# Patient Record
Sex: MALE | Race: OTHER | HISPANIC OR LATINO | URBAN - METROPOLITAN AREA
[De-identification: names, ages, dates, MRNs, and addresses within clinical notes are randomized per-mention and may not be internally consistent; named-entity substitution may affect disease eponyms.]

---

## 2017-06-06 ENCOUNTER — INPATIENT (INPATIENT)
Facility: HOSPITAL | Age: 52
LOS: 29 days | Discharge: HOME | DRG: 885 | End: 2017-07-06
Attending: PSYCHIATRY & NEUROLOGY | Admitting: PSYCHIATRY & NEUROLOGY
Payer: MEDICAID

## 2017-06-06 VITALS
OXYGEN SATURATION: 100 % | RESPIRATION RATE: 16 BRPM | TEMPERATURE: 98 F | SYSTOLIC BLOOD PRESSURE: 149 MMHG | HEART RATE: 88 BPM | DIASTOLIC BLOOD PRESSURE: 94 MMHG

## 2017-06-06 DIAGNOSIS — F25.9 SCHIZOAFFECTIVE DISORDER, UNSPECIFIED: ICD-10-CM

## 2017-06-06 DIAGNOSIS — F12.20 CANNABIS DEPENDENCE, UNCOMPLICATED: ICD-10-CM

## 2017-06-06 LAB
ALBUMIN SERPL ELPH-MCNC: 3.8 G/DL — SIGNIFICANT CHANGE UP (ref 3.3–5)
ALP SERPL-CCNC: 96 U/L — SIGNIFICANT CHANGE UP (ref 40–120)
ALT FLD-CCNC: 8 U/L — LOW (ref 10–45)
ANION GAP SERPL CALC-SCNC: 12 MMOL/L — SIGNIFICANT CHANGE UP (ref 5–17)
APAP SERPL-MCNC: <15 UG/ML — SIGNIFICANT CHANGE UP (ref 10–30)
APPEARANCE UR: CLEAR — SIGNIFICANT CHANGE UP
AST SERPL-CCNC: 13 U/L — SIGNIFICANT CHANGE UP (ref 10–40)
BASOPHILS NFR BLD AUTO: 0.3 % — SIGNIFICANT CHANGE UP (ref 0–2)
BILIRUB SERPL-MCNC: 0.3 MG/DL — SIGNIFICANT CHANGE UP (ref 0.2–1.2)
BILIRUB UR-MCNC: NEGATIVE — SIGNIFICANT CHANGE UP
BUN SERPL-MCNC: 9 MG/DL — SIGNIFICANT CHANGE UP (ref 7–23)
CALCIUM SERPL-MCNC: 9.1 MG/DL — SIGNIFICANT CHANGE UP (ref 8.4–10.5)
CHLORIDE SERPL-SCNC: 102 MMOL/L — SIGNIFICANT CHANGE UP (ref 96–108)
CO2 SERPL-SCNC: 23 MMOL/L — SIGNIFICANT CHANGE UP (ref 22–31)
COLOR SPEC: YELLOW — SIGNIFICANT CHANGE UP
CREAT SERPL-MCNC: 0.8 MG/DL — SIGNIFICANT CHANGE UP (ref 0.5–1.3)
DIFF PNL FLD: (no result)
EOSINOPHIL NFR BLD AUTO: 2.4 % — SIGNIFICANT CHANGE UP (ref 0–6)
ETHANOL SERPL-MCNC: <10 MG/DL — SIGNIFICANT CHANGE UP (ref 0–10)
GLUCOSE SERPL-MCNC: 170 MG/DL — HIGH (ref 70–99)
GLUCOSE UR QL: NEGATIVE — SIGNIFICANT CHANGE UP
HCT VFR BLD CALC: 36.6 % — LOW (ref 39–50)
HGB BLD-MCNC: 12.1 G/DL — LOW (ref 13–17)
KETONES UR-MCNC: NEGATIVE — SIGNIFICANT CHANGE UP
LEUKOCYTE ESTERASE UR-ACNC: NEGATIVE — SIGNIFICANT CHANGE UP
LYMPHOCYTES # BLD AUTO: 25.1 % — SIGNIFICANT CHANGE UP (ref 13–44)
MCHC RBC-ENTMCNC: 28.1 PG — SIGNIFICANT CHANGE UP (ref 27–34)
MCHC RBC-ENTMCNC: 33.1 G/DL — SIGNIFICANT CHANGE UP (ref 32–36)
MCV RBC AUTO: 84.9 FL — SIGNIFICANT CHANGE UP (ref 80–100)
MONOCYTES NFR BLD AUTO: 8.7 % — SIGNIFICANT CHANGE UP (ref 2–14)
NEUTROPHILS NFR BLD AUTO: 63.5 % — SIGNIFICANT CHANGE UP (ref 43–77)
NITRITE UR-MCNC: NEGATIVE — SIGNIFICANT CHANGE UP
PCP SPEC-MCNC: SIGNIFICANT CHANGE UP
PH UR: 7 — SIGNIFICANT CHANGE UP (ref 5–8)
PLATELET # BLD AUTO: 318 K/UL — SIGNIFICANT CHANGE UP (ref 150–400)
POTASSIUM SERPL-MCNC: 4 MMOL/L — SIGNIFICANT CHANGE UP (ref 3.5–5.3)
POTASSIUM SERPL-SCNC: 4 MMOL/L — SIGNIFICANT CHANGE UP (ref 3.5–5.3)
PROT SERPL-MCNC: 7 G/DL — SIGNIFICANT CHANGE UP (ref 6–8.3)
PROT UR-MCNC: NEGATIVE MG/DL — SIGNIFICANT CHANGE UP
RBC # BLD: 4.31 M/UL — SIGNIFICANT CHANGE UP (ref 4.2–5.8)
RBC # FLD: 14.4 % — SIGNIFICANT CHANGE UP (ref 10.3–16.9)
SALICYLATES SERPL-MCNC: <0.3 MG/DL — LOW (ref 2.8–20)
SODIUM SERPL-SCNC: 137 MMOL/L — SIGNIFICANT CHANGE UP (ref 135–145)
SP GR SPEC: 1.01 — SIGNIFICANT CHANGE UP (ref 1–1.03)
TSH SERPL-MCNC: 1.65 UIU/ML — SIGNIFICANT CHANGE UP (ref 0.35–4.94)
UROBILINOGEN FLD QL: 0.2 E.U./DL — SIGNIFICANT CHANGE UP
WBC # BLD: 7 K/UL — SIGNIFICANT CHANGE UP (ref 3.8–10.5)
WBC # FLD AUTO: 7 K/UL — SIGNIFICANT CHANGE UP (ref 3.8–10.5)

## 2017-06-06 PROCEDURE — 90792 PSYCH DIAG EVAL W/MED SRVCS: CPT

## 2017-06-06 PROCEDURE — 93010 ELECTROCARDIOGRAM REPORT: CPT

## 2017-06-06 PROCEDURE — 99285 EMERGENCY DEPT VISIT HI MDM: CPT | Mod: 25

## 2017-06-06 RX ORDER — NICOTINE POLACRILEX 2 MG
1 GUM BUCCAL DAILY
Qty: 0 | Refills: 0 | Status: DISCONTINUED | OUTPATIENT
Start: 2017-06-06 | End: 2017-06-07

## 2017-06-06 RX ORDER — GABAPENTIN 400 MG/1
300 CAPSULE ORAL THREE TIMES A DAY
Qty: 0 | Refills: 0 | Status: DISCONTINUED | OUTPATIENT
Start: 2017-06-06 | End: 2017-07-06

## 2017-06-06 RX ORDER — DULOXETINE HYDROCHLORIDE 30 MG/1
30 CAPSULE, DELAYED RELEASE ORAL DAILY
Qty: 0 | Refills: 0 | Status: DISCONTINUED | OUTPATIENT
Start: 2017-06-06 | End: 2017-06-14

## 2017-06-06 RX ORDER — DIVALPROEX SODIUM 500 MG/1
750 TABLET, DELAYED RELEASE ORAL
Qty: 0 | Refills: 0 | Status: DISCONTINUED | OUTPATIENT
Start: 2017-06-06 | End: 2017-07-06

## 2017-06-06 RX ORDER — BUPROPION HYDROCHLORIDE 150 MG/1
0 TABLET, EXTENDED RELEASE ORAL
Qty: 0 | Refills: 0 | COMMUNITY

## 2017-06-06 RX ORDER — OLANZAPINE 15 MG/1
20 TABLET, FILM COATED ORAL AT BEDTIME
Qty: 0 | Refills: 0 | Status: DISCONTINUED | OUTPATIENT
Start: 2017-06-06 | End: 2017-06-14

## 2017-06-06 NOTE — ED BEHAVIORAL HEALTH ASSESSMENT NOTE - SUICIDE RISK FACTORS
Mood episode/Hopelessness/Command hallucinations to hurt self/Substance abuse/dependence/Access to means (pills, firearms, etc.)

## 2017-06-06 NOTE — ED BEHAVIORAL HEALTH ASSESSMENT NOTE - OTHER PAST PSYCHIATRIC HISTORY (INCLUDE DETAILS REGARDING ONSET, COURSE OF ILLNESS, INPATIENT/OUTPATIENT TREATMENT)
Prior diagnosis of schizoaffective versus bipolar vs major depression. Several hospitalizations per patient since 2001, last was [facility] a month ago (verified with ), has been hospitalized prior at [facility]. No outpatient providers. Prior history of suicide attempt via overdose four years ago on zyprexa. No violence.

## 2017-06-06 NOTE — ED PROVIDER NOTE - MEDICAL DECISION MAKING DETAILS
51M with hx of bipolar d/o, depression presenting with suicidal ideation. No medical complaints, vital signs wnl, neuro exam nonfocal. Alert and oriented x 2 (knows president but not exact date). oriented to person, place, pt is medically cleared. Will admit to psych.

## 2017-06-06 NOTE — ED PROVIDER NOTE - OBJECTIVE STATEMENT
51M with bipolar d/o, schizophrenia presenting with suicidal ideation, told triage nurse he wants to jump in front of a bus. No other plans.

## 2017-06-06 NOTE — ED ADULT NURSE NOTE - OBJECTIVE STATEMENT
presented to ED "Feeling suicidal since yesterday; Anniversary of my Mother's death. Plan to run in front of a train".

## 2017-06-06 NOTE — ED BEHAVIORAL HEALTH ASSESSMENT NOTE - PSYCHIATRIC ISSUES AND PLAN (INCLUDE STANDING AND PRN MEDICATION)
Restart zyprexa 20mg po qhs, gabapentin 300mg po TID, depakote 750mg po BID, and cymbalta 30mg po daily

## 2017-06-06 NOTE — ED BEHAVIORAL HEALTH ASSESSMENT NOTE - SUMMARY
51 year old man domiciled in shelter, history of schizoaffective disorder, multiple hospitalizations last one month prior at [facility], one prior suicide attempt via overdose, here with command auditory hallucinations to kill himself by jumping in front of a bus in context of ongoing cannabis use and non-compliance with medication. Continues to feel suicidal with plan and possible intent.

## 2017-06-06 NOTE — ED BEHAVIORAL HEALTH ASSESSMENT NOTE - HPI (INCLUDE ILLNESS QUALITY, SEVERITY, DURATION, TIMING, CONTEXT, MODIFYING FACTORS, ASSOCIATED SIGNS AND SYMPTOMS)
51 year old single male on SSI domiciled at shelter on [facility] with prior psychiatric history of schizoaffective disorder, multiple hospitalizations last at [facility] one month prior for similar presentation, one suicide attempt four years prior via overdose, no history of violence, not in outpatient treatment, daily cannabis use, no dependents, no legal history or history of abuse, who brought himself in complaining of worsening depression and command auditory hallucinations to jump in front of bus in context of non-compliance with medication.    Patient with poverty of thought, somewhat concrete. Reports has been feeling suicidal a few weeks. Came in today because was feeling really depressed. Reports it is anniversary of his mother's death. Still feels suicidal to jump in front of bus or train. Voices of women telling him to leave hospital and go kill himself. Feeling depressed of late, low energy, poor appetite, poor sleep, hopelessness, helplessness and worthlessness. Does not want to actually die so brought himself to hospital.     Called [facility] for information about last admission. Per covering physician, last admitted March 25-May 5 for suicidal ideation of two weeks to jump in front of train because of being homeless in context of non-compliance with medication. He has been admitted there four times. Has been in outpatient treatment there in past from [id number]. Was not able to access discharge summary due to inpatient psychiatric record being on paper. Medications confirmed.     No other sources of collateral were available.

## 2017-06-06 NOTE — ED ADULT NURSE REASSESSMENT NOTE - NS ED NURSE REASSESS COMMENT FT1
pt alert/oriented and calm/cooperative. Pt states "I'm suicidal!"  He remains on constat observation

## 2017-06-06 NOTE — ED PROVIDER NOTE - CONSTITUTIONAL, MLM
normal... Well appearing, well nourished, awake, alert, oriented x 3 (person, place, president) in no apparent distress.

## 2017-06-06 NOTE — ED BEHAVIORAL HEALTH ASSESSMENT NOTE - DETAILS
history of suicide attempt four years ago by overdose on zyprexa GI to leave and kill himself 8u nursing self

## 2017-06-06 NOTE — ED BEHAVIORAL HEALTH ASSESSMENT NOTE - DIFFERENTIAL
Schizoaffective disorder  Bipolar disorder  Major depressive disorder  Cannabis-induced mood disorder

## 2017-06-06 NOTE — ED ADULT TRIAGE NOTE - ARRIVAL INFO ADDITIONAL COMMENTS
Last attempt 2 years ago. Reports hearing voices. Denies any CP, SOB, use of alcohol or drugs, wanting to hurt others.

## 2017-06-07 DIAGNOSIS — Z00.00 ENCOUNTER FOR GENERAL ADULT MEDICAL EXAMINATION WITHOUT ABNORMAL FINDINGS: ICD-10-CM

## 2017-06-07 DIAGNOSIS — R41.89 OTHER SYMPTOMS AND SIGNS INVOLVING COGNITIVE FUNCTIONS AND AWARENESS: ICD-10-CM

## 2017-06-07 DIAGNOSIS — F17.200 NICOTINE DEPENDENCE, UNSPECIFIED, UNCOMPLICATED: ICD-10-CM

## 2017-06-07 PROCEDURE — 99223 1ST HOSP IP/OBS HIGH 75: CPT

## 2017-06-07 RX ORDER — NICOTINE POLACRILEX 2 MG
1 GUM BUCCAL DAILY
Qty: 0 | Refills: 0 | Status: DISCONTINUED | OUTPATIENT
Start: 2017-06-07 | End: 2017-06-07

## 2017-06-07 RX ORDER — DIPHENHYDRAMINE HCL 50 MG
50 CAPSULE ORAL AT BEDTIME
Qty: 0 | Refills: 0 | Status: DISCONTINUED | OUTPATIENT
Start: 2017-06-07 | End: 2017-07-06

## 2017-06-07 RX ORDER — NICOTINE POLACRILEX 2 MG
1 GUM BUCCAL DAILY
Qty: 0 | Refills: 0 | Status: DISCONTINUED | OUTPATIENT
Start: 2017-06-08 | End: 2017-07-06

## 2017-06-07 RX ADMIN — DULOXETINE HYDROCHLORIDE 30 MILLIGRAM(S): 30 CAPSULE, DELAYED RELEASE ORAL at 10:47

## 2017-06-07 RX ADMIN — OLANZAPINE 20 MILLIGRAM(S): 15 TABLET, FILM COATED ORAL at 22:46

## 2017-06-07 RX ADMIN — GABAPENTIN 300 MILLIGRAM(S): 400 CAPSULE ORAL at 10:47

## 2017-06-07 RX ADMIN — DIVALPROEX SODIUM 750 MILLIGRAM(S): 500 TABLET, DELAYED RELEASE ORAL at 18:40

## 2017-06-07 RX ADMIN — GABAPENTIN 300 MILLIGRAM(S): 400 CAPSULE ORAL at 15:42

## 2017-06-07 RX ADMIN — Medication 50 MILLIGRAM(S): at 22:46

## 2017-06-07 RX ADMIN — Medication 1 PATCH: at 10:47

## 2017-06-07 RX ADMIN — DIVALPROEX SODIUM 750 MILLIGRAM(S): 500 TABLET, DELAYED RELEASE ORAL at 10:47

## 2017-06-07 RX ADMIN — GABAPENTIN 300 MILLIGRAM(S): 400 CAPSULE ORAL at 22:46

## 2017-06-07 NOTE — BEHAVIORAL HEALTH ASSESSMENT NOTE - PROBLEM SELECTOR PLAN 3
Will offer Nicotine patch 14mg/24 hrs for equivalent 10cigarettes/day.   patient on benefits of smoking cessation.

## 2017-06-07 NOTE — BEHAVIORAL HEALTH ASSESSMENT NOTE - RISK ASSESSMENT
Patient represents a clear risk to self and requires inpatient level of care.  	Risk factors: history of multiple psychiatric hospitalizations, male, substance      abuse, unemployed, domicile in shelter, prior suicide attempt   	Protective factors: seeking treatment, prior outpatient therapy

## 2017-06-07 NOTE — BEHAVIORAL HEALTH ASSESSMENT NOTE - NSBHSUICRISKFACTOR_PSY_A_CORE
Chronic pain or acute medical issue/Global insomnia/Hopelessness/Mood episode/Agitation/severe anxiety/Access to means (pills, firearms, etc.)/Command hallucinations to hurt self/Impulsivity

## 2017-06-07 NOTE — BEHAVIORAL HEALTH ASSESSMENT NOTE - OTHER PAST PSYCHIATRIC HISTORY (INCLUDE DETAILS REGARDING ONSET, COURSE OF ILLNESS, INPATIENT/OUTPATIENT TREATMENT)
Schizoaffective d/o   Prior hospitalizations: multiple prior psychiatric hospitalizations at [facility] secondary to SI and depression.   History of prior suicide attempts: patient reports 1 prior suicide attempt by drug overdose of Zyprexa approximately two years ago.   History of self-injurious behaviors: denies  Past medication trials: Zyprexa, Cymbalta, Depakote

## 2017-06-07 NOTE — BEHAVIORAL HEALTH ASSESSMENT NOTE - HPI (INCLUDE ILLNESS QUALITY, SEVERITY, DURATION, TIMING, CONTEXT, MODIFYING FACTORS, ASSOCIATED SIGNS AND SYMPTOMS)
52 y/o single, unemployed,  male domiciled at [facility] with a PHH of multiple psychiatric hospitalizations and schizoaffective disorder BIB self for depressed mood and SI stating “I don’t feel like living.” Endorses that he has been depressed for the past couple of years however is unable to verbalize what prompted him to come into the ED yesterday. Notes there was no inciting factor that triggered the depression. Patient reports one suicide attempt a couple years prior by overdosing on zyprexa. States he wants to execute suicide again. Admits he currently is experiencing auditory hallucinations in which,  “multiple big tall females of unrecognizable voices tell me to go jump in front of a train.” Reports, “they are here in the room right now” however unclear if there are associated visual hallucinations.  Endorses that he was hospitalized at [facility] from 3/25/17-5/5/17 secondary to depression and was discharged with Depakote, Zyprexa, and Cymbalta however unclear of dosage. Notes he has not been receiving medical care recently and has not taken his medications for the past couple of weeks. States he was regularly seeing a therapist outpatient at [facility] approximately 1 year prior and endorses that was helpful and believes it would be beneficial to restart. Denies HI.

## 2017-06-07 NOTE — BEHAVIORAL HEALTH ASSESSMENT NOTE - CASE SUMMARY
50 y/o single, unemployed,  male domiciled at [facility] with a PHH of multiple psychiatric hospitalizations and schizoaffective disorder BIB self for depressed mood and SI stating “I don’t feel like living.” Endorses that he has been depressed for the past couple of years however is unable to verbalize what prompted him to come into the ED yesterday. Notes there was no inciting factor that triggered the depression. Patient reports one suicide attempt a couple years prior by overdosing on zyprexa. States he wants to execute suicide again. Admits he currently is experiencing auditory hallucinations in which,  “multiple big tall females of unrecognizable voices tell me to go jump in front of a train.” Reports, “they are here in the room right now” however unclear if there are associated visual hallucinations.  Endorses that he was hospitalized at [facility] from 3/25/17-5/5/17 secondary to depression and was discharged with Depakote, Zyprexa, and Cymbalta however unclear of dosage. Notes he has not been receiving medical care recently and has not taken his medications for the past couple of weeks. States he was regularly seeing a therapist outpatient at [facility] approximately 1 year prior and endorses that was helpful and believes it would be beneficial to restart. Denies HI.  Patient found to be oriented but poor concentration; internally preoccupied denying AH or HI or SI.

## 2017-06-07 NOTE — BEHAVIORAL HEALTH ASSESSMENT NOTE - PROBLEM SELECTOR PLAN 1
a.	Continue Zyprexa 20mg qhs  b.	Continue Depakote 750mg BID. Will obtain a VPA level in AM.  c.	Continue Cymbalta 30mg qd for depression with adjunctive neuropathic pain control.  d.	I/G/M therapy.

## 2017-06-07 NOTE — BEHAVIORAL HEALTH ASSESSMENT NOTE - MODIFICATIONS
monitor cognitive functioning; consider alternative to Zyprexa in view of possible/likely non-complaince

## 2017-06-07 NOTE — BEHAVIORAL HEALTH ASSESSMENT NOTE - PROBLEM SELECTOR PLAN 4
EDUCATION: Continue to educate patient on risks/benefits of each psychotropic medication and psychotherapeutic modality  PROPHYLAXIS: Will check Folate, Vitamin B12, Vitamin D, Lipid Panel, HbA1c, assess cognition daily.  DISPOSITION: Discharge planning in progress.

## 2017-06-07 NOTE — BEHAVIORAL HEALTH ASSESSMENT NOTE - DETAILS
Lithium - nausea/vomiting Nerve pain in bilateral lower extremities Female "clones" telling him to jump in front of a train

## 2017-06-08 PROCEDURE — 99232 SBSQ HOSP IP/OBS MODERATE 35: CPT

## 2017-06-08 RX ADMIN — Medication 50 MILLIGRAM(S): at 21:35

## 2017-06-08 RX ADMIN — GABAPENTIN 300 MILLIGRAM(S): 400 CAPSULE ORAL at 08:01

## 2017-06-08 RX ADMIN — DIVALPROEX SODIUM 750 MILLIGRAM(S): 500 TABLET, DELAYED RELEASE ORAL at 21:35

## 2017-06-08 RX ADMIN — Medication 1 PATCH: at 14:15

## 2017-06-08 RX ADMIN — OLANZAPINE 20 MILLIGRAM(S): 15 TABLET, FILM COATED ORAL at 21:35

## 2017-06-08 RX ADMIN — DULOXETINE HYDROCHLORIDE 30 MILLIGRAM(S): 30 CAPSULE, DELAYED RELEASE ORAL at 14:15

## 2017-06-08 RX ADMIN — DIVALPROEX SODIUM 750 MILLIGRAM(S): 500 TABLET, DELAYED RELEASE ORAL at 08:01

## 2017-06-08 RX ADMIN — GABAPENTIN 300 MILLIGRAM(S): 400 CAPSULE ORAL at 21:35

## 2017-06-08 NOTE — PROGRESS NOTE BEHAVIORAL HEALTH - CASE SUMMARY
52 y/o single, unemployed,  male domiciled at [facility] with a PPH of multiple psychiatric hospitalizations and schizoaffective disorder BIB self for depressed mood and SI stating “I don’t feel like living.” Patient with command auditory hallucinations and questionable visual hallucinations. He appears depressed and endorses suicidal ideation with a plan.  Patient continues psychotic and somewhat oppositional .

## 2017-06-08 NOTE — PROGRESS NOTE BEHAVIORAL HEALTH - NSBHFUPINTERVALHXFT_PSY_A_CORE
Per nursing note, patient was observed out watching TV during pm. Patient was compliant with his meds and received Benadryl 50mg po PRN at 10:46pm. Patient observed asleep during the night.      Patient was initially approached lying comfortably on bed in hospital attire at approx. 8am however was unarousable. Attempted to awake the patient 2 hours thereafter and patient was cooperative, calm however lethargic with poor eye contact. Endorses he does not feel any better since yesterday and that his AH are persistent insisting, “leave and go jump in front of a train.” Reports although feeling hopeless, he desires to get better. Admits he has not attended any therapy sessions thus far and has no intention to do so. Plans to spend the day watching TV much like yesterday. Notes he slept well through the night and has no change in appetite/eating habits. Patient denies N/V/D, Constipation or any other physical symptoms. Patient continues to deny HI, VH and does not exhibit grossly delusional or paranoid behavior.  Mini Mental Status Examination performed. Patient scores a 28/30.

## 2017-06-09 LAB
24R-OH-CALCIDIOL SERPL-MCNC: 14.6 NG/ML — LOW (ref 30–100)
CHOLEST SERPL-MCNC: 208 MG/DL — HIGH (ref 10–199)
FOLATE SERPL-MCNC: 14.7 NG/ML — SIGNIFICANT CHANGE UP (ref 4.8–24.2)
HBA1C BLD-MCNC: 5.3 % — SIGNIFICANT CHANGE UP (ref 4–5.6)
HDLC SERPL-MCNC: 45 MG/DL — SIGNIFICANT CHANGE UP (ref 40–125)
LIPID PNL WITH DIRECT LDL SERPL: 148 MG/DL — HIGH
TOTAL CHOLESTEROL/HDL RATIO MEASUREMENT: 4.6 RATIO — SIGNIFICANT CHANGE UP (ref 3.4–9.6)
TRIGL SERPL-MCNC: 75 MG/DL — SIGNIFICANT CHANGE UP (ref 10–149)
VALPROATE SERPL-MCNC: 67 UG/ML — SIGNIFICANT CHANGE UP (ref 50–100)
VIT B12 SERPL-MCNC: 266 PG/ML — SIGNIFICANT CHANGE UP (ref 243–894)

## 2017-06-09 PROCEDURE — 99232 SBSQ HOSP IP/OBS MODERATE 35: CPT

## 2017-06-09 RX ADMIN — GABAPENTIN 300 MILLIGRAM(S): 400 CAPSULE ORAL at 13:35

## 2017-06-09 RX ADMIN — DIVALPROEX SODIUM 750 MILLIGRAM(S): 500 TABLET, DELAYED RELEASE ORAL at 06:42

## 2017-06-09 RX ADMIN — GABAPENTIN 300 MILLIGRAM(S): 400 CAPSULE ORAL at 22:56

## 2017-06-09 RX ADMIN — Medication 1 PATCH: at 13:35

## 2017-06-09 RX ADMIN — GABAPENTIN 300 MILLIGRAM(S): 400 CAPSULE ORAL at 06:42

## 2017-06-09 RX ADMIN — DULOXETINE HYDROCHLORIDE 30 MILLIGRAM(S): 30 CAPSULE, DELAYED RELEASE ORAL at 13:35

## 2017-06-09 RX ADMIN — OLANZAPINE 20 MILLIGRAM(S): 15 TABLET, FILM COATED ORAL at 22:56

## 2017-06-09 RX ADMIN — Medication 1 PATCH: at 14:50

## 2017-06-09 RX ADMIN — DIVALPROEX SODIUM 750 MILLIGRAM(S): 500 TABLET, DELAYED RELEASE ORAL at 17:57

## 2017-06-09 NOTE — PROGRESS NOTE BEHAVIORAL HEALTH - CASE SUMMARY
50 y/o single, unemployed,  male domiciled at [facility] with a PPH of multiple psychiatric hospitalizations and schizoaffective disorder BIB self for depressed mood and SI stating “I don’t feel like living.” Patient with command auditory hallucinations and questionable visual hallucinations. He appears depressed and endorses suicidal ideation with a plan however does not intend to execute during inpatient stay. Probable schizoaffective d/o vs. MDD in conjunction with AH and questionable VH.   To the writer appears psychotic with blocking inappropriate affect; very poor ADLs; avoiding interview; smiles strangely at the writer.  Continue efforts to treat psychotic sxs.

## 2017-06-09 NOTE — PROGRESS NOTE BEHAVIORAL HEALTH - MODIFICATIONS
agree with plan to switch to agent with practical ZAMORA e.g. Invega given significant psychotic features and likelihood of poor compliance.

## 2017-06-09 NOTE — PROGRESS NOTE BEHAVIORAL HEALTH - PROBLEM SELECTOR PLAN 1
a.	Continue Zyprexa 20mg qhs - will consider switch to Paliperidone secondary to sedative effects of Olanzapine. Will obtain EKG secondary to high dose Olanzapine and concurrent nicotine use  b.	Continue Depakote 750mg BID. VPA = 67 on 6/9/17  c.	Continue Cymbalta 30mg qd for depression with adjunctive neuropathic pain control.  d.	I/G/M therapy.

## 2017-06-09 NOTE — PROGRESS NOTE BEHAVIORAL HEALTH - NSBHFUPINTERVALHXFT_PSY_A_CORE
Per nursing note, patient is observed sleeping during the night without episode of agitation or acute distress whilst maintained on q15 minute observation and q2 hour purposeful proactive rounding. In the evening he was visible on the units out for snacks and medication. Compliant with meds. Mood was depressed with concrete thought process and blunted affect. Reports auditory hallucinations and ideations to jump in front of a train but states he would not attempt to hurt himself while in hospital. At 21:35 he reported insomnia and received Benadryl 50mg PRN with good affect. Patient with poor insight. In behavioral control. Continuing to monitor.     Patient was seen individually this morning lying comfortably on bed in hospital attire and remained selectively mute, guarded, lethargic, with poor eye contact. Endorses he feels “good” despite continuous auditory hallucinations from the “clones” which are now “cursing at me because they don’t like me.” Endorses that the “clones” are continuously urging him to “jump in front of a train” however reports, “I’m fighting it.” Suicidal ideations are persistent however verbalizes he does not intend to execute plan while inpatient. Admits he spent the day playing cards and watching TV and anticipates continuing with these activities today. Notes he slept well through the night despite need for Benadryl and has no change in appetite/eating habits. Patient denies N/V/D, constipation or any other physical symptoms. Compliant with meds and denies any adverse reactions. Patient continues to deny HI, VH and does not exhibit grossly delusional or paranoid behavior.

## 2017-06-10 RX ADMIN — DULOXETINE HYDROCHLORIDE 30 MILLIGRAM(S): 30 CAPSULE, DELAYED RELEASE ORAL at 11:05

## 2017-06-10 RX ADMIN — GABAPENTIN 300 MILLIGRAM(S): 400 CAPSULE ORAL at 11:05

## 2017-06-10 RX ADMIN — DIVALPROEX SODIUM 750 MILLIGRAM(S): 500 TABLET, DELAYED RELEASE ORAL at 11:05

## 2017-06-10 RX ADMIN — GABAPENTIN 300 MILLIGRAM(S): 400 CAPSULE ORAL at 14:05

## 2017-06-10 RX ADMIN — Medication 1 PATCH: at 13:58

## 2017-06-10 RX ADMIN — DIVALPROEX SODIUM 750 MILLIGRAM(S): 500 TABLET, DELAYED RELEASE ORAL at 18:16

## 2017-06-10 RX ADMIN — Medication 1 PATCH: at 14:04

## 2017-06-11 RX ADMIN — GABAPENTIN 300 MILLIGRAM(S): 400 CAPSULE ORAL at 22:04

## 2017-06-11 RX ADMIN — Medication 1 PATCH: at 10:45

## 2017-06-11 RX ADMIN — GABAPENTIN 300 MILLIGRAM(S): 400 CAPSULE ORAL at 14:09

## 2017-06-11 RX ADMIN — OLANZAPINE 20 MILLIGRAM(S): 15 TABLET, FILM COATED ORAL at 22:05

## 2017-06-11 RX ADMIN — DIVALPROEX SODIUM 750 MILLIGRAM(S): 500 TABLET, DELAYED RELEASE ORAL at 06:59

## 2017-06-11 RX ADMIN — Medication 50 MILLIGRAM(S): at 22:05

## 2017-06-11 RX ADMIN — DIVALPROEX SODIUM 750 MILLIGRAM(S): 500 TABLET, DELAYED RELEASE ORAL at 17:53

## 2017-06-11 RX ADMIN — DULOXETINE HYDROCHLORIDE 30 MILLIGRAM(S): 30 CAPSULE, DELAYED RELEASE ORAL at 10:44

## 2017-06-11 RX ADMIN — GABAPENTIN 300 MILLIGRAM(S): 400 CAPSULE ORAL at 06:59

## 2017-06-12 PROCEDURE — 99232 SBSQ HOSP IP/OBS MODERATE 35: CPT

## 2017-06-12 RX ORDER — CHOLECALCIFEROL (VITAMIN D3) 125 MCG
1000 CAPSULE ORAL DAILY
Qty: 0 | Refills: 0 | Status: DISCONTINUED | OUTPATIENT
Start: 2017-06-12 | End: 2017-07-06

## 2017-06-12 RX ADMIN — DIVALPROEX SODIUM 750 MILLIGRAM(S): 500 TABLET, DELAYED RELEASE ORAL at 11:51

## 2017-06-12 RX ADMIN — GABAPENTIN 300 MILLIGRAM(S): 400 CAPSULE ORAL at 11:52

## 2017-06-12 RX ADMIN — Medication 1 PATCH: at 14:00

## 2017-06-12 RX ADMIN — GABAPENTIN 300 MILLIGRAM(S): 400 CAPSULE ORAL at 14:00

## 2017-06-12 RX ADMIN — DIVALPROEX SODIUM 750 MILLIGRAM(S): 500 TABLET, DELAYED RELEASE ORAL at 17:41

## 2017-06-12 RX ADMIN — Medication 1000 UNIT(S): at 11:52

## 2017-06-12 RX ADMIN — DULOXETINE HYDROCHLORIDE 30 MILLIGRAM(S): 30 CAPSULE, DELAYED RELEASE ORAL at 11:53

## 2017-06-12 RX ADMIN — Medication 1 PATCH: at 11:38

## 2017-06-12 NOTE — PROGRESS NOTE BEHAVIORAL HEALTH - NSBHFUPINTERVALHXFT_PSY_A_CORE
Per nursing note, patient visible on unit, social with select peers. Compliant with meds. Complained of difficulty sleeping and was given Benadryl 50mg PRN at 2205. Slept most of the night. Blunted affect. Continue to monitor and observe.     Patient was seen individually this morning lying comfortably on bed in hospital attire and remained selectively mute, guarded, lethargic, with poor eye contact. Endorses he is feeling “a little better” and notes he spent the majority of the weekend watching television. Admits he did not attend any group therapy sessions the last couple of days however does intend to today. Reports that the “clones” are continuously urging him to “jump in front of a train and tell me to die.” Endorses his suicidal ideations are persistent however verbalizes he does not intend to execute plan while inpatient. Patient denies N/V/D, constipation or any other physical symptoms. Per patient, no changes in sleeping or eating habits. Compliant with meds and denies any adverse reactions. Patient continues to deny HI, VH and does not exhibit grossly delusional or paranoid behavior.    CURRENT MEDICATIONS:  MEDICATIONS  (STANDING):  OLANZapine 20milliGRAM(s) Oral at bedtime  diVALproex DR 750milliGRAM(s) Oral two times a day  DULoxetine 30milliGRAM(s) Oral daily  gabapentin 300milliGRAM(s) Oral three times a day  nicotine -  14 mG/24Hr(s) Patch 1patch Transdermal daily  cholecalciferol 1000Unit(s) Oral daily    MEDICATIONS  (PRN):  diphenhydrAMINE   Capsule 50milliGRAM(s) Oral at bedtime PRN Insomnia

## 2017-06-12 NOTE — PROGRESS NOTE BEHAVIORAL HEALTH - PROBLEM SELECTOR PLAN 1
a.	Continue Zyprexa 20mg qhs - will consider switch to Paliperidone secondary to sedative effects of Olanzapine.   b.           EKG pending  b.	Continue Depakote 750mg BID. VPA = 67 on 6/9/17  c.	Continue Cymbalta 30mg qd for depression with adjunctive neuropathic pain control.  d.	I/G/M therapy.

## 2017-06-12 NOTE — PROGRESS NOTE BEHAVIORAL HEALTH - CASE SUMMARY
52 y/o single, unemployed,  male domiciled at [facility] with a PPH of multiple psychiatric hospitalizations and schizoaffective disorder BIB self for depressed mood and SI stating “I don’t feel like living.” Patient with command auditory hallucinations and questionable visual hallucinations. He appears depressed and endorses suicidal ideation with a plan however does not intend to execute during inpatient stay. Probable schizoaffective d/o vs. MDD in conjunction with AH and questionable VH. Patient is difficult to engage and his affect is blunted. Needs encouragement to take medications. Remains isolative and guarded requiring inpatient hospitalization at this time for the safety of the patient,  Patient has had limited interaction with the writer smiling quietly and saying little; also limited interaction with staff mostly quietly taking meals and saying little;  need to see more social behavior but concerns about aftercare compliance described above remain.  ADLs are poor.  Appears blocked and internally preoccupied.  Monitor EKG as Zyrexa dose increased.

## 2017-06-12 NOTE — PROGRESS NOTE BEHAVIORAL HEALTH - MODIFICATIONS
while the patient appears to be making slow progress concern about aftercare compliance on oral Zyprexa.  Need to consider agent with practical ZAMORA or case management .

## 2017-06-13 PROCEDURE — 99232 SBSQ HOSP IP/OBS MODERATE 35: CPT

## 2017-06-13 RX ORDER — PALIPERIDONE 1.5 MG/1
3 TABLET, EXTENDED RELEASE ORAL DAILY
Qty: 0 | Refills: 0 | Status: DISCONTINUED | OUTPATIENT
Start: 2017-06-13 | End: 2017-06-14

## 2017-06-13 RX ORDER — DOCUSATE SODIUM 100 MG
100 CAPSULE ORAL
Qty: 0 | Refills: 0 | Status: DISCONTINUED | OUTPATIENT
Start: 2017-06-13 | End: 2017-07-06

## 2017-06-13 RX ADMIN — GABAPENTIN 300 MILLIGRAM(S): 400 CAPSULE ORAL at 14:06

## 2017-06-13 RX ADMIN — GABAPENTIN 300 MILLIGRAM(S): 400 CAPSULE ORAL at 21:54

## 2017-06-13 RX ADMIN — Medication 100 MILLIGRAM(S): at 21:54

## 2017-06-13 RX ADMIN — Medication 1 PATCH: at 14:07

## 2017-06-13 RX ADMIN — Medication 50 MILLIGRAM(S): at 21:53

## 2017-06-13 RX ADMIN — DULOXETINE HYDROCHLORIDE 30 MILLIGRAM(S): 30 CAPSULE, DELAYED RELEASE ORAL at 10:42

## 2017-06-13 RX ADMIN — DIVALPROEX SODIUM 750 MILLIGRAM(S): 500 TABLET, DELAYED RELEASE ORAL at 10:43

## 2017-06-13 RX ADMIN — GABAPENTIN 300 MILLIGRAM(S): 400 CAPSULE ORAL at 10:42

## 2017-06-13 RX ADMIN — Medication 1000 UNIT(S): at 10:42

## 2017-06-13 RX ADMIN — DIVALPROEX SODIUM 750 MILLIGRAM(S): 500 TABLET, DELAYED RELEASE ORAL at 16:45

## 2017-06-13 RX ADMIN — OLANZAPINE 20 MILLIGRAM(S): 15 TABLET, FILM COATED ORAL at 21:54

## 2017-06-13 NOTE — PROGRESS NOTE BEHAVIORAL HEALTH - NSBHCHARTREVIEWLAB_PSY_A_CORE FT
Vitamin D25=14.6  Vitamin B12 = 266  Folate = 14.7
Vitamin D25=14.6  Vitamin B12 = 266  Folate = 14.7
VPA = 67  HbA1c = 5.3  Lipid Panel:   LDL = 148; HDL = 45; TG = 75; Total Chol: 208

## 2017-06-13 NOTE — PROGRESS NOTE BEHAVIORAL HEALTH - NSBHFUPINTERVALHXFT_PSY_A_CORE
continues rather avoidant; case reviewed with Dr. Malloy of [facility]; suggestion made regarding switch to agent with more practical ZAMORA in view of past h/o poor compliance;  discussed with the patient cross over to Invega/SUSTENNA from Zyprexa;  carmela helpful in view of sedation and constipation.  Patient agrees.  Other suggestions involved questioning use of Cymbalta low dose and possible switch to Lithium from Depakote.  To be considered.   No behavioral issues.

## 2017-06-13 NOTE — PROGRESS NOTE BEHAVIORAL HEALTH - NSBHADMITMEDEDUDETAILS_A_CORE FT
Plan to introduce Invega and monitor; if possible trim Zyprexa.  Will consider need to switch mood stabilizer;  likely Cymbalta can be d/c'd and replacement found for pain issues.

## 2017-06-13 NOTE — PROGRESS NOTE BEHAVIORAL HEALTH - DETAILS
Nerve pain in bilateral lower extremities details not shared with this writer. constipation in am; but had bowel movement after lunch.

## 2017-06-14 RX ORDER — PALIPERIDONE 1.5 MG/1
6 TABLET, EXTENDED RELEASE ORAL DAILY
Qty: 0 | Refills: 0 | Status: DISCONTINUED | OUTPATIENT
Start: 2017-06-15 | End: 2017-06-19

## 2017-06-14 RX ORDER — OLANZAPINE 15 MG/1
15 TABLET, FILM COATED ORAL AT BEDTIME
Qty: 0 | Refills: 0 | Status: DISCONTINUED | OUTPATIENT
Start: 2017-06-14 | End: 2017-06-19

## 2017-06-14 RX ORDER — PALIPERIDONE 1.5 MG/1
6 TABLET, EXTENDED RELEASE ORAL DAILY
Qty: 0 | Refills: 0 | Status: DISCONTINUED | OUTPATIENT
Start: 2017-06-14 | End: 2017-06-14

## 2017-06-14 RX ADMIN — Medication 100 MILLIGRAM(S): at 10:17

## 2017-06-14 RX ADMIN — Medication 100 MILLIGRAM(S): at 22:51

## 2017-06-14 RX ADMIN — PALIPERIDONE 3 MILLIGRAM(S): 1.5 TABLET, EXTENDED RELEASE ORAL at 10:16

## 2017-06-14 RX ADMIN — GABAPENTIN 300 MILLIGRAM(S): 400 CAPSULE ORAL at 22:52

## 2017-06-14 RX ADMIN — OLANZAPINE 15 MILLIGRAM(S): 15 TABLET, FILM COATED ORAL at 22:51

## 2017-06-14 RX ADMIN — DIVALPROEX SODIUM 750 MILLIGRAM(S): 500 TABLET, DELAYED RELEASE ORAL at 10:16

## 2017-06-14 RX ADMIN — Medication 1 PATCH: at 13:25

## 2017-06-14 RX ADMIN — GABAPENTIN 300 MILLIGRAM(S): 400 CAPSULE ORAL at 10:16

## 2017-06-14 RX ADMIN — GABAPENTIN 300 MILLIGRAM(S): 400 CAPSULE ORAL at 13:10

## 2017-06-14 RX ADMIN — Medication 1 PATCH: at 13:11

## 2017-06-14 RX ADMIN — DULOXETINE HYDROCHLORIDE 30 MILLIGRAM(S): 30 CAPSULE, DELAYED RELEASE ORAL at 13:11

## 2017-06-14 RX ADMIN — Medication 1000 UNIT(S): at 10:16

## 2017-06-14 NOTE — PROGRESS NOTE BEHAVIORAL HEALTH - NSBHFUPINTERVALHXFT_PSY_A_CORE
Per nursing note, patient is observed sleeping through the night without episode of acute distress nor agitation and continues on q15 minute observation and q2 purposeful rounding. In the evening he was visible on the unit, attired in hospital gowns, of neat appearance; isolating from other community members. At HS medication pass he was compliant. Patient reported insomnia and received Benadryl 50mg po PRN at 21:53. His mood was brighter as evidenced by smiling; affect was more reactive. He denies having suicidal ideations whilst in the hospital; states that he still has ideations to jump in front of a train if discharged. Thoughts are concrete with limited insight. He is maintaining good behavioral control. Continuing to monitor and implement plan of care.     Patient was seen individually this morning lying comfortably on bed, blanket over face, reporting “go away.” Was uncooperative, had poor eye contact, and had to be prompted repeatedly in order to elicit a response. Endorses he is feeling “depressed” because “he doesn’t feel well.” Notes, “I don’t know what is going to make me feel better.” However when asked to clarify, he responds “nothing is bothering me.” States he attended one group therapy on movement yesterday that he noted to be “alright” and has intent to participate in another session today. Reports that the “clones” are still urging him to “leave and jump in front of a train.” Endorses his suicidal ideations are persistent however insists he does not intend to execute plan while inpatient. Per patient, constipation resolved. Admits one soft, formed bowel movement yesterday s/p Colace. Patient denies N/V/D, bloating, or abdominal pain. Notes he has been taking Benadryl secondary to difficulty falling asleep with moderate relief. No changes in appetite. Per patient, compliant with meds and denies any adverse reactions. Patient continues to deny HI, VH and does not exhibit grossly delusional or paranoid behavior.    CURRENT MEDS:  MEDICATIONS  (STANDING):  OLANZapine 20milliGRAM(s) Oral at bedtime  diVALproex DR 750milliGRAM(s) Oral two times a day  DULoxetine 30milliGRAM(s) Oral daily  gabapentin 300milliGRAM(s) Oral three times a day  nicotine -  14 mG/24Hr(s) Patch 1patch Transdermal daily  cholecalciferol 1000Unit(s) Oral daily  paliperidone ER 3milliGRAM(s) Oral daily  docusate sodium 100milliGRAM(s) Oral two times a day    MEDICATIONS  (PRN):  diphenhydrAMINE   Capsule 50milliGRAM(s) Oral at bedtime PRN Insomnia

## 2017-06-14 NOTE — PROGRESS NOTE BEHAVIORAL HEALTH - OTHER
Patient often declines to answer; head under the sheets Improved as compared to yesterday Improving Rigidity in lower limbs - chronic

## 2017-06-14 NOTE — PROGRESS NOTE BEHAVIORAL HEALTH - PROBLEM SELECTOR PLAN 1
a.	Decrease Zyprexa to 15 mg qhs - increase Paliperidone to 3mg PO BID.   b.           EKG pending  b.	Continue Depakote 750mg BID. VPA = 67 on 6/9/17  c.	Discontinue Cymbalta   d.	I/G/M therapy.

## 2017-06-14 NOTE — PROGRESS NOTE BEHAVIORAL HEALTH - NSBHADMITMEDEDUDETAILS_A_CORE FT
Plan to introduce Invega and monitor; if possible trim Zyprexa.  Will consider need to switch mood stabilizer;   Cymbalta can be d/c'd and replacement found for pain issues.

## 2017-06-15 RX ADMIN — Medication 100 MILLIGRAM(S): at 09:45

## 2017-06-15 RX ADMIN — Medication 1 PATCH: at 09:43

## 2017-06-15 RX ADMIN — GABAPENTIN 300 MILLIGRAM(S): 400 CAPSULE ORAL at 14:14

## 2017-06-15 RX ADMIN — Medication 1000 UNIT(S): at 09:45

## 2017-06-15 RX ADMIN — PALIPERIDONE 6 MILLIGRAM(S): 1.5 TABLET, EXTENDED RELEASE ORAL at 09:45

## 2017-06-15 RX ADMIN — GABAPENTIN 300 MILLIGRAM(S): 400 CAPSULE ORAL at 09:45

## 2017-06-15 RX ADMIN — OLANZAPINE 15 MILLIGRAM(S): 15 TABLET, FILM COATED ORAL at 21:42

## 2017-06-15 RX ADMIN — Medication 1 PATCH: at 09:46

## 2017-06-15 RX ADMIN — Medication 100 MILLIGRAM(S): at 21:41

## 2017-06-15 RX ADMIN — GABAPENTIN 300 MILLIGRAM(S): 400 CAPSULE ORAL at 21:44

## 2017-06-15 RX ADMIN — DIVALPROEX SODIUM 750 MILLIGRAM(S): 500 TABLET, DELAYED RELEASE ORAL at 21:41

## 2017-06-15 RX ADMIN — DIVALPROEX SODIUM 750 MILLIGRAM(S): 500 TABLET, DELAYED RELEASE ORAL at 09:44

## 2017-06-15 RX ADMIN — Medication 50 MILLIGRAM(S): at 21:42

## 2017-06-15 NOTE — PROGRESS NOTE BEHAVIORAL HEALTH - PROBLEM SELECTOR PLAN 1
a.	Continue Zyprexa 15mg qhs and Paliperdone 6mg PO daily.   b.           EKG pending  c.	Continue Depakote 750mg BID. VPA = 67 on 6/9/17  d.	I/G/M therapy.

## 2017-06-15 NOTE — PROGRESS NOTE BEHAVIORAL HEALTH - NSBHFUPINTERVALHXFT_PSY_A_CORE
Per nursing note, patient was observed mostly in bed during pm. Out for his meds. Altered with no complaints. Patient was compliant with his meds and observed asleep during the night. Will monitor for changes. Proactive, purposeful rounding continued.      Patient was seen individually this morning lying comfortably on bed, in hospital attire, calm, pleasant, with fair eye contact. Endorses he is “feeling a little better” and endorses that the “clones were quiet today and yesterday.” States he attended several group therapy sessions yesterday, which he enjoyed and intends to participate in mores session today. Endorses he is still suicidal however feels safe on the unit and does not intend to execute plan while inpatient. Patient denies N/V/D, or constipation. Had one soft, formed bowel movement yesterday. No changes in sleep hygiene or appetite. Per patient, compliant with meds and denies any adverse reactions. Patient continues to deny HI, VH and does not exhibit grossly delusional or paranoid behavior.

## 2017-06-15 NOTE — PROGRESS NOTE BEHAVIORAL HEALTH - CASE SUMMARY
at time of admission:  50 y/o single, unemployed,  male domiciled at [facility] with a PPH of multiple psychiatric hospitalizations and schizoaffective disorder BIB self for depressed mood and SI stating “I don’t feel like living.” Patient with command auditory hallucinations and questionable visual hallucinations. He appears depressed and endorses suicidal ideation with a plan however does not intend to execute during inpatient stay. Probable schizoaffective d/o vs. MDD in conjunction with AH and questionable VH. Experiencing less auditory hallucinations from baseline today. Pleasant, calm, cooperative, with fair eye contact on encounter. Requires inpatient hospitalization at this time for the safety of the patient, stabilization of symptoms and for safe discharge planning.  Patient responding to switch to Invega from Zyprexa with better mood; less impoverishment of thinking; slightly less constriction; states that "drones" are disappearing;  SI diminishing;  observed attending some groups with limited participation.

## 2017-06-16 DIAGNOSIS — M79.2 NEURALGIA AND NEURITIS, UNSPECIFIED: ICD-10-CM

## 2017-06-16 PROCEDURE — 99231 SBSQ HOSP IP/OBS SF/LOW 25: CPT

## 2017-06-16 RX ORDER — ONDANSETRON 8 MG/1
4 TABLET, FILM COATED ORAL EVERY 6 HOURS
Qty: 0 | Refills: 0 | Status: DISCONTINUED | OUTPATIENT
Start: 2017-06-16 | End: 2017-07-06

## 2017-06-16 RX ORDER — ACETAMINOPHEN 500 MG
650 TABLET ORAL EVERY 6 HOURS
Qty: 0 | Refills: 0 | Status: DISCONTINUED | OUTPATIENT
Start: 2017-06-16 | End: 2017-06-28

## 2017-06-16 RX ADMIN — DIVALPROEX SODIUM 750 MILLIGRAM(S): 500 TABLET, DELAYED RELEASE ORAL at 06:53

## 2017-06-16 RX ADMIN — Medication 1 PATCH: at 09:47

## 2017-06-16 RX ADMIN — GABAPENTIN 300 MILLIGRAM(S): 400 CAPSULE ORAL at 14:05

## 2017-06-16 RX ADMIN — Medication 100 MILLIGRAM(S): at 09:45

## 2017-06-16 RX ADMIN — GABAPENTIN 300 MILLIGRAM(S): 400 CAPSULE ORAL at 21:14

## 2017-06-16 RX ADMIN — Medication 1000 UNIT(S): at 09:44

## 2017-06-16 RX ADMIN — Medication 50 MILLIGRAM(S): at 21:16

## 2017-06-16 RX ADMIN — DIVALPROEX SODIUM 750 MILLIGRAM(S): 500 TABLET, DELAYED RELEASE ORAL at 17:22

## 2017-06-16 RX ADMIN — OLANZAPINE 15 MILLIGRAM(S): 15 TABLET, FILM COATED ORAL at 21:14

## 2017-06-16 RX ADMIN — PALIPERIDONE 6 MILLIGRAM(S): 1.5 TABLET, EXTENDED RELEASE ORAL at 09:45

## 2017-06-16 RX ADMIN — GABAPENTIN 300 MILLIGRAM(S): 400 CAPSULE ORAL at 06:52

## 2017-06-16 RX ADMIN — Medication 1 PATCH: at 12:31

## 2017-06-16 RX ADMIN — Medication 100 MILLIGRAM(S): at 21:14

## 2017-06-16 NOTE — PROGRESS NOTE BEHAVIORAL HEALTH - NSBHFUPINTERVALHXFT_PSY_A_CORE
Per nursing note, patient was visible on the unit at short intervals. Minimally social with peers. Patient accepted scheduled medications. He also requested and received PRN Benadryl 50mg for sleep with good effect. Patient slept throughout the night without further complaints. Purposeful, proactive counseling in progress.      Patient was seen individually this morning lying comfortably on bed, in hospital attire, calm, pleasant, with fair eye contact. Endorses he is “half happy/half depressed.” When asked why he did not attend group therapy yesterday, patient states, “I don’t like what they talk about” however unable to elaborate. Endorses he will attempt to attend sessions today. Currently c/o 9/10, sharp, unrelenting, non-radiating 9/10 lower back pain onset yesterday as well as mild nausea which he attributes to his medications. Admits one episode of NBNB emesis s/p lunch yesterday. Patient denies diarrhea or constipation. Had one soft, formed bowel movement yesterday. Endorses he is still suicidal however feels safe on the unit and does not intend to execute plan while inpatient. Admits the “clones are back today saying stupid shit like I’m an asshole and to jump in front of a train.” No changes in sleep hygiene or appetite. Per patient, compliant with meds. Patient continues to deny HI, VH and does not exhibit grossly delusional or paranoid behavior.    CURRENT MEDICATIONS:  MEDICATIONS  (STANDING):  diVALproex DR 750milliGRAM(s) Oral two times a day  gabapentin 300milliGRAM(s) Oral three times a day  nicotine -  14 mG/24Hr(s) Patch 1patch Transdermal daily  cholecalciferol 1000Unit(s) Oral daily  docusate sodium 100milliGRAM(s) Oral two times a day  OLANZapine 15milliGRAM(s) Oral at bedtime  paliperidone ER 6milliGRAM(s) Oral daily    MEDICATIONS  (PRN):  diphenhydrAMINE   Capsule 50milliGRAM(s) Oral at bedtime PRN Insomnia  ondansetron    Tablet 4milliGRAM(s) Oral every 6 hours PRN Nausea and/or Vomiting  acetaminophen   Tablet. 650milliGRAM(s) Oral every 6 hours PRN Moderate Pain (4 - 6)

## 2017-06-16 NOTE — PROGRESS NOTE BEHAVIORAL HEALTH - PROBLEM SELECTOR PLAN 1
a.	Continue Zyprexa 15mg qhs and Paliperdone ER 6mg PO daily.   b.	Continue Depakote 750mg BID. VPA = 67 on 6/9/17  c.	I/G/M therapy.

## 2017-06-16 NOTE — PROGRESS NOTE BEHAVIORAL HEALTH - CASE SUMMARY
at time of admission:  52 y/o single, unemployed,  male domiciled at [facility] with a PPH of multiple psychiatric hospitalizations and schizoaffective disorder BIB self for depressed mood and SI stating “I don’t feel like living.” Patient with command auditory hallucinations and questionable visual hallucinations. He appears depressed and endorses suicidal ideation with a plan however does not intend to execute during inpatient stay. Probable schizoaffective d/o vs. MDD in conjunction with AH and questionable VH. Experiencing less auditory hallucinations from baseline today. Pleasant, calm, cooperative, with fair eye contact on encounter. Requires inpatient hospitalization at this time for the safety of the patient, stabilization of symptoms and for safe discharge planning.  Patient responding to switch to Invega from Zyprexa with better mood; less impoverishment of thinking; slightly less constriction; states that "drones" are disappearing;  SI diminishing; today avoiding eye contact stating he was "okay" denied sleep appetite issues minor discomfort issues addressed with Gabapentin.

## 2017-06-16 NOTE — PROGRESS NOTE BEHAVIORAL HEALTH - DETAILS
Nerve pain in bilateral lower extremities, lower back pain they're telling me that I'm an asshole because I'm killing them with a gun.

## 2017-06-17 RX ADMIN — OLANZAPINE 15 MILLIGRAM(S): 15 TABLET, FILM COATED ORAL at 21:05

## 2017-06-17 RX ADMIN — GABAPENTIN 300 MILLIGRAM(S): 400 CAPSULE ORAL at 18:47

## 2017-06-17 RX ADMIN — PALIPERIDONE 6 MILLIGRAM(S): 1.5 TABLET, EXTENDED RELEASE ORAL at 10:43

## 2017-06-17 RX ADMIN — DIVALPROEX SODIUM 750 MILLIGRAM(S): 500 TABLET, DELAYED RELEASE ORAL at 07:08

## 2017-06-17 RX ADMIN — DIVALPROEX SODIUM 750 MILLIGRAM(S): 500 TABLET, DELAYED RELEASE ORAL at 18:47

## 2017-06-17 RX ADMIN — Medication 50 MILLIGRAM(S): at 21:05

## 2017-06-17 RX ADMIN — Medication 1 PATCH: at 18:44

## 2017-06-17 RX ADMIN — GABAPENTIN 300 MILLIGRAM(S): 400 CAPSULE ORAL at 21:05

## 2017-06-17 RX ADMIN — Medication 1000 UNIT(S): at 10:43

## 2017-06-17 RX ADMIN — Medication 100 MILLIGRAM(S): at 10:43

## 2017-06-17 RX ADMIN — GABAPENTIN 300 MILLIGRAM(S): 400 CAPSULE ORAL at 07:07

## 2017-06-17 RX ADMIN — Medication 100 MILLIGRAM(S): at 21:05

## 2017-06-18 RX ADMIN — DIVALPROEX SODIUM 750 MILLIGRAM(S): 500 TABLET, DELAYED RELEASE ORAL at 09:44

## 2017-06-18 RX ADMIN — Medication 1 PATCH: at 14:09

## 2017-06-18 RX ADMIN — GABAPENTIN 300 MILLIGRAM(S): 400 CAPSULE ORAL at 21:20

## 2017-06-18 RX ADMIN — GABAPENTIN 300 MILLIGRAM(S): 400 CAPSULE ORAL at 14:10

## 2017-06-18 RX ADMIN — Medication 1000 UNIT(S): at 09:43

## 2017-06-18 RX ADMIN — OLANZAPINE 15 MILLIGRAM(S): 15 TABLET, FILM COATED ORAL at 21:20

## 2017-06-18 RX ADMIN — Medication 100 MILLIGRAM(S): at 21:20

## 2017-06-18 RX ADMIN — Medication 1 PATCH: at 14:11

## 2017-06-18 RX ADMIN — PALIPERIDONE 6 MILLIGRAM(S): 1.5 TABLET, EXTENDED RELEASE ORAL at 09:43

## 2017-06-18 RX ADMIN — DIVALPROEX SODIUM 750 MILLIGRAM(S): 500 TABLET, DELAYED RELEASE ORAL at 21:21

## 2017-06-18 RX ADMIN — Medication 100 MILLIGRAM(S): at 09:43

## 2017-06-18 RX ADMIN — GABAPENTIN 300 MILLIGRAM(S): 400 CAPSULE ORAL at 09:43

## 2017-06-19 PROCEDURE — 99232 SBSQ HOSP IP/OBS MODERATE 35: CPT

## 2017-06-19 RX ORDER — PALIPERIDONE 1.5 MG/1
9 TABLET, EXTENDED RELEASE ORAL DAILY
Qty: 0 | Refills: 0 | Status: DISCONTINUED | OUTPATIENT
Start: 2017-06-20 | End: 2017-06-30

## 2017-06-19 RX ORDER — OLANZAPINE 15 MG/1
10 TABLET, FILM COATED ORAL AT BEDTIME
Qty: 0 | Refills: 0 | Status: DISCONTINUED | OUTPATIENT
Start: 2017-06-19 | End: 2017-06-22

## 2017-06-19 RX ORDER — PALIPERIDONE 1.5 MG/1
3 TABLET, EXTENDED RELEASE ORAL ONCE
Qty: 0 | Refills: 0 | Status: COMPLETED | OUTPATIENT
Start: 2017-06-19 | End: 2017-06-19

## 2017-06-19 RX ADMIN — Medication 100 MILLIGRAM(S): at 21:37

## 2017-06-19 RX ADMIN — PALIPERIDONE 3 MILLIGRAM(S): 1.5 TABLET, EXTENDED RELEASE ORAL at 13:31

## 2017-06-19 RX ADMIN — GABAPENTIN 300 MILLIGRAM(S): 400 CAPSULE ORAL at 07:36

## 2017-06-19 RX ADMIN — GABAPENTIN 300 MILLIGRAM(S): 400 CAPSULE ORAL at 21:37

## 2017-06-19 RX ADMIN — Medication 100 MILLIGRAM(S): at 10:40

## 2017-06-19 RX ADMIN — PALIPERIDONE 6 MILLIGRAM(S): 1.5 TABLET, EXTENDED RELEASE ORAL at 10:39

## 2017-06-19 RX ADMIN — DIVALPROEX SODIUM 750 MILLIGRAM(S): 500 TABLET, DELAYED RELEASE ORAL at 07:36

## 2017-06-19 RX ADMIN — OLANZAPINE 10 MILLIGRAM(S): 15 TABLET, FILM COATED ORAL at 21:37

## 2017-06-19 RX ADMIN — Medication 50 MILLIGRAM(S): at 21:39

## 2017-06-19 RX ADMIN — GABAPENTIN 300 MILLIGRAM(S): 400 CAPSULE ORAL at 13:34

## 2017-06-19 RX ADMIN — Medication 1000 UNIT(S): at 10:40

## 2017-06-19 RX ADMIN — Medication 1 PATCH: at 15:01

## 2017-06-19 RX ADMIN — Medication 1 PATCH: at 13:34

## 2017-06-19 NOTE — PROGRESS NOTE BEHAVIORAL HEALTH - PROBLEM SELECTOR PLAN 1
a.	Taper Olanzapine to 10mg qhs and titrate Paliperdone ER 9mg PO daily.   b.	Continue Depakote 750mg BID. VPA = 67 on 6/9/17  c.	I/G/M therapy.

## 2017-06-19 NOTE — PROGRESS NOTE BEHAVIORAL HEALTH - NSBHFUPINTERVALHXFT_PSY_A_CORE
Per nursing note, patient sleeping through the night without episode of distress nor agitation while maintained on q15 minute observations and purposeful proactive rounding q2 hours. He is compliant with his medications; mood is neutral with blunted affect. Irritable at times and guarded on approach. No behavioral agitation nor self-injurious behavior with behavioral control. Continuing to monitor.     Patient was seen individually this morning on unit, dressed in hospital attire, guarded on approach, poor eye contact with blunted affect. Endorses he is feeling “alright” without much change in mood. Admits the “clones” are now informing him to “leave” the premises. Notes attendance of some group therapy sessions over the weekend. No changes in sleep hygiene or appetite. Per patient, compliant with meds and denies any adverse reactions. Continues to endorse SI however feels safe on the unit and does not intend to execute plan inpatient. Denies N/V/D, constipation or any other physical complaints. Patient continues to deny HI, VH and does not exhibit grossly delusional or paranoid behavior.    CURRENT MEDICATIONS:  MEDICATIONS  (STANDING):  diVALproex DR 750milliGRAM(s) Oral two times a day  gabapentin 300milliGRAM(s) Oral three times a day  nicotine -  14 mG/24Hr(s) Patch 1patch Transdermal daily  cholecalciferol 1000Unit(s) Oral daily  docusate sodium 100milliGRAM(s) Oral two times a day  OLANZapine 15milliGRAM(s) Oral at bedtime  paliperidone ER 6milliGRAM(s) Oral daily    MEDICATIONS  (PRN):  diphenhydrAMINE   Capsule 50milliGRAM(s) Oral at bedtime PRN Insomnia  ondansetron    Tablet 4milliGRAM(s) Oral every 6 hours PRN Nausea and/or Vomiting  acetaminophen   Tablet. 650milliGRAM(s) Oral every 6 hours PRN Moderate Pain (4 - 6)

## 2017-06-19 NOTE — PROGRESS NOTE BEHAVIORAL HEALTH - DETAILS
Nerve pain in bilateral lower extremities, lower back pain they're telling me to leave and go jump in front of a train.

## 2017-06-19 NOTE — PROGRESS NOTE BEHAVIORAL HEALTH - CASE SUMMARY
52 y/o single, unemployed,  male domiciled at [facility] with a PPH of multiple psychiatric hospitalizations and schizoaffective disorder BIB self for depressed mood and SI stating “I don’t feel like living.” Patient with command auditory hallucinations and questionable visual hallucinations. He appears depressed and endorses suicidal ideation with a plan however does not intend to execute during inpatient stay.  ;;  While lying in bed does make limited eye contact.   endorses SI and AH with inappropriate affect; constricted and sad;  adls poor.

## 2017-06-20 PROCEDURE — 99232 SBSQ HOSP IP/OBS MODERATE 35: CPT

## 2017-06-20 RX ADMIN — DIVALPROEX SODIUM 750 MILLIGRAM(S): 500 TABLET, DELAYED RELEASE ORAL at 08:03

## 2017-06-20 RX ADMIN — Medication 100 MILLIGRAM(S): at 10:37

## 2017-06-20 RX ADMIN — PALIPERIDONE 9 MILLIGRAM(S): 1.5 TABLET, EXTENDED RELEASE ORAL at 10:37

## 2017-06-20 RX ADMIN — GABAPENTIN 300 MILLIGRAM(S): 400 CAPSULE ORAL at 14:09

## 2017-06-20 RX ADMIN — Medication 1 PATCH: at 14:09

## 2017-06-20 RX ADMIN — GABAPENTIN 300 MILLIGRAM(S): 400 CAPSULE ORAL at 08:03

## 2017-06-20 RX ADMIN — Medication 100 MILLIGRAM(S): at 22:27

## 2017-06-20 RX ADMIN — OLANZAPINE 10 MILLIGRAM(S): 15 TABLET, FILM COATED ORAL at 22:28

## 2017-06-20 RX ADMIN — Medication 1 PATCH: at 14:10

## 2017-06-20 RX ADMIN — GABAPENTIN 300 MILLIGRAM(S): 400 CAPSULE ORAL at 22:28

## 2017-06-20 RX ADMIN — Medication 1000 UNIT(S): at 10:37

## 2017-06-20 NOTE — PROGRESS NOTE BEHAVIORAL HEALTH - NSBHFUPINTERVALHXFT_PSY_A_CORE
Per nursing note, patient sleeping through the night without episode of distress nor agitation while maintained on q15 minute observations and purposeful proactive rounding q2 hours. He is compliant with his medications; continues rather guarded and avoidant but a little more accessible; continues blocked mostly spending time in his room.      Patient was seen individually this morning on unit, dressed in hospital attire, guarded on approach, poor eye contact with blunted affect. Endorses he is feeling “alright” without much change in mood. Admits the “clones” are now informing him to “leave” the premises. Notes attendance of some group therapy sessions over the weekend. No changes in sleep hygiene or appetite. Per patient, compliant with meds and denies any adverse reactions. Continues to endorse SI however feels safe on the unit and does not intend to execute plan inpatient. Denies N/V/D, constipation or any other physical complaints. Patient continues to deny HI, VH and does not exhibit grossly delusional or paranoid behavior.    CURRENT MEDICATIONS:  MEDICATIONS  (STANDING):  diVALproex DR 750milliGRAM(s) Oral two times a day  gabapentin 300milliGRAM(s) Oral three times a day  nicotine -  14 mG/24Hr(s) Patch 1patch Transdermal daily  cholecalciferol 1000Unit(s) Oral daily  docusate sodium 100milliGRAM(s) Oral two times a day  OLANZapine 10milliGRAM(s) Oral at bedtime  paliperidone ER 9milliGRAM(s) Oral daily    MEDICATIONS  (PRN):  diphenhydrAMINE   Capsule 50milliGRAM(s) Oral at bedtime PRN Insomnia  ondansetron    Tablet 4milliGRAM(s) Oral every 6 hours PRN Nausea and/or Vomiting  acetaminophen   Tablet. 650milliGRAM(s) Oral every 6 hours PRN Moderate Pain (4 - 6)

## 2017-06-20 NOTE — PROGRESS NOTE BEHAVIORAL HEALTH - PROBLEM SELECTOR PLAN 1
a.	Tapered  Olanzapine to 10mg qhs and titrate Paliperdone ER 9mg PO daily.   b.	Continue Depakote 750mg BID. VPA = 67 on 6/9/17  c.	I/G/M therapy.

## 2017-06-20 NOTE — PROGRESS NOTE BEHAVIORAL HEALTH - DETAILS
Nerve pain in bilateral lower extremities, lower back pain they're telling me to leave and go jump in front of a train.  However less prominent lately

## 2017-06-20 NOTE — PROGRESS NOTE BEHAVIORAL HEALTH - RISK ASSESSMENT
Patient represents a clear risk to self and requires inpatient level of care.  	Risk factors: history of multiple psychiatric hospitalizations, male, substance      abuse, unemployed, domicile in shelter, prior suicide attempt   	Protective factors: seeking treatment, prior outpatient therapy  Modifiable factors: psychotic sxs improving; ADLs and better sleep (some improvement)

## 2017-06-21 PROCEDURE — 99231 SBSQ HOSP IP/OBS SF/LOW 25: CPT

## 2017-06-21 RX ADMIN — Medication 1000 UNIT(S): at 10:11

## 2017-06-21 RX ADMIN — DIVALPROEX SODIUM 750 MILLIGRAM(S): 500 TABLET, DELAYED RELEASE ORAL at 07:41

## 2017-06-21 RX ADMIN — Medication 100 MILLIGRAM(S): at 21:35

## 2017-06-21 RX ADMIN — GABAPENTIN 300 MILLIGRAM(S): 400 CAPSULE ORAL at 07:41

## 2017-06-21 RX ADMIN — Medication 50 MILLIGRAM(S): at 21:35

## 2017-06-21 RX ADMIN — Medication 1 PATCH: at 14:13

## 2017-06-21 RX ADMIN — OLANZAPINE 10 MILLIGRAM(S): 15 TABLET, FILM COATED ORAL at 21:35

## 2017-06-21 RX ADMIN — DIVALPROEX SODIUM 750 MILLIGRAM(S): 500 TABLET, DELAYED RELEASE ORAL at 17:40

## 2017-06-21 RX ADMIN — GABAPENTIN 300 MILLIGRAM(S): 400 CAPSULE ORAL at 21:35

## 2017-06-21 RX ADMIN — GABAPENTIN 300 MILLIGRAM(S): 400 CAPSULE ORAL at 13:59

## 2017-06-21 RX ADMIN — PALIPERIDONE 9 MILLIGRAM(S): 1.5 TABLET, EXTENDED RELEASE ORAL at 10:11

## 2017-06-21 RX ADMIN — Medication 100 MILLIGRAM(S): at 10:11

## 2017-06-21 NOTE — PROGRESS NOTE BEHAVIORAL HEALTH - CASE SUMMARY
52 y/o single, unemployed,  male domiciled at [facility] with a PPH of multiple psychiatric hospitalizations and schizoaffective disorder BIB self for depressed mood and SI stating “I don’t feel like living.” Patient with command auditory hallucinations and questionable visual hallucinations. He appears depressed and endorses suicidal ideation with a plan however does not intend to execute during inpatient stay. Probable schizoaffective d/o vs. MDD in conjunction with AH and questionable VH. Patient was compliant with laboratory draw this morning as well as obtaining vital signs however initially refused his medications earlier in the day. Remains isolative and guarded requiring inpatient hospitalization at this time for the safety of the patient, stabilization of symptoms and for safe discharge planning.   ;; currently patient remains in bed making limited eye contact and is endorsing SI and AH   "My life is in shambles" ; continues depressed and psychotic in need of continued treatment.

## 2017-06-21 NOTE — PROGRESS NOTE BEHAVIORAL HEALTH - NSBHFUPINTERVALHXFT_PSY_A_CORE
Per nursing note, patient was visible on the unit, calm social on approach. Patient’s affect is slightly brighter and he reports improvement in his mood. Patient accepted scheduled medications and was observed sleeping throughout the night. Purposeful, proactive rounding in progress.     Patient was seen individually this morning on unit, dressed in hospital attire wearing self-made necklace, fair eye contact, brighter affect, calm, pleasant on approach. Endorses feeling “alright” and has noted improvement in mood. Admits he has not heard the “clones” as of yet today, which he attributes to the increase in Invega. Notes attendance of 3 group therapy sessions yesterday, his favorite of which being the movement group, and intends to attend several groups today. No changes in sleep hygiene or appetite. Compliant with meds and denies any adverse reactions. Continues to endorse SI however feels safe on the unit and does not intend to execute plan inpatient. Denies N/V/D, constipation or any other physical complaints. Patient continues to deny HI, VH and does not exhibit grossly delusional or paranoid behavior.    MEDICATIONS  (STANDING):  diVALproex DR 750milliGRAM(s) Oral two times a day  gabapentin 300milliGRAM(s) Oral three times a day  nicotine -  14 mG/24Hr(s) Patch 1patch Transdermal daily  cholecalciferol 1000Unit(s) Oral daily  docusate sodium 100milliGRAM(s) Oral two times a day  OLANZapine 10milliGRAM(s) Oral at bedtime  paliperidone ER 9milliGRAM(s) Oral daily    MEDICATIONS  (PRN):  diphenhydrAMINE   Capsule 50milliGRAM(s) Oral at bedtime PRN Insomnia  ondansetron    Tablet 4milliGRAM(s) Oral every 6 hours PRN Nausea and/or Vomiting  acetaminophen   Tablet. 650milliGRAM(s) Oral every 6 hours PRN Moderate Pain (4 - 6)

## 2017-06-21 NOTE — PROGRESS NOTE BEHAVIORAL HEALTH - PROBLEM SELECTOR PLAN 1
a.	Continue Olanzapine 10mg qhs and Paliperdone ER 9mg PO daily with intention to administer Long-Acting Injectable prior to discharge  b.	Continue Depakote 750mg BID. VPA = 67 on 6/9/17  c.	I/G/M therapy.

## 2017-06-21 NOTE — PROGRESS NOTE BEHAVIORAL HEALTH - MODIFICATIONS
agree with plan to continue Invega ; might also consider adding Vitamin D in view of deficiency; also lowering pm Zyprexa in view of possible sedation.

## 2017-06-22 PROCEDURE — 99231 SBSQ HOSP IP/OBS SF/LOW 25: CPT

## 2017-06-22 RX ORDER — OLANZAPINE 15 MG/1
5 TABLET, FILM COATED ORAL AT BEDTIME
Qty: 0 | Refills: 0 | Status: DISCONTINUED | OUTPATIENT
Start: 2017-06-22 | End: 2017-06-26

## 2017-06-22 RX ADMIN — DIVALPROEX SODIUM 750 MILLIGRAM(S): 500 TABLET, DELAYED RELEASE ORAL at 07:55

## 2017-06-22 RX ADMIN — Medication 100 MILLIGRAM(S): at 11:30

## 2017-06-22 RX ADMIN — Medication 50 MILLIGRAM(S): at 21:32

## 2017-06-22 RX ADMIN — Medication 1000 UNIT(S): at 11:31

## 2017-06-22 RX ADMIN — DIVALPROEX SODIUM 750 MILLIGRAM(S): 500 TABLET, DELAYED RELEASE ORAL at 17:18

## 2017-06-22 RX ADMIN — PALIPERIDONE 9 MILLIGRAM(S): 1.5 TABLET, EXTENDED RELEASE ORAL at 11:30

## 2017-06-22 RX ADMIN — OLANZAPINE 5 MILLIGRAM(S): 15 TABLET, FILM COATED ORAL at 21:31

## 2017-06-22 RX ADMIN — GABAPENTIN 300 MILLIGRAM(S): 400 CAPSULE ORAL at 21:31

## 2017-06-22 RX ADMIN — Medication 1 PATCH: at 11:35

## 2017-06-22 RX ADMIN — GABAPENTIN 300 MILLIGRAM(S): 400 CAPSULE ORAL at 07:54

## 2017-06-22 RX ADMIN — GABAPENTIN 300 MILLIGRAM(S): 400 CAPSULE ORAL at 12:52

## 2017-06-22 RX ADMIN — Medication 100 MILLIGRAM(S): at 21:31

## 2017-06-22 RX ADMIN — Medication 1 PATCH: at 15:09

## 2017-06-22 NOTE — PROGRESS NOTE BEHAVIORAL HEALTH - NSBHFUPSUICINTERVALFT_PSY_A_CORE
no specific plans but voices are commanding him;  nonetheless mood continues to improve today and more verbal and better related than any time since admission.
no specific plans but voices may possibly be commanding him;  nonetheless mood continues   better today and more verbal and better related than any time since admission.
no specific plans but voices may possibly be commanding him;  nonetheless mood continues to improve today and more verbal and better related than any time since admission.
no specific plans but voices are commanding him;  nonetheless mood continues to improve today and more verbal and better related than any time since admission.
no specific plans but voices may possibly be commanding him;  nonetheless mood slightly better today and more verbal and better related than any time since admission.
no specific plans but voices may possibly be commanding him;  nonetheless mood slightly better today and more verbal and better related than any time since admission.

## 2017-06-22 NOTE — PROGRESS NOTE BEHAVIORAL HEALTH - CASE SUMMARY
50 y/o single, unemployed,  male domiciled at [facility] with a PPH of multiple psychiatric hospitalizations and schizoaffective disorder BIB self for depressed mood and SI.  Continues to endorse AH, with low mood and intermittent suicidal ideation.  feels safe in the hospital.  Denies suicidal intent/plan.  Visible on the unit, attending groups, in behavioral control. Slept well in the hospital.  Compliant with medications, no side effects.  a/p: schizoaffective disorder -cont cross taper of olanzapine/paliperidone as above with plan for ZAMORA.  -no indication for 1:1 at this time.

## 2017-06-22 NOTE — PROGRESS NOTE BEHAVIORAL HEALTH - NSBHADMITCOUNSEL_PSY_A_CORE
risk factor reduction/diagnostic results/impressions and/or recommended studies/risks and benefits of treatment options/importance of adherence to chosen treatment/instructions for management, treatment and follow up/prognosis

## 2017-06-22 NOTE — PROGRESS NOTE BEHAVIORAL HEALTH - DETAILS
Nerve pain in bilateral lower extremities, lower back pain "the clones are coming less, maybe they're sleeping"

## 2017-06-22 NOTE — PROGRESS NOTE BEHAVIORAL HEALTH - NSBHADMITIPOBSFT_PSY_A_CORE
directable; no intent to hurt self despite some  SI; compliant with medication; directable; no intent to hurt self on the unit despite SI; compliant with medication; no acute distress/anxiety

## 2017-06-22 NOTE — PROGRESS NOTE BEHAVIORAL HEALTH - PROBLEM SELECTOR PLAN 1
a.	Decrease Olanzapine to 5mg qhs and Paliperdone ER 9mg PO daily with intention to administer Long-Acting Injectable prior to discharge  b.	Continue Depakote 750mg BID. VPA = 67 on 6/9/17  c.	I/G/M therapy.

## 2017-06-22 NOTE — PROGRESS NOTE BEHAVIORAL HEALTH - NSBHFUPINTERVALHXFT_PSY_A_CORE
Per nursing note, 24 hour interdisciplinary notes reviewed. Patient observed to be visible on the unit, mostly watching TV, pleasant on approach, reports feeling “alright.” Patient also requested and received Benadryl 50mg po PRN at 2135 with good effect in addition to his scheduled medications. Patient has also been observed sleeping without any problems thus far. Proactive, purposeful rounding in progress. Will continue to monitor patient.     Patient was seen individually this morning on unit, dressed in hospital attire wearing self-made necklace, fair eye contact, calm, pleasant on approach. Endorses feeling “alright” and notes enjoying the group therapy sessions. Admits he began to hear the “clones” yet again yesterday. States that the AH reappeared after a couple hours of silence. No changes in sleep hygiene or appetite. Compliant with meds and denies any adverse reactions. Continues to endorse SI however feels safe on the unit and does not intend to execute plan inpatient. Denies N/V/D, constipation or any other physical complaints. Patient continues to deny HI, VH and does not exhibit grossly delusional or paranoid behavior.    MEDICATIONS  (STANDING):  diVALproex DR 750milliGRAM(s) Oral two times a day  gabapentin 300milliGRAM(s) Oral three times a day  nicotine -  14 mG/24Hr(s) Patch 1patch Transdermal daily  cholecalciferol 1000Unit(s) Oral daily  docusate sodium 100milliGRAM(s) Oral two times a day  paliperidone ER 9milliGRAM(s) Oral daily  OLANZapine 5milliGRAM(s) Oral at bedtime    MEDICATIONS  (PRN):  diphenhydrAMINE   Capsule 50milliGRAM(s) Oral at bedtime PRN Insomnia  ondansetron    Tablet 4milliGRAM(s) Oral every 6 hours PRN Nausea and/or Vomiting  acetaminophen   Tablet. 650milliGRAM(s) Oral every 6 hours PRN Moderate Pain (4 - 6) Per nursing note, "24 hour interdisciplinary notes reviewed. Patient observed to be visible on the unit, mostly watching TV, pleasant on approach, reports feeling “alright.” Patient also requested and received Benadryl 50mg po PRN at 2135 with good effect in addition to his scheduled medications. Patient has also been observed sleeping without any problems thus far. Proactive, purposeful rounding in progress. Will continue to monitor patient. "    Patient was seen individually this morning on unit, dressed in hospital attire wearing self-made necklace, fair eye contact, calm, pleasant on approach. Endorses feeling “alright” and notes enjoying the group therapy sessions. Admits he began to hear the “clones” yet again yesterday. States that the AH reappeared after a couple hours of silence. No changes in sleep hygiene or appetite. Compliant with meds and denies any adverse reactions. Continues to endorse SI however feels safe on the unit and does not intend to execute plan inpatient. Denies N/V/D, constipation or any other physical complaints. Patient continues to deny HI, VH and does not exhibit grossly delusional or paranoid behavior.    MEDICATIONS  (STANDING):  diVALproex DR 750milliGRAM(s) Oral two times a day  gabapentin 300milliGRAM(s) Oral three times a day  nicotine -  14 mG/24Hr(s) Patch 1patch Transdermal daily  cholecalciferol 1000Unit(s) Oral daily  docusate sodium 100milliGRAM(s) Oral two times a day  paliperidone ER 9milliGRAM(s) Oral daily  OLANZapine 5milliGRAM(s) Oral at bedtime    MEDICATIONS  (PRN):  diphenhydrAMINE   Capsule 50milliGRAM(s) Oral at bedtime PRN Insomnia  ondansetron    Tablet 4milliGRAM(s) Oral every 6 hours PRN Nausea and/or Vomiting  acetaminophen   Tablet. 650milliGRAM(s) Oral every 6 hours PRN Moderate Pain (4 - 6)

## 2017-06-23 PROCEDURE — 99231 SBSQ HOSP IP/OBS SF/LOW 25: CPT

## 2017-06-23 RX ADMIN — Medication 50 MILLIGRAM(S): at 21:33

## 2017-06-23 RX ADMIN — GABAPENTIN 300 MILLIGRAM(S): 400 CAPSULE ORAL at 14:00

## 2017-06-23 RX ADMIN — PALIPERIDONE 9 MILLIGRAM(S): 1.5 TABLET, EXTENDED RELEASE ORAL at 09:54

## 2017-06-23 RX ADMIN — Medication 100 MILLIGRAM(S): at 21:32

## 2017-06-23 RX ADMIN — Medication 1 PATCH: at 14:00

## 2017-06-23 RX ADMIN — OLANZAPINE 5 MILLIGRAM(S): 15 TABLET, FILM COATED ORAL at 21:32

## 2017-06-23 RX ADMIN — DIVALPROEX SODIUM 750 MILLIGRAM(S): 500 TABLET, DELAYED RELEASE ORAL at 09:55

## 2017-06-23 RX ADMIN — DIVALPROEX SODIUM 750 MILLIGRAM(S): 500 TABLET, DELAYED RELEASE ORAL at 18:50

## 2017-06-23 RX ADMIN — Medication 1000 UNIT(S): at 09:55

## 2017-06-23 RX ADMIN — Medication 100 MILLIGRAM(S): at 09:55

## 2017-06-23 RX ADMIN — GABAPENTIN 300 MILLIGRAM(S): 400 CAPSULE ORAL at 21:32

## 2017-06-23 RX ADMIN — GABAPENTIN 300 MILLIGRAM(S): 400 CAPSULE ORAL at 09:54

## 2017-06-23 RX ADMIN — Medication 1 PATCH: at 11:44

## 2017-06-24 RX ADMIN — PALIPERIDONE 9 MILLIGRAM(S): 1.5 TABLET, EXTENDED RELEASE ORAL at 09:43

## 2017-06-24 RX ADMIN — Medication 1000 UNIT(S): at 09:43

## 2017-06-24 RX ADMIN — DIVALPROEX SODIUM 750 MILLIGRAM(S): 500 TABLET, DELAYED RELEASE ORAL at 07:16

## 2017-06-24 RX ADMIN — OLANZAPINE 5 MILLIGRAM(S): 15 TABLET, FILM COATED ORAL at 21:31

## 2017-06-24 RX ADMIN — Medication 50 MILLIGRAM(S): at 21:31

## 2017-06-24 RX ADMIN — GABAPENTIN 300 MILLIGRAM(S): 400 CAPSULE ORAL at 21:31

## 2017-06-24 RX ADMIN — GABAPENTIN 300 MILLIGRAM(S): 400 CAPSULE ORAL at 13:52

## 2017-06-24 RX ADMIN — Medication 100 MILLIGRAM(S): at 09:43

## 2017-06-24 RX ADMIN — Medication 100 MILLIGRAM(S): at 21:31

## 2017-06-24 RX ADMIN — GABAPENTIN 300 MILLIGRAM(S): 400 CAPSULE ORAL at 07:16

## 2017-06-24 RX ADMIN — Medication 1 PATCH: at 13:52

## 2017-06-24 RX ADMIN — Medication 1 PATCH: at 13:48

## 2017-06-24 RX ADMIN — DIVALPROEX SODIUM 750 MILLIGRAM(S): 500 TABLET, DELAYED RELEASE ORAL at 17:57

## 2017-06-25 RX ADMIN — GABAPENTIN 300 MILLIGRAM(S): 400 CAPSULE ORAL at 10:14

## 2017-06-25 RX ADMIN — DIVALPROEX SODIUM 750 MILLIGRAM(S): 500 TABLET, DELAYED RELEASE ORAL at 19:05

## 2017-06-25 RX ADMIN — Medication 1000 UNIT(S): at 10:13

## 2017-06-25 RX ADMIN — PALIPERIDONE 9 MILLIGRAM(S): 1.5 TABLET, EXTENDED RELEASE ORAL at 10:14

## 2017-06-25 RX ADMIN — Medication 1 PATCH: at 13:58

## 2017-06-25 RX ADMIN — GABAPENTIN 300 MILLIGRAM(S): 400 CAPSULE ORAL at 13:58

## 2017-06-25 RX ADMIN — DIVALPROEX SODIUM 750 MILLIGRAM(S): 500 TABLET, DELAYED RELEASE ORAL at 10:14

## 2017-06-25 RX ADMIN — OLANZAPINE 5 MILLIGRAM(S): 15 TABLET, FILM COATED ORAL at 21:45

## 2017-06-25 RX ADMIN — Medication 100 MILLIGRAM(S): at 10:13

## 2017-06-25 RX ADMIN — Medication 100 MILLIGRAM(S): at 21:45

## 2017-06-25 RX ADMIN — Medication 50 MILLIGRAM(S): at 21:45

## 2017-06-25 RX ADMIN — GABAPENTIN 300 MILLIGRAM(S): 400 CAPSULE ORAL at 21:45

## 2017-06-26 PROCEDURE — 99231 SBSQ HOSP IP/OBS SF/LOW 25: CPT

## 2017-06-26 RX ORDER — PALIPERIDONE 1.5 MG/1
234 TABLET, EXTENDED RELEASE ORAL ONCE
Qty: 0 | Refills: 0 | Status: COMPLETED | OUTPATIENT
Start: 2017-06-27 | End: 2017-06-27

## 2017-06-26 RX ADMIN — GABAPENTIN 300 MILLIGRAM(S): 400 CAPSULE ORAL at 06:59

## 2017-06-26 RX ADMIN — Medication 1000 UNIT(S): at 10:21

## 2017-06-26 RX ADMIN — Medication 100 MILLIGRAM(S): at 10:21

## 2017-06-26 RX ADMIN — Medication 1 PATCH: at 12:12

## 2017-06-26 RX ADMIN — GABAPENTIN 300 MILLIGRAM(S): 400 CAPSULE ORAL at 15:15

## 2017-06-26 RX ADMIN — PALIPERIDONE 9 MILLIGRAM(S): 1.5 TABLET, EXTENDED RELEASE ORAL at 10:22

## 2017-06-26 RX ADMIN — DIVALPROEX SODIUM 750 MILLIGRAM(S): 500 TABLET, DELAYED RELEASE ORAL at 06:59

## 2017-06-26 RX ADMIN — Medication 100 MILLIGRAM(S): at 21:14

## 2017-06-26 RX ADMIN — GABAPENTIN 300 MILLIGRAM(S): 400 CAPSULE ORAL at 21:14

## 2017-06-26 RX ADMIN — Medication 50 MILLIGRAM(S): at 21:14

## 2017-06-26 NOTE — PROGRESS NOTE BEHAVIORAL HEALTH - NSBHFUPINTERVALHXFT_PSY_A_CORE
Per nursing note, patient is visible, mostly to self, more reactive, voicing needs to staff. Compliant with meds, given PRN Benadryl 50mg po at 2145. Ate snack and slept. Will continue with plan of care.      Patient was seen individually early afternoon on unit, dressed in hospital attire fair eye contact, smiling, calm, pleasant on approach. Endorses feeling “good” and that he no longer has SI secondary to not hearing the “clones” the past two days. Of note, patient admits he has been continuously participating in group therapy sessions, which he finds to be helpful. Compliant with meds and denies any adverse reactions. Denies N/V/D, constipation or any other physical complaints. No changes in sleep hygiene or eating habits. Patient continues to deny HI, VH and does not exhibit grossly delusional or paranoid behavior. Notably brighter affect, cooperative, with no current SI.     MEDICATIONS  (STANDING):  diVALproex DR 750milliGRAM(s) Oral two times a day  gabapentin 300milliGRAM(s) Oral three times a day  nicotine -  14 mG/24Hr(s) Patch 1patch Transdermal daily  cholecalciferol 1000Unit(s) Oral daily  docusate sodium 100milliGRAM(s) Oral two times a day  paliperidone ER 9milliGRAM(s) Oral daily    MEDICATIONS  (PRN):  diphenhydrAMINE   Capsule 50milliGRAM(s) Oral at bedtime PRN Insomnia  ondansetron    Tablet 4milliGRAM(s) Oral every 6 hours PRN Nausea and/or Vomiting  acetaminophen   Tablet. 650milliGRAM(s) Oral every 6 hours PRN Moderate Pain (4 - 6)

## 2017-06-26 NOTE — PROGRESS NOTE BEHAVIORAL HEALTH - CASE SUMMARY
52 y/o single, unemployed,  male domiciled at [facility] with a PPH of multiple psychiatric hospitalizations and schizoaffective disorder BIB self for depressed mood and SI.   ;; 6/26: makes better eye contact; almost smiles at time; spends time to self sitting away from others in the TV lounge.  states that SI and AVH have diminished.  improving ADLs;

## 2017-06-26 NOTE — PROGRESS NOTE BEHAVIORAL HEALTH - NSBHADMITIPOBSFT_PSY_A_CORE
directable; no intent to hurt self on the unit despite SI; compliant with medication; no acute distress/anxiety

## 2017-06-26 NOTE — PROGRESS NOTE BEHAVIORAL HEALTH - PROBLEM SELECTOR PLAN 1
a.	Discontinue Olanzapine. Continue with Paliperdone ER 9mg PO daily. Plan to administer Long-Acting Injectable tomorrow  b.	Continue Depakote 750mg BID. VPA = 67 on 6/9/17  c.	I/G/M therapy.

## 2017-06-26 NOTE — PROGRESS NOTE BEHAVIORAL HEALTH - MODIFICATIONS
in view of response to Invega 9 can soon use SUSTENNA ZAMORA as part of transition to outpatient care.  Discuss substance abuse issues.

## 2017-06-26 NOTE — PROGRESS NOTE BEHAVIORAL HEALTH - RISK ASSESSMENT
Patient represents a clear risk to self and requires inpatient level of care.  	Risk factors: history of multiple psychiatric hospitalizations, male, substance      abuse, unemployed, domicile in shelter, prior suicide attempt   	Protective factors: seeking treatment, prior outpatient therapy Patient represents a clear risk to self and requires inpatient level of care.  	Risk factors: history of multiple psychiatric hospitalizations, male, substance      abuse, unemployed, domicile in shelter, prior suicide attempt   	Protective factors: seeking treatment, prior outpatient therapy  Modifiable Factors: psychosis being addressed with antipsychotic medication; substance abuse issues: to be addressed; patient is beginning to be more communicative.

## 2017-06-27 RX ADMIN — Medication 100 MILLIGRAM(S): at 10:27

## 2017-06-27 RX ADMIN — Medication 1000 UNIT(S): at 11:39

## 2017-06-27 RX ADMIN — GABAPENTIN 300 MILLIGRAM(S): 400 CAPSULE ORAL at 21:55

## 2017-06-27 RX ADMIN — PALIPERIDONE 9 MILLIGRAM(S): 1.5 TABLET, EXTENDED RELEASE ORAL at 10:27

## 2017-06-27 RX ADMIN — Medication 100 MILLIGRAM(S): at 21:55

## 2017-06-27 RX ADMIN — Medication 1 PATCH: at 12:41

## 2017-06-27 RX ADMIN — Medication 50 MILLIGRAM(S): at 21:55

## 2017-06-27 RX ADMIN — DIVALPROEX SODIUM 750 MILLIGRAM(S): 500 TABLET, DELAYED RELEASE ORAL at 10:27

## 2017-06-27 RX ADMIN — DIVALPROEX SODIUM 750 MILLIGRAM(S): 500 TABLET, DELAYED RELEASE ORAL at 18:03

## 2017-06-27 RX ADMIN — Medication 1 PATCH: at 11:40

## 2017-06-27 RX ADMIN — GABAPENTIN 300 MILLIGRAM(S): 400 CAPSULE ORAL at 10:27

## 2017-06-27 RX ADMIN — PALIPERIDONE 234 MILLIGRAM(S): 1.5 TABLET, EXTENDED RELEASE ORAL at 15:47

## 2017-06-27 RX ADMIN — GABAPENTIN 300 MILLIGRAM(S): 400 CAPSULE ORAL at 14:46

## 2017-06-27 NOTE — PROGRESS NOTE BEHAVIORAL HEALTH - NSBHADMITMEDEDUDETAILS_A_CORE FT
Plan to cross titrate Zyprexa with Invega Plan to cross titrate Zyprexa with Abilify (not Invega); Currently at Zyprexa 10mg at night and Abilify 15mg in the day. Plan to cross titrate Zyprexa with Invega; to receive SUSTENNA as ZAMORA.

## 2017-06-27 NOTE — PROGRESS NOTE BEHAVIORAL HEALTH - PROBLEM SELECTOR PLAN 1
a.	Continue with Paliperdone ER 9mg PO daily.   b.           Paliperidone Invega 234mg IM administered today with plan for 156mg IM injection in 7 days  b.	Continue Depakote 750mg BID. VPA = 67 on 6/9/17  c.	I/G/M therapy.

## 2017-06-27 NOTE — PROGRESS NOTE BEHAVIORAL HEALTH - RISK ASSESSMENT
Patient represents a risk to self and requires inpatient level of care to monitor post-injection of long acting medication.  	Risk factors: history of multiple psychiatric hospitalizations, male, substance      abuse, unemployed, domicile in shelter, prior suicide attempt   	Protective factors: seeking treatment, prior outpatient therapy  Modifiable Factors: psychosis being addressed with antipsychotic medication; substance abuse issues: to be addressed; patient is beginning to be more communicative.

## 2017-06-27 NOTE — PROGRESS NOTE BEHAVIORAL HEALTH - NSBHFUPINTERVALHXFT_PSY_A_CORE
Per nursing note, patient visible on unit at intervals. Isolative with constricted affect. Compliant with meds. Complained of difficulty sleeping. Benadryl 50mg PRN at 21:14 given. Slept most of the night. Proactive, purposeful rounding completed. Continue to monitor and observe.     Patient was seen individually this morning watching television, dressed in hospital attire brighter affect, smiling, calm, pleasant on approach. Endorses feeling “good,” which he attributes to a complete cessation of command auditory hallucinations from the “clones.” Of note, his mood is significantly better, verbalizing, “I’m good all around.” Willing to receive IM long-acting injectable today. Continuously participating in group therapy sessions, which he finds to be rather enjoyable. Compliant with meds and denies any adverse reactions. Denies N/V/D, constipation or any other physical complaints. No changes in sleep hygiene or eating habits. Patient continues to deny HI, VH and does not exhibit grossly delusional or paranoid behavior. Denies SI/HI/AH/VH.     MEDICATIONS  (STANDING):  diVALproex  milliGRAM(s) Oral two times a day  gabapentin 300 milliGRAM(s) Oral three times a day  nicotine -  14 mG/24Hr(s) Patch 1 patch Transdermal daily  cholecalciferol 1000 Unit(s) Oral daily  docusate sodium 100 milliGRAM(s) Oral two times a day  paliperidone ER 9 milliGRAM(s) Oral daily  paliperidone Injectable, Long Acting 234 milliGRAM(s) IntraMuscular once    MEDICATIONS  (PRN):  diphenhydrAMINE   Capsule 50 milliGRAM(s) Oral at bedtime PRN Insomnia  ondansetron    Tablet 4 milliGRAM(s) Oral every 6 hours PRN Nausea and/or Vomiting  acetaminophen   Tablet. 650 milliGRAM(s) Oral every 6 hours PRN Moderate Pain (4 - 6)

## 2017-06-27 NOTE — PROGRESS NOTE BEHAVIORAL HEALTH - MODIFICATIONS
need to apply for long term hospitalization in view of chronic needs for supervision; continue behavioral plan ; need to apply for retention in view of  need to arrange  supervision as aftercare; agree with plan for SUSTENNA.

## 2017-06-27 NOTE — PROGRESS NOTE BEHAVIORAL HEALTH - CASE SUMMARY
52 y/o single, unemployed,  male domiciled at [facility] with a PPH of multiple psychiatric hospitalizations and schizoaffective disorder BIB self for depressed mood and SI.   ;; 6/27: extensive discussion with mother; patient aware that there is court re retention issues; patient complained about some throat dyscomfort; application for long term care needed in view of need for aftercare supervision not yet available. 50 y/o single, unemployed,  male domiciled at [facility] with a PPH of multiple psychiatric hospitalizations and schizoaffective disorder BIB self for depressed mood and SI.   ;; 6/27: patient has made progress with better eye contact and decrease in AH and SI.  more visible ; less depressed.

## 2017-06-28 PROCEDURE — 99231 SBSQ HOSP IP/OBS SF/LOW 25: CPT

## 2017-06-28 RX ORDER — IBUPROFEN 200 MG
600 TABLET ORAL EVERY 6 HOURS
Qty: 0 | Refills: 0 | Status: DISCONTINUED | OUTPATIENT
Start: 2017-06-28 | End: 2017-07-06

## 2017-06-28 RX ADMIN — Medication 100 MILLIGRAM(S): at 09:25

## 2017-06-28 RX ADMIN — GABAPENTIN 300 MILLIGRAM(S): 400 CAPSULE ORAL at 21:33

## 2017-06-28 RX ADMIN — GABAPENTIN 300 MILLIGRAM(S): 400 CAPSULE ORAL at 14:03

## 2017-06-28 RX ADMIN — Medication 1 PATCH: at 14:02

## 2017-06-28 RX ADMIN — Medication 100 MILLIGRAM(S): at 21:33

## 2017-06-28 RX ADMIN — Medication 50 MILLIGRAM(S): at 21:33

## 2017-06-28 RX ADMIN — Medication 1 PATCH: at 12:16

## 2017-06-28 RX ADMIN — DIVALPROEX SODIUM 750 MILLIGRAM(S): 500 TABLET, DELAYED RELEASE ORAL at 17:51

## 2017-06-28 RX ADMIN — Medication 1000 UNIT(S): at 09:25

## 2017-06-28 RX ADMIN — PALIPERIDONE 9 MILLIGRAM(S): 1.5 TABLET, EXTENDED RELEASE ORAL at 09:25

## 2017-06-28 NOTE — PROGRESS NOTE BEHAVIORAL HEALTH - MODIFICATIONS
agree with plan for SUSTENNA. SUSTENNA 234mg today and 156mg in one week.  ; To observe after injection ; in view of very poor functioning at admission may need to consider second injection as inpatient.

## 2017-06-28 NOTE — PROGRESS NOTE BEHAVIORAL HEALTH - NSBHFUPINTERVALHXFT_PSY_A_CORE
Per nursing note, patient visible on unit, watched TV in lounge part of the evening. Compliant with meds. Complained of difficulty sleeping. Benadryl 50mg PRN at 21:55 give. Slept most of the night. Minimal interaction with peers. Constricted affect. Proactive, purposeful rounding completed. Continue to monitor and observe.     Patient was seen individually this afternoon awaiting lunch, dressed in hospital attire, calm, pleasant on approach with constricted affect. Endorses feeling “alright,” with continued absence of auditory hallucinations. Repeatedly admits, “things are good all around.” Of note, patient received ZAMORA of Paliperidone, which he tolerated well. Continuously engaged in group therapy sessions, and plans to participate in groups today. Compliant with meds and denies any adverse reactions. Denies N/V/D, constipation or any other physical complaints. Last bowel movement was yesterday, soft, formed. No changes in sleep hygiene or eating habits. Patient continues to deny HI, VH and does not exhibit grossly delusional or paranoid behavior. Denies SI/HI/AH/VH. Patient is cooperative with fair eye contact throughout the encounter.    MEDICATIONS  (STANDING):  diVALproex  milliGRAM(s) Oral two times a day  gabapentin 300 milliGRAM(s) Oral three times a day  nicotine -  14 mG/24Hr(s) Patch 1 patch Transdermal daily  cholecalciferol 1000 Unit(s) Oral daily  docusate sodium 100 milliGRAM(s) Oral two times a day  paliperidone ER 9 milliGRAM(s) Oral daily    MEDICATIONS  (PRN):  diphenhydrAMINE   Capsule 50 milliGRAM(s) Oral at bedtime PRN Insomnia  ondansetron    Tablet 4 milliGRAM(s) Oral every 6 hours PRN Nausea and/or Vomiting  ibuprofen  Tablet 600 milliGRAM(s) Oral every 6 hours PRN moderate aches, pains (4-6)

## 2017-06-28 NOTE — PROGRESS NOTE BEHAVIORAL HEALTH - PROBLEM SELECTOR PLAN 1
a.	Continue with Paliperdone ER 9mg PO daily.   b.           Paliperidone Invega 234mg IM administered yesterday with plan for 156mg IM injection on 7/4/17.  b.	Continue Depakote 750mg BID. VPA = 67 on 6/9/17  c.	I/G/M therapy.

## 2017-06-28 NOTE — PROGRESS NOTE BEHAVIORAL HEALTH - CASE SUMMARY
52 y/o single, unemployed,  male domiciled at [facility] with a PPH of multiple psychiatric hospitalizations and schizoaffective disorder BIB self for depressed mood and SI.   ;; 6/27: patient has made progress with better eye contact and decrease in AH and SI.  more visible ; less depressed. 52 y/o single, unemployed,  male domiciled at [facility] with a PPH of multiple psychiatric hospitalizations and schizoaffective disorder BIB self for depressed mood and SI.   ;; 6/28 mood continues to improve; states that AH are gone and SI is gone; ADLs fair;

## 2017-06-29 PROCEDURE — 99231 SBSQ HOSP IP/OBS SF/LOW 25: CPT

## 2017-06-29 RX ADMIN — GABAPENTIN 300 MILLIGRAM(S): 400 CAPSULE ORAL at 14:33

## 2017-06-29 RX ADMIN — Medication 100 MILLIGRAM(S): at 10:34

## 2017-06-29 RX ADMIN — PALIPERIDONE 9 MILLIGRAM(S): 1.5 TABLET, EXTENDED RELEASE ORAL at 10:33

## 2017-06-29 RX ADMIN — Medication 1000 UNIT(S): at 10:34

## 2017-06-29 RX ADMIN — GABAPENTIN 300 MILLIGRAM(S): 400 CAPSULE ORAL at 07:32

## 2017-06-29 RX ADMIN — DIVALPROEX SODIUM 750 MILLIGRAM(S): 500 TABLET, DELAYED RELEASE ORAL at 07:32

## 2017-06-29 RX ADMIN — Medication 1 PATCH: at 14:33

## 2017-06-29 RX ADMIN — DIVALPROEX SODIUM 750 MILLIGRAM(S): 500 TABLET, DELAYED RELEASE ORAL at 17:31

## 2017-06-29 RX ADMIN — Medication 1 PATCH: at 14:36

## 2017-06-29 RX ADMIN — GABAPENTIN 300 MILLIGRAM(S): 400 CAPSULE ORAL at 21:11

## 2017-06-29 RX ADMIN — Medication 100 MILLIGRAM(S): at 21:11

## 2017-06-29 RX ADMIN — Medication 50 MILLIGRAM(S): at 21:11

## 2017-06-30 PROCEDURE — 99231 SBSQ HOSP IP/OBS SF/LOW 25: CPT

## 2017-06-30 RX ORDER — PALIPERIDONE 1.5 MG/1
9 TABLET, EXTENDED RELEASE ORAL DAILY
Qty: 0 | Refills: 0 | Status: COMPLETED | OUTPATIENT
Start: 2017-07-01 | End: 2017-07-02

## 2017-06-30 RX ORDER — PALIPERIDONE 1.5 MG/1
156 TABLET, EXTENDED RELEASE ORAL ONCE
Qty: 0 | Refills: 0 | Status: COMPLETED | OUTPATIENT
Start: 2017-07-03 | End: 2017-07-03

## 2017-06-30 RX ADMIN — GABAPENTIN 300 MILLIGRAM(S): 400 CAPSULE ORAL at 16:35

## 2017-06-30 RX ADMIN — DIVALPROEX SODIUM 750 MILLIGRAM(S): 500 TABLET, DELAYED RELEASE ORAL at 18:54

## 2017-06-30 RX ADMIN — Medication 100 MILLIGRAM(S): at 21:10

## 2017-06-30 RX ADMIN — Medication 1 PATCH: at 10:15

## 2017-06-30 RX ADMIN — DIVALPROEX SODIUM 750 MILLIGRAM(S): 500 TABLET, DELAYED RELEASE ORAL at 06:47

## 2017-06-30 RX ADMIN — Medication 50 MILLIGRAM(S): at 21:10

## 2017-06-30 RX ADMIN — Medication 600 MILLIGRAM(S): at 21:10

## 2017-06-30 RX ADMIN — GABAPENTIN 300 MILLIGRAM(S): 400 CAPSULE ORAL at 06:46

## 2017-06-30 RX ADMIN — GABAPENTIN 300 MILLIGRAM(S): 400 CAPSULE ORAL at 21:10

## 2017-06-30 RX ADMIN — Medication 100 MILLIGRAM(S): at 10:13

## 2017-06-30 RX ADMIN — Medication 1000 UNIT(S): at 10:13

## 2017-06-30 RX ADMIN — Medication 1 PATCH: at 10:13

## 2017-06-30 RX ADMIN — PALIPERIDONE 9 MILLIGRAM(S): 1.5 TABLET, EXTENDED RELEASE ORAL at 10:13

## 2017-06-30 NOTE — PROGRESS NOTE BEHAVIORAL HEALTH - NSBHFUPSUICINTENT_PSY_A_CORE
none known

## 2017-06-30 NOTE — PROGRESS NOTE BEHAVIORAL HEALTH - CASE SUMMARY
50 y/o single, unemployed,  male domiciled at [facility] with a PPH of multiple psychiatric hospitalizations and schizoaffective disorder BIB self for depressed mood and SI.   ;; 6/28 mood continues to improve; states that AH are gone and SI is gone; ADLs fair; 50 y/o single, unemployed,  male domiciled at [facility] with a PPH of multiple psychiatric hospitalizations and schizoaffective disorder BIB self for depressed mood and SI.   ;; 6/30 discharge focused; sitting looking at a book in an inattentive manner in the TV lounge; makes little eye contact; speech: soft unclear at times. denies AVH or SI.  Wants to know how soon he can get his next injection and leave.  No tremor.  Alert but very blocked and internally preoccupied.  Affect is constricted. While improved from admission still appears to have some psychotic sxs.

## 2017-06-30 NOTE — PROGRESS NOTE BEHAVIORAL HEALTH - NSBHFUPINTERVALHXFT_PSY_A_CORE
Per nursing note, patient was observed out in TV lounge watching TV-during pm. Offered no specific complaints. Patient was compliant with meds-mood cam and in control-Benadryl PRN given at 21:11 for sleep. Patient observed asleep during the night.     Patient was seen individually this morning on unit, dressed in hospital attire, calm, pleasant on approach with blunted affect. Endorses feeling “alright” today however is slightly apprehensive about receiving his second ZAMORA. Explained risks/benefits at length, and consequently patient is now agreeable to treatment plan. Patient remains discharged-focused. Continues to endorse absence of command auditory hallucinations. Engaged in group therapy sessions, with plan to participate in groups today. Compliant with meds and denies any adverse reactions. Denies N/V/D, constipation or any other physical complaints. No changes in sleep hygiene or eating habits. Patient does not exhibit grossly delusional or paranoid behavior. Denies SI/HI/AH/VH. Patient is cooperative, constricted, with poor eye contact throughout the encounter.    MEDICATIONS  (STANDING):  diVALproex  milliGRAM(s) Oral two times a day  gabapentin 300 milliGRAM(s) Oral three times a day  nicotine -  14 mG/24Hr(s) Patch 1 patch Transdermal daily  cholecalciferol 1000 Unit(s) Oral daily  docusate sodium 100 milliGRAM(s) Oral two times a day  paliperidone ER 9 milliGRAM(s) Oral daily    MEDICATIONS  (PRN):  diphenhydrAMINE   Capsule 50 milliGRAM(s) Oral at bedtime PRN Insomnia  ondansetron    Tablet 4 milliGRAM(s) Oral every 6 hours PRN Nausea and/or Vomiting  ibuprofen  Tablet 600 milliGRAM(s) Oral every 6 hours PRN moderate aches, pains (4-6)

## 2017-06-30 NOTE — PROGRESS NOTE BEHAVIORAL HEALTH - MODIFICATIONS
SUSTENNA 234mg today and 156mg in one week.  ; To observe after injection ; in view of very poor functioning at admission may need to consider second injection as inpatient. SUSTENNA 234mg today and 156mg in one week.  ; To observe after injection ; in view of very poor functioning at admission may need to consider second injection as inpatient.  candidate for IOP in  view of psychotic sxs

## 2017-06-30 NOTE — PROGRESS NOTE BEHAVIORAL HEALTH - PROBLEM SELECTOR PLAN 1
a.	Continue with Paliperdone ER 9mg PO daily.   b.           Paliperidone Invega 234mg IM administered yesterday with plan for 156mg IM injection on 7/4/17.  b.	Continue Depakote 750mg BID. VPA = 67 on 6/9/17  c.	I/G/M therapy. a.	Continue with Paliperdone ER 9mg PO daily.   b.           Paliperidone Invega 234mg IM administered on 6/28/17 with plan for 156mg IM injection on 7/4/17.  b.	Continue Depakote 750mg BID. VPA = 67 on 6/9/17  c.	I/G/M therapy.

## 2017-07-01 RX ADMIN — DIVALPROEX SODIUM 750 MILLIGRAM(S): 500 TABLET, DELAYED RELEASE ORAL at 18:40

## 2017-07-01 RX ADMIN — Medication 50 MILLIGRAM(S): at 21:55

## 2017-07-01 RX ADMIN — GABAPENTIN 300 MILLIGRAM(S): 400 CAPSULE ORAL at 15:08

## 2017-07-01 RX ADMIN — DIVALPROEX SODIUM 750 MILLIGRAM(S): 500 TABLET, DELAYED RELEASE ORAL at 07:08

## 2017-07-01 RX ADMIN — Medication 600 MILLIGRAM(S): at 10:04

## 2017-07-01 RX ADMIN — Medication 600 MILLIGRAM(S): at 22:55

## 2017-07-01 RX ADMIN — PALIPERIDONE 9 MILLIGRAM(S): 1.5 TABLET, EXTENDED RELEASE ORAL at 08:47

## 2017-07-01 RX ADMIN — Medication 1 PATCH: at 10:04

## 2017-07-01 RX ADMIN — Medication 1000 UNIT(S): at 08:47

## 2017-07-01 RX ADMIN — Medication 600 MILLIGRAM(S): at 21:55

## 2017-07-01 RX ADMIN — Medication 100 MILLIGRAM(S): at 21:54

## 2017-07-01 RX ADMIN — GABAPENTIN 300 MILLIGRAM(S): 400 CAPSULE ORAL at 07:08

## 2017-07-01 RX ADMIN — Medication 600 MILLIGRAM(S): at 08:47

## 2017-07-01 RX ADMIN — Medication 600 MILLIGRAM(S): at 00:23

## 2017-07-01 RX ADMIN — Medication 1 PATCH: at 15:08

## 2017-07-01 RX ADMIN — GABAPENTIN 300 MILLIGRAM(S): 400 CAPSULE ORAL at 21:54

## 2017-07-01 RX ADMIN — Medication 100 MILLIGRAM(S): at 08:47

## 2017-07-02 RX ADMIN — DIVALPROEX SODIUM 750 MILLIGRAM(S): 500 TABLET, DELAYED RELEASE ORAL at 18:15

## 2017-07-02 RX ADMIN — Medication 600 MILLIGRAM(S): at 18:15

## 2017-07-02 RX ADMIN — Medication 50 MILLIGRAM(S): at 21:52

## 2017-07-02 RX ADMIN — Medication 1 PATCH: at 14:37

## 2017-07-02 RX ADMIN — GABAPENTIN 300 MILLIGRAM(S): 400 CAPSULE ORAL at 14:38

## 2017-07-02 RX ADMIN — GABAPENTIN 300 MILLIGRAM(S): 400 CAPSULE ORAL at 21:52

## 2017-07-02 RX ADMIN — Medication 600 MILLIGRAM(S): at 14:37

## 2017-07-02 RX ADMIN — Medication 1000 UNIT(S): at 09:44

## 2017-07-02 RX ADMIN — DIVALPROEX SODIUM 750 MILLIGRAM(S): 500 TABLET, DELAYED RELEASE ORAL at 06:52

## 2017-07-02 RX ADMIN — PALIPERIDONE 9 MILLIGRAM(S): 1.5 TABLET, EXTENDED RELEASE ORAL at 09:44

## 2017-07-02 RX ADMIN — GABAPENTIN 300 MILLIGRAM(S): 400 CAPSULE ORAL at 06:52

## 2017-07-02 RX ADMIN — Medication 600 MILLIGRAM(S): at 18:51

## 2017-07-02 RX ADMIN — Medication 600 MILLIGRAM(S): at 09:44

## 2017-07-02 RX ADMIN — Medication 100 MILLIGRAM(S): at 09:44

## 2017-07-02 RX ADMIN — Medication 100 MILLIGRAM(S): at 21:52

## 2017-07-03 RX ADMIN — Medication 1 PATCH: at 13:06

## 2017-07-03 RX ADMIN — PALIPERIDONE 156 MILLIGRAM(S): 1.5 TABLET, EXTENDED RELEASE ORAL at 15:40

## 2017-07-03 RX ADMIN — DIVALPROEX SODIUM 750 MILLIGRAM(S): 500 TABLET, DELAYED RELEASE ORAL at 21:40

## 2017-07-03 RX ADMIN — Medication 100 MILLIGRAM(S): at 10:24

## 2017-07-03 RX ADMIN — Medication 100 MILLIGRAM(S): at 21:39

## 2017-07-03 RX ADMIN — GABAPENTIN 300 MILLIGRAM(S): 400 CAPSULE ORAL at 21:40

## 2017-07-03 RX ADMIN — DIVALPROEX SODIUM 750 MILLIGRAM(S): 500 TABLET, DELAYED RELEASE ORAL at 10:24

## 2017-07-03 RX ADMIN — Medication 1000 UNIT(S): at 10:23

## 2017-07-03 RX ADMIN — GABAPENTIN 300 MILLIGRAM(S): 400 CAPSULE ORAL at 10:24

## 2017-07-03 RX ADMIN — Medication 600 MILLIGRAM(S): at 10:23

## 2017-07-03 RX ADMIN — Medication 1 PATCH: at 13:08

## 2017-07-03 RX ADMIN — Medication 50 MILLIGRAM(S): at 21:40

## 2017-07-03 RX ADMIN — GABAPENTIN 300 MILLIGRAM(S): 400 CAPSULE ORAL at 13:06

## 2017-07-03 NOTE — PROGRESS NOTE BEHAVIORAL HEALTH - RISK ASSESSMENT
Patient represents a risk to self and requires inpatient level of care to monitor post-injection of long acting medication.  	Risk factors: history of multiple psychiatric hospitalizations, male, substance      abuse, unemployed, domicile in shelter, prior suicide attempt   Addressable risk issues:  psychotic sxs via meds above; encouraging socialization; poor compliance leading to relapse : NOAH VILLANUEVA.    	Protective factors: seeking treatment, prior outpatient therapy  Modifiable Factors: psychosis being addressed with antipsychotic medication; substance abuse issues: to be addressed; patient is beginning to be more communicative.

## 2017-07-03 NOTE — PROGRESS NOTE BEHAVIORAL HEALTH - NSBHFUPINTERVALHXFT_PSY_A_CORE
continues to focus on leaving; MAR demonstrates compliance; ADLs fair to poor; better eye contact; continues to endorse no more SI or AVH but still suspiciously has some blocking ; although less than in past;  more visible; mood is constricted but less sad        MEDICATIONS  (STANDING):  diVALproex  milliGRAM(s) Oral two times a day  gabapentin 300 milliGRAM(s) Oral three times a day  nicotine -  14 mG/24Hr(s) Patch 1 patch Transdermal daily  cholecalciferol 1000 Unit(s) Oral daily  docusate sodium 100 milliGRAM(s) Oral two times a day  paliperidone ER 9 milliGRAM(s) Oral daily    MEDICATIONS  (PRN):  diphenhydrAMINE   Capsule 50 milliGRAM(s) Oral at bedtime PRN Insomnia  ondansetron    Tablet 4 milliGRAM(s) Oral every 6 hours PRN Nausea and/or Vomiting  ibuprofen  Tablet 600 milliGRAM(s) Oral every 6 hours PRN moderate aches, pains (4-6)

## 2017-07-03 NOTE — PROGRESS NOTE BEHAVIORAL HEALTH - PROBLEM SELECTOR PLAN 1
a.	Continue with Paliperdone ER 9mg PO daily.   b.           Paliperidone Invega 234mg IM administered on 6/28/17 with plan for 156mg IM injection on 7/4/17.  b.	Continue Depakote 750mg BID. VPA = 67 on 6/9/17  c.	I/G/M therapy.

## 2017-07-04 RX ADMIN — Medication 1 PATCH: at 14:41

## 2017-07-04 RX ADMIN — GABAPENTIN 300 MILLIGRAM(S): 400 CAPSULE ORAL at 21:46

## 2017-07-04 RX ADMIN — Medication 600 MILLIGRAM(S): at 10:40

## 2017-07-04 RX ADMIN — Medication 600 MILLIGRAM(S): at 21:46

## 2017-07-04 RX ADMIN — Medication 600 MILLIGRAM(S): at 09:15

## 2017-07-04 RX ADMIN — Medication 50 MILLIGRAM(S): at 21:46

## 2017-07-04 RX ADMIN — Medication 1000 UNIT(S): at 09:11

## 2017-07-04 RX ADMIN — DIVALPROEX SODIUM 750 MILLIGRAM(S): 500 TABLET, DELAYED RELEASE ORAL at 18:02

## 2017-07-04 RX ADMIN — Medication 1 PATCH: at 12:44

## 2017-07-04 RX ADMIN — Medication 100 MILLIGRAM(S): at 21:46

## 2017-07-04 RX ADMIN — DIVALPROEX SODIUM 750 MILLIGRAM(S): 500 TABLET, DELAYED RELEASE ORAL at 07:40

## 2017-07-04 RX ADMIN — GABAPENTIN 300 MILLIGRAM(S): 400 CAPSULE ORAL at 07:39

## 2017-07-04 RX ADMIN — Medication 100 MILLIGRAM(S): at 09:12

## 2017-07-04 RX ADMIN — Medication 600 MILLIGRAM(S): at 22:46

## 2017-07-04 RX ADMIN — GABAPENTIN 300 MILLIGRAM(S): 400 CAPSULE ORAL at 14:41

## 2017-07-05 RX ADMIN — Medication 600 MILLIGRAM(S): at 11:33

## 2017-07-05 RX ADMIN — Medication 100 MILLIGRAM(S): at 10:32

## 2017-07-05 RX ADMIN — DIVALPROEX SODIUM 750 MILLIGRAM(S): 500 TABLET, DELAYED RELEASE ORAL at 19:03

## 2017-07-05 RX ADMIN — Medication 600 MILLIGRAM(S): at 10:33

## 2017-07-05 RX ADMIN — Medication 100 MILLIGRAM(S): at 21:12

## 2017-07-05 RX ADMIN — GABAPENTIN 300 MILLIGRAM(S): 400 CAPSULE ORAL at 10:32

## 2017-07-05 RX ADMIN — Medication 600 MILLIGRAM(S): at 19:04

## 2017-07-05 RX ADMIN — Medication 50 MILLIGRAM(S): at 21:12

## 2017-07-05 RX ADMIN — Medication 1000 UNIT(S): at 10:32

## 2017-07-05 RX ADMIN — GABAPENTIN 300 MILLIGRAM(S): 400 CAPSULE ORAL at 14:55

## 2017-07-05 RX ADMIN — GABAPENTIN 300 MILLIGRAM(S): 400 CAPSULE ORAL at 21:12

## 2017-07-05 RX ADMIN — Medication 600 MILLIGRAM(S): at 19:53

## 2017-07-05 RX ADMIN — DIVALPROEX SODIUM 750 MILLIGRAM(S): 500 TABLET, DELAYED RELEASE ORAL at 10:32

## 2017-07-05 RX ADMIN — Medication 1 PATCH: at 14:55

## 2017-07-05 NOTE — PROGRESS NOTE BEHAVIORAL HEALTH - LANGUAGE
No abnormalities noted

## 2017-07-05 NOTE — PROGRESS NOTE BEHAVIORAL HEALTH - NSBHATTESTSEENBY_PSY_A_CORE
attending Psychiatrist without NP/Trainee
Attending Psychiatrist supervising NP/Trainee, meeting pt...
attending Psychiatrist without NP/Trainee
Attending Psychiatrist supervising NP/Trainee, meeting pt...
Attending Psychiatrist supervising NP/Trainee, meeting pt...

## 2017-07-05 NOTE — PROGRESS NOTE BEHAVIORAL HEALTH - PROBLEM SELECTOR PROBLEM 5
Health care maintenance
R/O Cognitive impairment
R/O Cognitive impairment
Health care maintenance
R/O Cognitive impairment
Health care maintenance
R/O Cognitive impairment
Health care maintenance
R/O Cognitive impairment

## 2017-07-05 NOTE — PROGRESS NOTE BEHAVIORAL HEALTH - AFFECT RANGE
Constricted
Blunted/Constricted
Constricted
Constricted/Blunted
Constricted

## 2017-07-05 NOTE — PROGRESS NOTE BEHAVIORAL HEALTH - AFFECT QUALITY
Depressed/Anxious
Anxious/Depressed
Depressed
Depressed/Anxious

## 2017-07-05 NOTE — PROGRESS NOTE BEHAVIORAL HEALTH - NS ED BHA REVIEW OF ED CHART AVAILABLE IMAGING REVIEWED
None available

## 2017-07-05 NOTE — PROGRESS NOTE BEHAVIORAL HEALTH - NS ED BHA MED ROS CARDIOVASCULAR
No complaints

## 2017-07-05 NOTE — PROGRESS NOTE BEHAVIORAL HEALTH - BODY HABITUS
Average build

## 2017-07-05 NOTE — PROGRESS NOTE BEHAVIORAL HEALTH - ABNORMAL MOVEMENTS
No abnormal movements
Rigidity
No abnormal movements

## 2017-07-05 NOTE — PROGRESS NOTE BEHAVIORAL HEALTH - NS ED BHA MED ROS PSYCHIATRIC
See HPI

## 2017-07-05 NOTE — PROGRESS NOTE BEHAVIORAL HEALTH - NS ED BHA MSE SPEECH RATE
Normal
Slowed
Normal

## 2017-07-05 NOTE — PROGRESS NOTE BEHAVIORAL HEALTH - IMPULSE CONTROL
Normal
Impaired
Normal
Impaired
Normal
Normal

## 2017-07-05 NOTE — PROGRESS NOTE BEHAVIORAL HEALTH - ESTIMATED INTELLIGENCE
Average

## 2017-07-05 NOTE — PROGRESS NOTE BEHAVIORAL HEALTH - PROBLEM SELECTOR PROBLEM 3
Nicotine dependence
Neuropathic pain
Nicotine dependence
Nicotine dependence
Neuropathic pain
Nicotine dependence
Neuropathic pain
Nicotine dependence

## 2017-07-05 NOTE — PROGRESS NOTE BEHAVIORAL HEALTH - AFFECT CONGRUENCE
Congruent

## 2017-07-05 NOTE — PROGRESS NOTE BEHAVIORAL HEALTH - NS ED BHA MSE SPEECH SPONTANEITY
Normal
Increased latency
Normal

## 2017-07-05 NOTE — PROGRESS NOTE BEHAVIORAL HEALTH - NSBHFUPINTERVALCCFT_PSY_A_CORE
“great weekend"
"When am I getting my shot?"
"When am I leaving?"   States that he got his shot yesterday (actually on 7/3); denies all sxs; superficially sleep appetite ok no pain but very d/c focused.
"I'm good"
"I'm good"
“alright”
sleep ok; appetite had breakfast ; no pain at this time.  Feels a little better.
“feeling a little better”
“half happy/half depressed”
still endorses SI and AVH;  slept fair; appetite fair; no pain complaints.
“I’m alright”
“I’m alright”
“I’m depressed”
“I’m fine"
“They cursing at me. They don’t like me”
“clones tell me to die”
“they telling me to leave and go jump in front of a train”
"I had a bowel movement";  sleep ok; appetite ok; no pain at this time.

## 2017-07-05 NOTE — PROGRESS NOTE BEHAVIORAL HEALTH - NSBHCHARTREVIEWVS_PSY_A_CORE FT
Vital Signs Last 24 Hrs  T(C): 37.2, Max: 37.2 (06-25 @ 16:39)  T(F): 98.9, Max: 98.9 (06-25 @ 16:39)  HR: 84 (84 - 84)  BP: 138/84 (138/84 - 138/84)  BP(mean): --  RR: 18 (18 - 18)  SpO2: --
Vital Signs Last 24 Hrs  T(C): 36.9 (05 Jul 2017 17:00), Max: 36.9 (05 Jul 2017 17:00)  T(F): 98.4 (05 Jul 2017 17:00), Max: 98.4 (05 Jul 2017 17:00)  HR: 73 (05 Jul 2017 17:00) (73 - 82)  BP: 148/91 (05 Jul 2017 17:00) (121/84 - 148/91)  BP(mean): --  RR: 17 (05 Jul 2017 17:00) (17 - 18)  SpO2: --
Vital Signs Last 24 Hrs  T(C): 37, Max: 37 (06-07 @ 17:00)  T(F): 98.6, Max: 98.6 (06-07 @ 17:00)  HR: 87 (87 - 87)  BP: 145/94 (145/94 - 145/94)  BP(mean): --  RR: 18 (18 - 18)  SpO2: --
Vital Signs Last 24 Hrs  T(C): 37.1 (03 Jul 2017 09:00), Max: 37.7 (02 Jul 2017 17:00)  T(F): 98.8 (03 Jul 2017 09:00), Max: 99.8 (02 Jul 2017 17:00)  HR: 96 (03 Jul 2017 09:00) (74 - 96)  BP: 122/85 (03 Jul 2017 09:00) (122/85 - 144/91)  BP(mean): --  RR: 18 (03 Jul 2017 09:00) (18 - 18)  SpO2: --
Vital Signs Last 24 Hrs  T(C): 36.9 (27 Jun 2017 09:13), Max: 36.9 (27 Jun 2017 09:13)  T(F): 98.4 (27 Jun 2017 09:13), Max: 98.4 (27 Jun 2017 09:13)  HR: 101 (27 Jun 2017 09:13) (78 - 101)  BP: 109/68 (27 Jun 2017 09:13) (109/68 - 141/87)  BP(mean): --  RR: 20 (27 Jun 2017 09:13) (18 - 20)  SpO2: --
Vital Signs Last 24 Hrs  T(C): 36.9 (28 Jun 2017 09:26), Max: 36.9 (28 Jun 2017 09:26)  T(F): 98.5 (28 Jun 2017 09:26), Max: 98.5 (28 Jun 2017 09:26)  HR: 98 (28 Jun 2017 09:26) (83 - 98)  BP: 95/67 (28 Jun 2017 09:26) (95/67 - 125/83)  BP(mean): --  RR: 20 (28 Jun 2017 09:26) (20 - 20)  SpO2: --
Vital Signs Last 24 Hrs  T(C): 37 (30 Jun 2017 09:40), Max: 37.1 (29 Jun 2017 16:25)  T(F): 98.6 (30 Jun 2017 09:40), Max: 98.8 (29 Jun 2017 16:25)  HR: 86 (30 Jun 2017 09:40) (82 - 89)  BP: 119/77 (30 Jun 2017 09:40) (115/80 - 122/83)  BP(mean): --  RR: 20 (29 Jun 2017 10:12) (20 - 20)  SpO2: --
Vital Signs Last 24 Hrs  T(C): 37.2, Max: 37.2 (06-13 @ 10:00)  T(F): 99, Max: 99 (06-13 @ 10:00)  HR: 87 (72 - 87)  BP: 136/81 (115/65 - 136/81)  BP(mean): --  RR: 18 (17 - 18)  SpO2: --
Vital Signs Last 24 Hrs  T(C): 36.7, Max: 36.7 (06-13 @ 17:00)  T(F): 98, Max: 98 (06-13 @ 17:00)  HR: 71 (71 - 85)  BP: 125/88 (107/64 - 125/88)  BP(mean): --  RR: 20 (18 - 20)  SpO2: --
Vital Signs Last 24 Hrs  T(C): 36.9, Max: 37.1 (06-14 @ 16:42)  T(F): 98.5, Max: 98.8 (06-14 @ 16:42)  HR: 94 (81 - 94)  BP: 119/81 (119/81 - 137/89)  BP(mean): --  RR: 18 (18 - 20)  SpO2: --
Vital Signs Last 24 Hrs  T(C): 37, Max: 37.3 (06-15 @ 16:25)  T(F): 98.6, Max: 99.2 (06-15 @ 16:25)  HR: 99 (74 - 99)  BP: 124/88 (124/88 - 151/94)  BP(mean): --  RR: 18 (16 - 18)  SpO2: --
Vital Signs Last 24 Hrs  T(C): 36.7, Max: 36.7 (06-13 @ 17:00)  T(F): 98, Max: 98 (06-13 @ 17:00)  HR: 71 (71 - 85)  BP: 125/88 (107/64 - 125/88)  BP(mean): --  RR: 20 (18 - 20)  SpO2: --
Vital Signs Last 24 Hrs  T(C): 36.9, Max: 36.9 (06-09 @ 09:48)  T(F): 98.5, Max: 98.5 (06-09 @ 09:48)  HR: 75 (71 - 75)  BP: 103/66 (103/66 - 133/83)  BP(mean): --  RR: 20 (17 - 20)  SpO2: --
Vital Signs Last 24 Hrs  T(C): 36.9, Max: 36.9 (06-19 @ 09:00)  T(F): 98.4, Max: 98.4 (06-19 @ 09:00)  HR: 104 (90 - 104)  BP: 117/85 (117/85 - 125/79)  BP(mean): --  RR: 18 (18 - 18)  SpO2: --
Vital Signs Last 24 Hrs  T(C): 36.9, Max: 36.9 (06-20 @ 16:24)  T(F): 98.5, Max: 98.5 (06-20 @ 16:24)  HR: 18 (18 - 84)  BP: 113/78 (113/78 - 154/93)  BP(mean): --  RR: 18 (16 - 18)  SpO2: --
Vital Signs Last 24 Hrs  T(C): 36.9, Max: 37 (06-11 @ 17:00)  T(F): 98.4, Max: 98.6 (06-11 @ 17:00)  HR: 92 (92 - 99)  BP: 119/84 (119/84 - 135/84)  BP(mean): --  RR: 20 (18 - 20)  SpO2: --
Vital Signs Last 24 Hrs  T(C): 37.2, Max: 37.2 (06-20 @ 09:28)  T(F): 99, Max: 99 (06-20 @ 09:28)  HR: 94 (94 - 112)  BP: 120/89 (120/89 - 122/80)  BP(mean): --  RR: 20 (20 - 20)  SpO2: --
Vital Signs Last 24 Hrs  T(C): 37.2, Max: 37.2 (06-22 @ 09:46)  T(F): 99, Max: 99 (06-22 @ 09:46)  HR: 99 (99 - 108)  BP: 120/81 (120/81 - 126/82)  BP(mean): --  RR: 20 (20 - 20)  SpO2: --

## 2017-07-05 NOTE — PROGRESS NOTE BEHAVIORAL HEALTH - NS ED BHA REVIEW OF ED CHART AVAILABLE LABS REVIEWED
None available
Yes
None available
Yes
Yes

## 2017-07-05 NOTE — PROGRESS NOTE BEHAVIORAL HEALTH - PROBLEM SELECTOR PLAN 2
patient on benefits of abstaining.

## 2017-07-05 NOTE — PROGRESS NOTE BEHAVIORAL HEALTH - BEHAVIOR
Cooperative/Other
Cooperative/Other
Cooperative
Other/Uncooperative
Uncooperative/Other
Uncooperative/Other
Cooperative

## 2017-07-05 NOTE — PROGRESS NOTE BEHAVIORAL HEALTH - GAIT / STATION
Normal gait / station
Other
Normal gait / station

## 2017-07-05 NOTE — PROGRESS NOTE BEHAVIORAL HEALTH - RECENT MEMORY
Impaired

## 2017-07-05 NOTE — PROGRESS NOTE BEHAVIORAL HEALTH - PROBLEM SELECTOR PLAN 5
EDUCATION: Continue to educate patient on risks/benefits of each psychotropic medication and psychotherapeutic modality  PROPHYLAXIS: Will check Folate, Vitamin B12, Vitamin D, Lipid Panel, HbA1c, assess cognition daily.  DISPOSITION: Discharge planning in progress.
MMSE = 28/30 on 6/8/17
MMSE = 28/30 on 6/8/17
EDUCATION: Continue to educate patient on risks/benefits of each psychotropic medication and psychotherapeutic modality  PROPHYLAXIS: Assess cognition daily.  DISPOSITION: Discharge planning in progress.
MMSE = 28/30 on 6/8/17
EDUCATION: Continue to educate patient on risks/benefits of each psychotropic medication and psychotherapeutic modality  PROPHYLAXIS: Assess cognition daily.  DISPOSITION: Discharge planning in progress.
EDUCATION: Continue to educate patient on risks/benefits of each psychotropic medication and psychotherapeutic modality  PROPHYLAXIS: Assess cognition daily.  Cross tapering from Zyprexa to Invega with plan to convert to ZAMORA to enhance safety after d/c.    DISPOSITION: Discharge planning in progress.
EDUCATION: Continue to educate patient on risks/benefits of each psychotropic medication and psychotherapeutic modality  PROPHYLAXIS: Assess cognition daily.  Cross tapering from Zyprexa to Invega with plan to convert to ZAMORA to enhance safety after d/c.    DISPOSITION: Discharge planning in progress.
EDUCATION: Continue to educate patient on risks/benefits of each psychotropic medication and psychotherapeutic modality  PROPHYLAXIS: Assess cognition daily.  DISPOSITION: Discharge planning in progress.
MMSE = 28/30 on 6/8/17
EDUCATION: Continue to educate patient on risks/benefits of each psychotropic medication and psychotherapeutic modality  PROPHYLAXIS: Assess cognition daily.  DISPOSITION: Discharge planning in progress.
MMSE = 28/30 on 6/8/17

## 2017-07-05 NOTE — PROGRESS NOTE BEHAVIORAL HEALTH - NSBHFUPMEDSE_PSY_A_CORE
None known

## 2017-07-05 NOTE — PROGRESS NOTE BEHAVIORAL HEALTH - NSBHLOC_PSY_A_CORE
Alert
Lethargic, arousable to verbal stimulus
Alert
Lethargic, arousable to verbal stimulus
Alert
Lethargic, arousable to verbal stimulus

## 2017-07-05 NOTE — PROGRESS NOTE BEHAVIORAL HEALTH - NSBHCONSORIP_PSY_A_CORE
Inpatient Admission...

## 2017-07-05 NOTE — PROGRESS NOTE BEHAVIORAL HEALTH - PRIMARY DX
Schizoaffective disorder, unspecified type

## 2017-07-05 NOTE — PROGRESS NOTE BEHAVIORAL HEALTH - NSBHFUPTYPE_PSY_A_CORE
Inpatient

## 2017-07-05 NOTE — PROGRESS NOTE BEHAVIORAL HEALTH - NSBHADMITIPDSM_PSY_A_CORE
see above for Axis I, II, III

## 2017-07-05 NOTE — PROGRESS NOTE BEHAVIORAL HEALTH - NSBHADMITDANGERSELF_PSY_A_CORE
suicidal ideation with plan and means

## 2017-07-05 NOTE — PROGRESS NOTE BEHAVIORAL HEALTH - PROBLEM SELECTOR PLAN 6
EDUCATION: Continue to educate patient on risks/benefits of each psychotropic medication and psychotherapeutic modality  DISPOSITION: Discharge planning in progress. Likely discharge with outpatient psychiatry appointment and substance rehabilitation plan later this week.
EDUCATION: Continue to educate patient on risks/benefits of each psychotropic medication and psychotherapeutic modality  DISPOSITION: Discharge planning in progress. Likely discharge with outpatient psychiatry appointment and substance rehabilitation plan later this week.
EDUCATION: Continue to educate patient on risks/benefits of each psychotropic medication and psychotherapeutic modality  DISPOSITION: Discharge planning in progress. Likely discharge with outpatient psychiatry appointment and substance rehabiliation plan later this week.
EDUCATION: Continue to educate patient on risks/benefits of each psychotropic medication and psychotherapeutic modality  DISPOSITION: Discharge planning in progress. Likely discharge with outpatient psychiatry appointment and substance rehabilitation plan later this week.
EDUCATION: Continue to educate patient on risks/benefits of each psychotropic medication and psychotherapeutic modality  DISPOSITION: Discharge planning in progress. Likely discharge with outpatient psychiatry appointment and substance rehabilitation plan later this week.
EDUCATION: Continue to educate patient on risks/benefits of each psychotropic medication and psychotherapeutic modality  DISPOSITION: Discharge planning in progress. To receive L.A.I. prior to discharge.
EDUCATION: Continue to educate patient on risks/benefits of each psychotropic medication and psychotherapeutic modality  DISPOSITION: Discharge planning in progress. To receive L.A.I. prior to discharge.
EDUCATION: Continue to educate patient on risks/benefits of each psychotropic medication and psychotherapeutic modality  PROPHYLAXIS: Assess cognition daily.  Cross tapering from Zyprexa to Invega with plan to convert to ZAMORA to enhance safety after d/c.    DISPOSITION: Discharge planning in progress.

## 2017-07-05 NOTE — PROGRESS NOTE BEHAVIORAL HEALTH - PERCEPTIONS
Auditory hallucinations
No abnormalities
No abnormalities
Auditory hallucinations
No abnormalities

## 2017-07-05 NOTE — PROGRESS NOTE BEHAVIORAL HEALTH - NS ED BHA MSE GENERAL APPEARANCE
Deformities present

## 2017-07-05 NOTE — PROGRESS NOTE BEHAVIORAL HEALTH - NSBHPTASSESSDT_PSY_A_CORE
26-Jun-2017 12:00
03-Jul-2017 10:21
05-Jul-2017 15:33
27-Jun-2017 12:50
28-Jun-2017 12:38
30-Jun-2017 09:47
13-Jun-2017 12:29
15-Nabil-2017 11:39
16-Jun-2017 11:02
08-Jun-2017 13:25
09-Jun-2017 13:03
12-Jun-2017 13:12
13-Jun-2017 12:29
14-Jun-2017 13:11
19-Jun-2017 11:30
19-Jun-2017 11:30
21-Jun-2017 12:08
22-Jun-2017 11:08

## 2017-07-05 NOTE — PROGRESS NOTE BEHAVIORAL HEALTH - THOUGHT CONTENT
Hopelessness/Suicidality
Suicidality/Hopelessness
Suicidality/Hopelessness
Hopelessness/Suicidality
Suicidality/Hopelessness
Hopelessness/Suicidality

## 2017-07-05 NOTE — PROGRESS NOTE BEHAVIORAL HEALTH - NSBHFUPVIOL_PSY_A_CORE
none known

## 2017-07-05 NOTE — PROGRESS NOTE BEHAVIORAL HEALTH - NS ED BHA AXIS I SECONDARY1 CODE FT
F12.20

## 2017-07-05 NOTE — PROGRESS NOTE BEHAVIORAL HEALTH - NSBHADMITMEDEDU_PSY_A_CORE
no
no
yes...
no
no
yes...

## 2017-07-05 NOTE — PROGRESS NOTE BEHAVIORAL HEALTH - MUSCLE TONE / STRENGTH
Normal muscle tone/strength

## 2017-07-05 NOTE — PROGRESS NOTE BEHAVIORAL HEALTH - PROBLEM SELECTOR PROBLEM 4
R/O Cognitive impairment
Nicotine dependence
R/O Cognitive impairment
R/O Cognitive impairment
Nicotine dependence
R/O Cognitive impairment
Nicotine dependence
R/O Cognitive impairment

## 2017-07-05 NOTE — PROGRESS NOTE BEHAVIORAL HEALTH - PROBLEM SELECTOR PROBLEM 6
Health care maintenance

## 2017-07-05 NOTE — PROGRESS NOTE BEHAVIORAL HEALTH - NSBHFUPINTERVALHXFT_PSY_A_CORE
continues to focus on leaving; MAR demonstrates compliance with SUSTENNA 156mg on 7/3 ; ADLs fair to poor; better eye contact; continues to endorse no more SI or AVH but still suspiciously has some blocking ; although less than in past;  more visible; mood is constricted somewhat angry that he is still here.       MEDICATIONS  (STANDING):  diVALproex  milliGRAM(s) Oral two times a day  gabapentin 300 milliGRAM(s) Oral three times a day  nicotine -  14 mG/24Hr(s) Patch 1 patch Transdermal daily  cholecalciferol 1000 Unit(s) Oral daily  docusate sodium 100 milliGRAM(s) Oral two times a day  paliperidone ER 9 milliGRAM(s) Oral daily    MEDICATIONS  (PRN):  diphenhydrAMINE   Capsule 50 milliGRAM(s) Oral at bedtime PRN Insomnia  ondansetron    Tablet 4 milliGRAM(s) Oral every 6 hours PRN Nausea and/or Vomiting  ibuprofen  Tablet 600 milliGRAM(s) Oral every 6 hours PRN moderate aches, pains (4-6)

## 2017-07-05 NOTE — PROGRESS NOTE BEHAVIORAL HEALTH - NS ED BHA MED ROS GASTROINTESTINAL
No complaints
Yes

## 2017-07-05 NOTE — PROGRESS NOTE BEHAVIORAL HEALTH - MOOD
Depressed/Other
Anxious/Angry
Depressed
Depressed
Depressed/Other
Other/Depressed
Other/Depressed
Depressed
Depressed/Other

## 2017-07-05 NOTE — PROGRESS NOTE BEHAVIORAL HEALTH - NSBHADMITIPREASON_PSY_A_CORE
Danger to self; mental illness expected to respond to inpatient care

## 2017-07-05 NOTE — PROGRESS NOTE BEHAVIORAL HEALTH - NS ED BHA MED ROS MUSCULOSKELETAL
No complaints
Patient/Family

## 2017-07-05 NOTE — PROGRESS NOTE BEHAVIORAL HEALTH - PROBLEM SELECTOR PLAN 4
MMSE = 28/30
Will offer Nicotine patch 14mg/24 hrs for equivalent 10cigarettes/day.   patient on benefits of smoking cessation.
Will offer Nicotine patch 14mg/24 hrs for equivalent 10cigarettes/day.   patient on benefits of smoking cessation.
MMSE = 28/30 on 6/8/17
Will offer Nicotine patch 14mg/24 hrs for equivalent 10cigarettes/day.   patient on benefits of smoking cessation.
MMSE = 28/30 on 6/8/17
Will offer Nicotine patch 14mg/24 hrs for equivalent 10cigarettes/day.   patient on benefits of smoking cessation.
MMSE = 28/30 on 6/8/17
Will offer Nicotine patch 14mg/24 hrs for equivalent 10cigarettes/day.   patient on benefits of smoking cessation.

## 2017-07-05 NOTE — PROGRESS NOTE BEHAVIORAL HEALTH - NS ED BHA MSE SPEECH VOLUME
Normal
Soft/Normal
Normal

## 2017-07-05 NOTE — PROGRESS NOTE BEHAVIORAL HEALTH - ADDITIONAL DETAILS / COMMENTS
No mention of "clones"
No mention of "clones"
Says that the still voices have left
a complete cessation of "the clones"
a complete cessation of "the clones" for several days.
a complete cessation of "the clones" for several days.
Says that he still hears voices and has vague ideas of hurting himself but is feeling a bit better.;  makes better eye contact;
Patient is making gains and more social than prior, though still isolative.
Says that he still hears voices and has vague ideas of hurting himself but is feeling a bit better.;  makes better eye contact;
Patient states he still wants to harm himself; but did smile at one time in the interview and shook the writer's hands;

## 2017-07-05 NOTE — PROGRESS NOTE BEHAVIORAL HEALTH - ATTENTION / CONCENTRATION
Normal

## 2017-07-05 NOTE — PROGRESS NOTE BEHAVIORAL HEALTH - SECONDARY DX1
Cannabis use disorder, moderate, dependence

## 2017-07-05 NOTE — PROGRESS NOTE BEHAVIORAL HEALTH - PROBLEM SELECTOR PLAN 3
Will offer Nicotine patch 14mg/24 hrs for equivalent 10cigarettes/day.   patient on benefits of smoking cessation.
Continue with Gabapentin 300mg PO TID  - continue Ibuprofen 600mg PO q6 PRN moderate pains.
Continue with Gabapentin 300mg PO TID  - continue Ibuprofen 600mg PO q6 PRN moderate pains.
Continue with Gabapentin 300mg PO TID  - continue Acetaminophen 650mg PO q6 PRN moderate pains.
Continue with Gabapentin 300mg PO TID  - continue Acetaminophen 650mg PO q6 PRN moderate pains.
Continue with Gabapentin 300mg PO TID  - continue Ibuprofen 600mg PO q6 PRN moderate pains.
Continue with Gabapentin 300mg PO TID  - continue Ibuprofen 600mg PO q6 PRN moderate pains.
Will offer Nicotine patch 14mg/24 hrs for equivalent 10cigarettes/day.   patient on benefits of smoking cessation.
Will offer Nicotine patch 14mg/24 hrs for equivalent 10cigarettes/day.   patient on benefits of smoking cessation.
Continue with Gabapentin 300mg PO TID  - Will add Acetaminophen 650mg PO q6 PRN moderate pains.
Will offer Nicotine patch 14mg/24 hrs for equivalent 10cigarettes/day.   patient on benefits of smoking cessation.
Continue with Gabapentin 300mg PO TID  - Will add Acetaminophen 650mg PO q6 PRN moderate pains.
Continue with Gabapentin 300mg PO TID  - Will add Acetaminophen 650mg PO q6 PRN moderate pains.
Continue with Gabapentin 300mg PO TID  - continue Acetaminophen 650mg PO q6 PRN moderate pains.
Continue with Gabapentin 300mg PO TID  - continue Acetaminophen 650mg PO q6 PRN moderate pains.
Will offer Nicotine patch 14mg/24 hrs for equivalent 10cigarettes/day.   patient on benefits of smoking cessation.

## 2017-07-05 NOTE — PROGRESS NOTE BEHAVIORAL HEALTH - NSBHLEGALSTATUS_PSY_A_CORE
9.13 (Voluntary)

## 2017-07-06 VITALS
DIASTOLIC BLOOD PRESSURE: 80 MMHG | SYSTOLIC BLOOD PRESSURE: 119 MMHG | HEART RATE: 81 BPM | TEMPERATURE: 99 F | RESPIRATION RATE: 18 BRPM

## 2017-07-06 RX ADMIN — Medication 1 PATCH: at 13:50

## 2017-07-06 RX ADMIN — Medication 1 PATCH: at 13:47

## 2017-07-06 RX ADMIN — Medication 100 MILLIGRAM(S): at 09:43

## 2017-07-06 RX ADMIN — Medication 600 MILLIGRAM(S): at 10:46

## 2017-07-06 RX ADMIN — DIVALPROEX SODIUM 750 MILLIGRAM(S): 500 TABLET, DELAYED RELEASE ORAL at 07:01

## 2017-07-06 RX ADMIN — GABAPENTIN 300 MILLIGRAM(S): 400 CAPSULE ORAL at 13:50

## 2017-07-06 RX ADMIN — Medication 600 MILLIGRAM(S): at 09:43

## 2017-07-06 RX ADMIN — Medication 1000 UNIT(S): at 09:43

## 2017-07-06 RX ADMIN — GABAPENTIN 300 MILLIGRAM(S): 400 CAPSULE ORAL at 07:01

## 2017-07-10 DIAGNOSIS — R45.851 SUICIDAL IDEATIONS: ICD-10-CM

## 2017-07-10 DIAGNOSIS — G47.00 INSOMNIA, UNSPECIFIED: ICD-10-CM

## 2017-07-10 DIAGNOSIS — F31.9 BIPOLAR DISORDER, UNSPECIFIED: ICD-10-CM

## 2017-07-10 DIAGNOSIS — Z53.29 PROCEDURE AND TREATMENT NOT CARRIED OUT BECAUSE OF PATIENT'S DECISION FOR OTHER REASONS: ICD-10-CM

## 2017-07-10 DIAGNOSIS — F12.20 CANNABIS DEPENDENCE, UNCOMPLICATED: ICD-10-CM

## 2017-07-10 DIAGNOSIS — M79.2 NEURALGIA AND NEURITIS, UNSPECIFIED: ICD-10-CM

## 2017-07-10 DIAGNOSIS — R11.0 NAUSEA: ICD-10-CM

## 2017-07-10 DIAGNOSIS — F17.210 NICOTINE DEPENDENCE, CIGARETTES, UNCOMPLICATED: ICD-10-CM

## 2017-07-10 DIAGNOSIS — F25.9 SCHIZOAFFECTIVE DISORDER, UNSPECIFIED: ICD-10-CM

## 2017-07-10 DIAGNOSIS — T43.595A ADVERSE EFFECT OF OTHER ANTIPSYCHOTICS AND NEUROLEPTICS, INITIAL ENCOUNTER: ICD-10-CM

## 2017-07-10 DIAGNOSIS — Z91.013 ALLERGY TO SEAFOOD: ICD-10-CM

## 2017-08-07 ENCOUNTER — INPATIENT (INPATIENT)
Facility: HOSPITAL | Age: 52
LOS: 28 days | Discharge: PSYCHIATRIC OR SUBSTANCE USE FACILITY | DRG: 885 | End: 2017-09-05
Attending: PSYCHIATRY & NEUROLOGY | Admitting: PSYCHIATRY & NEUROLOGY
Payer: MEDICAID

## 2017-08-07 VITALS
RESPIRATION RATE: 16 BRPM | DIASTOLIC BLOOD PRESSURE: 86 MMHG | WEIGHT: 166.89 LBS | SYSTOLIC BLOOD PRESSURE: 123 MMHG | HEIGHT: 67 IN | HEART RATE: 92 BPM | TEMPERATURE: 98 F | OXYGEN SATURATION: 99 %

## 2017-08-07 PROBLEM — F31.60 BIPOLAR DISORDER, CURRENT EPISODE MIXED, UNSPECIFIED: Chronic | Status: ACTIVE | Noted: 2017-06-06

## 2017-08-07 PROBLEM — F20.9 SCHIZOPHRENIA, UNSPECIFIED: Chronic | Status: ACTIVE | Noted: 2017-06-06

## 2017-08-07 LAB
ALBUMIN SERPL ELPH-MCNC: 4.4 G/DL — SIGNIFICANT CHANGE UP (ref 3.3–5)
ALP SERPL-CCNC: 86 U/L — SIGNIFICANT CHANGE UP (ref 40–120)
ALT FLD-CCNC: 13 U/L — SIGNIFICANT CHANGE UP (ref 10–45)
ANION GAP SERPL CALC-SCNC: 16 MMOL/L — SIGNIFICANT CHANGE UP (ref 5–17)
APAP SERPL-MCNC: <15 UG/ML — SIGNIFICANT CHANGE UP (ref 10–30)
APPEARANCE UR: CLEAR — SIGNIFICANT CHANGE UP
AST SERPL-CCNC: 21 U/L — SIGNIFICANT CHANGE UP (ref 10–40)
BASOPHILS NFR BLD AUTO: 0.3 % — SIGNIFICANT CHANGE UP (ref 0–2)
BILIRUB SERPL-MCNC: 0.4 MG/DL — SIGNIFICANT CHANGE UP (ref 0.2–1.2)
BILIRUB UR-MCNC: (no result)
BUN SERPL-MCNC: 18 MG/DL — SIGNIFICANT CHANGE UP (ref 7–23)
CALCIUM SERPL-MCNC: 9.7 MG/DL — SIGNIFICANT CHANGE UP (ref 8.4–10.5)
CARBAMAZEPINE SERPL-MCNC: <2 UG/ML — LOW (ref 4–12)
CHLORIDE SERPL-SCNC: 100 MMOL/L — SIGNIFICANT CHANGE UP (ref 96–108)
CO2 SERPL-SCNC: 26 MMOL/L — SIGNIFICANT CHANGE UP (ref 22–31)
COLOR SPEC: YELLOW — SIGNIFICANT CHANGE UP
CREAT SERPL-MCNC: 1 MG/DL — SIGNIFICANT CHANGE UP (ref 0.5–1.3)
DIFF PNL FLD: (no result)
EOSINOPHIL NFR BLD AUTO: 4.2 % — SIGNIFICANT CHANGE UP (ref 0–6)
ETHANOL SERPL-MCNC: <10 MG/DL — SIGNIFICANT CHANGE UP (ref 0–10)
GLUCOSE SERPL-MCNC: 115 MG/DL — HIGH (ref 70–99)
GLUCOSE UR QL: NEGATIVE — SIGNIFICANT CHANGE UP
HCT VFR BLD CALC: 37.8 % — LOW (ref 39–50)
HGB BLD-MCNC: 12.7 G/DL — LOW (ref 13–17)
KETONES UR-MCNC: 15 MG/DL
LEUKOCYTE ESTERASE UR-ACNC: NEGATIVE — SIGNIFICANT CHANGE UP
LITHIUM SERPL-MCNC: <.05 MMOL/L — LOW (ref 0.6–1.2)
LYMPHOCYTES # BLD AUTO: 19.7 % — SIGNIFICANT CHANGE UP (ref 13–44)
MCHC RBC-ENTMCNC: 28.9 PG — SIGNIFICANT CHANGE UP (ref 27–34)
MCHC RBC-ENTMCNC: 33.6 G/DL — SIGNIFICANT CHANGE UP (ref 32–36)
MCV RBC AUTO: 85.9 FL — SIGNIFICANT CHANGE UP (ref 80–100)
MONOCYTES NFR BLD AUTO: 10.5 % — SIGNIFICANT CHANGE UP (ref 2–14)
NEUTROPHILS NFR BLD AUTO: 65.3 % — SIGNIFICANT CHANGE UP (ref 43–77)
NITRITE UR-MCNC: NEGATIVE — SIGNIFICANT CHANGE UP
PCP SPEC-MCNC: SIGNIFICANT CHANGE UP
PH UR: 5.5 — SIGNIFICANT CHANGE UP (ref 5–8)
PLATELET # BLD AUTO: 289 K/UL — SIGNIFICANT CHANGE UP (ref 150–400)
POTASSIUM SERPL-MCNC: 4 MMOL/L — SIGNIFICANT CHANGE UP (ref 3.5–5.3)
POTASSIUM SERPL-SCNC: 4 MMOL/L — SIGNIFICANT CHANGE UP (ref 3.5–5.3)
PROT SERPL-MCNC: 8.1 G/DL — SIGNIFICANT CHANGE UP (ref 6–8.3)
PROT UR-MCNC: (no result) MG/DL
RBC # BLD: 4.4 M/UL — SIGNIFICANT CHANGE UP (ref 4.2–5.8)
RBC # FLD: 14.1 % — SIGNIFICANT CHANGE UP (ref 10.3–16.9)
SALICYLATES SERPL-MCNC: <0.3 MG/DL — LOW (ref 2.8–20)
SODIUM SERPL-SCNC: 142 MMOL/L — SIGNIFICANT CHANGE UP (ref 135–145)
SP GR SPEC: >=1.03 — SIGNIFICANT CHANGE UP (ref 1–1.03)
TSH SERPL-MCNC: 1.13 UIU/ML — SIGNIFICANT CHANGE UP (ref 0.35–4.94)
UROBILINOGEN FLD QL: 1 E.U./DL — SIGNIFICANT CHANGE UP
VALPROATE SERPL-MCNC: <3 UG/ML — LOW (ref 50–100)
WBC # BLD: 6.9 K/UL — SIGNIFICANT CHANGE UP (ref 3.8–10.5)
WBC # FLD AUTO: 6.9 K/UL — SIGNIFICANT CHANGE UP (ref 3.8–10.5)

## 2017-08-07 PROCEDURE — 99285 EMERGENCY DEPT VISIT HI MDM: CPT | Mod: 25

## 2017-08-07 PROCEDURE — 93010 ELECTROCARDIOGRAM REPORT: CPT

## 2017-08-07 RX ORDER — OLANZAPINE 15 MG/1
20 TABLET, FILM COATED ORAL AT BEDTIME
Qty: 0 | Refills: 0 | Status: DISCONTINUED | OUTPATIENT
Start: 2017-08-07 | End: 2017-08-08

## 2017-08-07 RX ORDER — GABAPENTIN 400 MG/1
300 CAPSULE ORAL EVERY 4 HOURS
Qty: 0 | Refills: 0 | Status: DISCONTINUED | OUTPATIENT
Start: 2017-08-07 | End: 2017-08-15

## 2017-08-07 RX ORDER — ONDANSETRON 8 MG/1
4 TABLET, FILM COATED ORAL EVERY 12 HOURS
Qty: 0 | Refills: 0 | Status: DISCONTINUED | OUTPATIENT
Start: 2017-08-07 | End: 2017-09-05

## 2017-08-07 RX ORDER — DIPHENHYDRAMINE HCL 50 MG
50 CAPSULE ORAL AT BEDTIME
Qty: 0 | Refills: 0 | Status: DISCONTINUED | OUTPATIENT
Start: 2017-08-07 | End: 2017-09-05

## 2017-08-07 RX ORDER — VALPROIC ACID (AS SODIUM SALT) 250 MG/5ML
500 SOLUTION, ORAL ORAL
Qty: 0 | Refills: 0 | Status: DISCONTINUED | OUTPATIENT
Start: 2017-08-07 | End: 2017-08-10

## 2017-08-07 RX ORDER — IBUPROFEN 200 MG
400 TABLET ORAL EVERY 4 HOURS
Qty: 0 | Refills: 0 | Status: DISCONTINUED | OUTPATIENT
Start: 2017-08-07 | End: 2017-09-05

## 2017-08-07 RX ADMIN — Medication 50 MILLIGRAM(S): at 22:30

## 2017-08-07 RX ADMIN — Medication 500 MILLIGRAM(S): at 22:41

## 2017-08-07 RX ADMIN — OLANZAPINE 20 MILLIGRAM(S): 15 TABLET, FILM COATED ORAL at 22:30

## 2017-08-07 NOTE — ED PROVIDER NOTE - PROGRESS NOTE DETAILS
Appreciate consultation by psychiatry. Pt is tolerating PO, eating food, comfortable appearing. as per psych will admit pt, pt is medically cleared for psych

## 2017-08-07 NOTE — ED PROVIDER NOTE - MEDICAL DECISION MAKING DETAILS
51M with suicidal ideation, hx of psych d/o, vital signs wnl. Neuro exam nonfocal. Plan for psych consult after medical clearance.

## 2017-08-07 NOTE — ED BEHAVIORAL HEALTH ASSESSMENT NOTE - DETAILS
history of suicide attempt four years ago by overdose on zyprexa GI to jump in front of train 8u nursing self

## 2017-08-07 NOTE — ED ADULT TRIAGE NOTE - OTHER COMPLAINTS
pt reports SI x 1 day. denies HI. "I want to jump in front of a train" admits to taking crack cocaine yesterday. denies drug or alcohol use today. pt calm and cooperative in triage.

## 2017-08-07 NOTE — ED BEHAVIORAL HEALTH ASSESSMENT NOTE - HPI (INCLUDE ILLNESS QUALITY, SEVERITY, DURATION, TIMING, CONTEXT, MODIFYING FACTORS, ASSOCIATED SIGNS AND SYMPTOMS)
51 year old single male on SSI domiciled at shelter on [facility] with prior psychiatric history of schizoaffective disorder, multiple hospitalizations last at [facility] for similar issues one suicide attempt four years prior via overdose, no history of violence, not in outpatient treatment despite our effeorts to set him up with invega long acting in a clinic, daily cannabis use, who is bibs for SI to again jump in front of a bus or jump off a building.  He admits to complete non compliance since discharge.      Patient again with poverty of thought and is somewhat concrete. At first admits to cocaine and mj but then admits to opiates when I describe his utox  findings. He states that he knows drug use is not helping.  States that he hears voice to kill self and will act on it.  Notes all neurovegetative symptoms.  States that he came here to try to get help.   Maria Luz state who was supposed to administer his long acting (and this should be in our dc summary).         No other sources of collateral were available.

## 2017-08-07 NOTE — ED BEHAVIORAL HEALTH ASSESSMENT NOTE - DESCRIPTION (FIRST USE, LAST USE, QUANTITY, FREQUENCY, DURATION)
one pack per day has not had any alcohol recently daily 2 dime bags, first smoked age 3 from father percocets per patient

## 2017-08-07 NOTE — ED BEHAVIORAL HEALTH ASSESSMENT NOTE - SUICIDE RISK FACTORS
Substance abuse/dependence/Access to means (pills, firearms, etc.)/Mood episode/Hopelessness/Command hallucinations to hurt self

## 2017-08-07 NOTE — ED PROVIDER NOTE - OBJECTIVE STATEMENT
51M with suicidal ideation 51M with hx of schizoaffective d/o presenting with suicidal ideation. Pt is marginally housed, lives in shelter. Admission to psych in July for similar complaint. Pt has chronic pain for which he takes Gabapentin. Supposed to be taking Invega.

## 2017-08-07 NOTE — ED BEHAVIORAL HEALTH ASSESSMENT NOTE - SUMMARY
51 year old man domiciled in shelter, history of schizoaffective disorder, many PPH, substance abuse, chronic non compliance, 1 serious SA OD, with SI and AH in context of recent drug use but also continued lack of structure and medications.  Not reassuring and seems disorganized and at risk of accidental or intentional self harm.

## 2017-08-07 NOTE — ED BEHAVIORAL HEALTH ASSESSMENT NOTE - OTHER PAST PSYCHIATRIC HISTORY (INCLUDE DETAILS REGARDING ONSET, COURSE OF ILLNESS, INPATIENT/OUTPATIENT TREATMENT)
per last note: Prior diagnosis of schizoaffective versus bipolar vs major depression. Several hospitalizations per patient since 2001, last was [facility] a month ago (verified with ), has been hospitalized prior at [facility]. No outpatient providers. Prior history of suicide attempt via overdose four years ago on zyprexa. No violence.

## 2017-08-07 NOTE — ED BEHAVIORAL HEALTH ASSESSMENT NOTE - PSYCHIATRIC ISSUES AND PLAN (INCLUDE STANDING AND PRN MEDICATION)
I will restart patient on zyprexa and vpa.  Last time he presented we restarted him on : Restart zyprexa 20mg po qhs, gabapentin 300mg po TID, depakote 750mg po BID, and cymbalta 30mg po daily (his entire last known regimen), again we should look to long acting injectable given his non compliance

## 2017-08-08 PROCEDURE — 99233 SBSQ HOSP IP/OBS HIGH 50: CPT

## 2017-08-08 RX ORDER — OLANZAPINE 15 MG/1
10 TABLET, FILM COATED ORAL AT BEDTIME
Qty: 0 | Refills: 0 | Status: DISCONTINUED | OUTPATIENT
Start: 2017-08-08 | End: 2017-08-09

## 2017-08-08 RX ORDER — PALIPERIDONE 1.5 MG/1
3 TABLET, EXTENDED RELEASE ORAL DAILY
Qty: 0 | Refills: 0 | Status: DISCONTINUED | OUTPATIENT
Start: 2017-08-08 | End: 2017-08-09

## 2017-08-08 RX ADMIN — Medication 500 MILLIGRAM(S): at 17:00

## 2017-08-08 RX ADMIN — OLANZAPINE 10 MILLIGRAM(S): 15 TABLET, FILM COATED ORAL at 21:27

## 2017-08-08 RX ADMIN — Medication 500 MILLIGRAM(S): at 10:32

## 2017-08-08 RX ADMIN — PALIPERIDONE 3 MILLIGRAM(S): 1.5 TABLET, EXTENDED RELEASE ORAL at 17:00

## 2017-08-08 RX ADMIN — Medication 50 MILLIGRAM(S): at 21:27

## 2017-08-08 NOTE — BEHAVIORAL HEALTH ASSESSMENT NOTE - NSBHSUICRISKFACTOR_PSY_A_CORE
Substance abuse/dependence/Impulsivity/Command hallucinations to hurt self/Unable to engage in safety planning/Mood episode

## 2017-08-08 NOTE — BEHAVIORAL HEALTH ASSESSMENT NOTE - NSBHMEDSOTHERFT_PSY_A_CORE
Inpatient comment:  dose of Zyprexa is actually 20mg a day (not 200mg a day) and has been reduced to 10mg at night.   Invega titration started at 3mg with plan to raise and switch to SUSTENNA.  Depakote to be evaluated.

## 2017-08-08 NOTE — BEHAVIORAL HEALTH ASSESSMENT NOTE - NSBHCHARTREVIEWLAB_PSY_A_CORE FT
urine tox positive for THC cocaine and opates; urine analysis shows wbcs should be repeated to r/o UTI.  TG 75; Vitamin D is low.  VPA is <3 was 67 on 6/9.

## 2017-08-08 NOTE — BEHAVIORAL HEALTH ASSESSMENT NOTE - NSBHCHARTREVIEWVS_PSY_A_CORE FT
Vital Signs Last 24 Hrs  T(C): 36.9 (08 Aug 2017 15:45), Max: 37.2 (07 Aug 2017 21:45)  T(F): 98.5 (08 Aug 2017 15:45), Max: 98.9 (07 Aug 2017 21:45)  HR: 73 (08 Aug 2017 15:45) (73 - 82)  BP: 125/84 (08 Aug 2017 15:45) (97/62 - 125/84)  BP(mean): --  RR: 18 (08 Aug 2017 15:45) (16 - 18)  SpO2: 98% (07 Aug 2017 21:45) (98% - 100%)

## 2017-08-08 NOTE — BEHAVIORAL HEALTH ASSESSMENT NOTE - SUMMARY
51 year old man domiciled in shelter, history of schizoaffective disorder, many PPH, substance abuse, chronic non compliance, 1 serious SA OD, with SI and AH in context of recent drug use but also continued lack of structure and medications.  Not reassuring and seems disorganized and at risk of accidental or intentional self harm.    ;8/8: seen lying in bed making minimal eye contact; constricted; does attempt to answer cognitive questions and demonstrates registration of 3/3 recall of 2/3;  fund of knowledge intact; speech soft and not spontaneous; guarded impoverished thinking but no peculiarities; AH command harm;  states he overdosed a year ago.  no physical complaints.

## 2017-08-08 NOTE — BEHAVIORAL HEALTH ASSESSMENT NOTE - NSBHADMITTHERAPIESTARGET_PSY_A_CORE FT
substance use and non-compliance with treatment.  ellucidate mechanism of treatment avoidance.  Need to assess need for NRT.

## 2017-08-08 NOTE — BEHAVIORAL HEALTH ASSESSMENT NOTE - HPI (INCLUDE ILLNESS QUALITY, SEVERITY, DURATION, TIMING, CONTEXT, MODIFYING FACTORS, ASSOCIATED SIGNS AND SYMPTOMS)
51 year old single male on SSI [facility] with prior psychiatric history of schizoaffective disorder, multiple hospitalizations last at [facility] for similar issues one suicide attempt four years prior via overdose, no history of violence, not in outpatient treatment despite our effeorts to set him up with invega long acting in a clinic, daily cannabis use, who is bibs for SI to again jump in front of a bus or jump off a building.  He admits to complete non compliance since discharge.      Patient again with poverty of thought and is somewhat concrete. At first admits to cocaine and mj but then admits to opiates when I describe his utox  findings. He states that he knows drug use is not helping.  States that he hears voice to kill self and will act on it.  Notes all neurovegetative symptoms.  States that he came here to try to get help.   Maria Luz state who was supposed to administer his long acting (and this should be in our dc summary).         No other sources of collateral were available.

## 2017-08-09 DIAGNOSIS — F19.10 OTHER PSYCHOACTIVE SUBSTANCE ABUSE, UNCOMPLICATED: ICD-10-CM

## 2017-08-09 DIAGNOSIS — F25.0 SCHIZOAFFECTIVE DISORDER, BIPOLAR TYPE: ICD-10-CM

## 2017-08-09 PROCEDURE — 93010 ELECTROCARDIOGRAM REPORT: CPT

## 2017-08-09 RX ORDER — OLANZAPINE 15 MG/1
5 TABLET, FILM COATED ORAL AT BEDTIME
Qty: 0 | Refills: 0 | Status: DISCONTINUED | OUTPATIENT
Start: 2017-08-09 | End: 2017-08-10

## 2017-08-09 RX ORDER — PALIPERIDONE 1.5 MG/1
6 TABLET, EXTENDED RELEASE ORAL DAILY
Qty: 0 | Refills: 0 | Status: DISCONTINUED | OUTPATIENT
Start: 2017-08-09 | End: 2017-08-15

## 2017-08-09 RX ADMIN — Medication 500 MILLIGRAM(S): at 07:33

## 2017-08-09 RX ADMIN — PALIPERIDONE 6 MILLIGRAM(S): 1.5 TABLET, EXTENDED RELEASE ORAL at 19:13

## 2017-08-09 RX ADMIN — PALIPERIDONE 3 MILLIGRAM(S): 1.5 TABLET, EXTENDED RELEASE ORAL at 10:34

## 2017-08-09 RX ADMIN — OLANZAPINE 5 MILLIGRAM(S): 15 TABLET, FILM COATED ORAL at 21:37

## 2017-08-09 RX ADMIN — Medication 50 MILLIGRAM(S): at 21:37

## 2017-08-09 RX ADMIN — Medication 500 MILLIGRAM(S): at 19:13

## 2017-08-09 NOTE — PROGRESS NOTE BEHAVIORAL HEALTH - SUMMARY
51 year old man domiciled in shelter, history of schizoaffective disorder, many PPH, substance abuse, chronic non compliance, 1 serious SA OD, with SI and AH in context of recent drug use but also continued lack of structure and medications.  Not reassuring and seems disorganized and at risk of accidental or intentional self harm.    ;8/8: seen lying in bed making minimal eye contact; constricted; does attempt to answer cognitive questions and demonstrates registration of 3/3 recall of 2/3;  fund of knowledge intact; speech soft and not spontaneous; guarded impoverished thinking but no peculiarities; AH command harm;  states he overdosed a year ago.  no physical complaints.    8/9: 51 year old man domiciled in shelter, history of schizoaffective disorder, many PPH, substance abuse, chronic non compliance, 1 serious SA OD, with SI and AH in context of recent drug use but also continued lack of structure and medications.  Not reassuring and seems disorganized and at risk of accidental or intentional self harm.    ;8/8: seen lying in bed making minimal eye contact; constricted; does attempt to answer cognitive questions and demonstrates registration of 3/3 recall of 2/3;  fund of knowledge intact; speech soft and not spontaneous; guarded impoverished thinking but no peculiarities; AH command harm;  states he overdosed a year ago.  no physical complaints.    8/9: pt refusing interview; continues to endorse to nursing staff plan to jump in front of bus

## 2017-08-09 NOTE — PROGRESS NOTE BEHAVIORAL HEALTH - PROBLEM SELECTOR PLAN 1
Invega titration continuing 3mg with plan to raise and switch to SUSTENNA.    Continue Zyprexa 10 mg po qhs.   Continue Depakote 500 mg po BID.  I/G/T therapy Increase Invega to 6mg with plan to raise and switch to SUSTENNA.    Cross-titrating Zyprexa  down to 5 mg po qhs.   Continue Depakote 500 mg po BID.  I/G/T therapy

## 2017-08-09 NOTE — PROGRESS NOTE BEHAVIORAL HEALTH - NSBHFUPINTERVALHXFT_PSY_A_CORE
Pt refusing interview this morning. States "I'm tired. I don't feel like talking". Pt nods head when queried regarding suicidal ideation and plan. Per nursing, guarded and continues to endorse plan to throw himself in front of bus. Compliant w/ meds. Pt refusing interview this morning. States "I'm tired. I don't feel like talking". Pt nods head when queried regarding suicidal ideation and plan. Per nursing, guarded and continues to endorse plan to throw himself in front of bus. Compliant w/ meds.    Pt's previous records at [facility] reviewed: Pt's last admission w/ almost identical presentation -- pt had complained of worsening depression and command auditory hallucinations (to jump in front of bus) in context of medication non-compliance. Per records, pt has multiple hospitalizations at [facility] (last March 25 to May 5 of this year) also with very similar presentation. He was in outpt treatment there 2004-08.    Pt received 2 doses ZAMORA Paliperidone on last admission to [facility] 8 Uris: 234 mg on 06/27 and 156 mg on 07/04. Tolerated without complication. Discharged on Depakote and Neurontin but did not comply.

## 2017-08-09 NOTE — PROGRESS NOTE BEHAVIORAL HEALTH - NSBHCHARTREVIEWLAB_PSY_A_CORE FT
Utox on admission positive for cocaine, thc, opiates  Serum Lithium, Carbamezipine, and VPA below therapeutic levels     CBC Full  -  ( 07 Aug 2017 19:20 )  WBC Count : 6.9 K/uL  Hemoglobin : 12.7 g/dL  Hematocrit : 37.8 %  Platelet Count - Automated : 289 K/uL  Mean Cell Volume : 85.9 fL  Mean Cell Hemoglobin : 28.9 pg  Mean Cell Hemoglobin Concentration : 33.6 g/dL  Auto Neutrophil # : x  Auto Lymphocyte # : x  Auto Monocyte # : x  Auto Eosinophil # : x  Auto Basophil # : x  Auto Neutrophil % : 65.3 %  Auto Lymphocyte % : 19.7 %  Auto Monocyte % : 10.5 %  Auto Eosinophil % : 4.2 %  Auto Basophil % : 0.3 %    08-07    142  |  100  |  18  ----------------------------<  115<H>  4.0   |  26  |  1.00    Ca    9.7      07 Aug 2017 19:20    TPro  8.1  /  Alb  4.4  /  TBili  0.4  /  DBili  x   /  AST  21  /  ALT  13  /  AlkPhos  86  08-07 Utox on admission positive for cocaine, thc, opiates  Serum Lithium, Carbamezipine, and VPA below therapeutic levels     CBC Full  -  ( 07 Aug 2017 19:20 )  WBC Count : 6.9 K/uL  Hemoglobin : 12.7 g/dL  Hematocrit : 37.8 %  Platelet Count - Automated : 289 K/uL  Mean Cell Volume : 85.9 fL  Mean Cell Hemoglobin : 28.9 pg  Mean Cell Hemoglobin Concentration : 33.6 g/dL  Auto Neutrophil # : x  Auto Lymphocyte # : x  Auto Monocyte # : x  Auto Eosinophil # : x  Auto Basophil # : x  Auto Neutrophil % : 65.3 %  Auto Lymphocyte % : 19.7 %  Auto Monocyte % : 10.5 %  Auto Eosinophil % : 4.2 %  Auto Basophil % : 0.3 %    08-07    142  |  100  |  18  ----------------------------<  115<H>  4.0   |  26  |  1.00    Ca    9.7      07 Aug 2017 19:20    TPro  8.1  /  Alb  4.4  /  TBili  0.4  /  DBili  x   /  AST  21  /  ALT  13  /  AlkPhos  86  08-07      From previous admission,  06/09/17  HbA1c 5.3  Lipid panel: Total cholesterol 208,

## 2017-08-09 NOTE — PROGRESS NOTE BEHAVIORAL HEALTH - CASE SUMMARY
see above  ;; 8/9: stays in bed; says little to writer;  may need to slow Invega titration in view of sedation and vague complaints.  monitor vs and for physical complaints. and may need repeat labs.

## 2017-08-10 RX ORDER — NICOTINE POLACRILEX 2 MG
1 GUM BUCCAL DAILY
Qty: 0 | Refills: 0 | Status: DISCONTINUED | OUTPATIENT
Start: 2017-08-10 | End: 2017-09-05

## 2017-08-10 RX ADMIN — PALIPERIDONE 6 MILLIGRAM(S): 1.5 TABLET, EXTENDED RELEASE ORAL at 12:40

## 2017-08-10 RX ADMIN — Medication 50 MILLIGRAM(S): at 21:02

## 2017-08-10 RX ADMIN — Medication 500 MILLIGRAM(S): at 07:45

## 2017-08-10 NOTE — PROGRESS NOTE BEHAVIORAL HEALTH - SUMMARY
51 year old man domiciled in shelter, history of schizoaffective disorder, many PPH, substance abuse, chronic non compliance, 1 serious SA OD, with SI and AH in context of recent drug use but also continued lack of structure and medications.  Not reassuring and seems disorganized and at risk of accidental or intentional self harm.    ;8/8: seen lying in bed making minimal eye contact; constricted; does attempt to answer cognitive questions and demonstrates registration of 3/3 recall of 2/3;  fund of knowledge intact; speech soft and not spontaneous; guarded impoverished thinking but no peculiarities; AH command harm;  states he overdosed a year ago.  no physical complaints.    8/9: pt refusing interview; continues to endorse to nursing staff plan to jump in front of bus    8/10: pt in bed most of day; uncooperative; reports withdrawing from heroin w/ complaint of back ache, runny nose and requesting methadone--COWS score 8 suggests only mild withdrawal; becomes somewhat hostile when not offered methadone; continues to endorse AH to jump in front of train; depakote discontinued due to pt's hx of non-compliance and possible effects compounding pt's sedation; Zyprexa discontinued (completion of cross-taper); will continue Invega 6 mg w/ plan for ZAMORA on discharge

## 2017-08-10 NOTE — PROGRESS NOTE BEHAVIORAL HEALTH - NSBHFUPINTERVALHXFT_PSY_A_CORE
Pt seen and evaluated at bedside this morning following breakfast. Reports that he is withdrawing from recent heroin use. States he was using 5 bags a day up until time of admission. Currently reports back ache, runny nose, fatigue, and poor sleep. Is requesting Methadone. Pt continues to report hearing voices which instruct him to Pt seen and evaluated at bedside this morning following breakfast. Reports that he is withdrawing from recent heroin use. States he was using 5 bags a day up until time of admission. Currently reports back ache, runny nose, fatigue, and poor sleep. Pt denies nausea, diarrhea, chills. Is requesting Methadone.  Pt becomes uncooperative and somewhat hostile when Methadone is not offered. Pt continues to report hearing voices which instruct him to jump in front of bus. Requests antidepressant, states he was previously on Cymbalta but did not maintain while on streets. Per nursing, isolative, guarded, in bed throughout day. Not attending groups, including AA. Received PRN Benadryl.    COWS score 8 suggests mild opiate withdrawal.

## 2017-08-10 NOTE — PROGRESS NOTE BEHAVIORAL HEALTH - MODIFICATIONS
d/c'd zyprexa; d/c'd VPA ; monitor u/a u/c; COWS does not support use of Methadone at this time; patient mostly sedated ; no piloerection; no diarrhea or GI sxs.

## 2017-08-10 NOTE — PROGRESS NOTE BEHAVIORAL HEALTH - NSBHCHARTREVIEWLAB_PSY_A_CORE FT
Utox on admission positive for cocaine, thc, opiates  Serum Lithium, Carbamezipine, and VPA below therapeutic levels     CBC Full  -  ( 07 Aug 2017 19:20 )  WBC Count : 6.9 K/uL  Hemoglobin : 12.7 g/dL  Hematocrit : 37.8 %  Platelet Count - Automated : 289 K/uL  Mean Cell Volume : 85.9 fL  Mean Cell Hemoglobin : 28.9 pg  Mean Cell Hemoglobin Concentration : 33.6 g/dL  Auto Neutrophil # : x  Auto Lymphocyte # : x  Auto Monocyte # : x  Auto Eosinophil # : x  Auto Basophil # : x  Auto Neutrophil % : 65.3 %  Auto Lymphocyte % : 19.7 %  Auto Monocyte % : 10.5 %  Auto Eosinophil % : 4.2 %  Auto Basophil % : 0.3 %    08-07    142  |  100  |  18  ----------------------------<  115<H>  4.0   |  26  |  1.00    Ca    9.7      07 Aug 2017 19:20    TPro  8.1  /  Alb  4.4  /  TBili  0.4  /  DBili  x   /  AST  21  /  ALT  13  /  AlkPhos  86  08-07      From previous admission,  06/09/17  HbA1c 5.3  Lipid panel: Total cholesterol 208,

## 2017-08-10 NOTE — PROGRESS NOTE BEHAVIORAL HEALTH - CASE SUMMARY
see above;  ;; 8/10: patient mostly stays in bed and talks about wanting methadone ; he interprets his sxs as opioid related but not substantiated at this time.  using comfort drugs as needed e.g. Zofram.

## 2017-08-10 NOTE — PROGRESS NOTE BEHAVIORAL HEALTH - PROBLEM SELECTOR PLAN 1
Continue Invega 6mg with plan to raise and switch to SUSTENNA.    Zyprexa discontinued (completion of cross-taper)   Depakote 500 mg po BID discontinued  CBC, ammonia level ordered to monitor pt's platelet count and for possible hyperammonemia d/t Depakote  I/G/T therapy Continue Invega 6mg with plan to raise and switch to SUSTENNA.    Zyprexa discontinued (completion of cross-taper)   Depakote 500 mg po BID discontinued  ammonia level ordered to check for possible hyperammonemia d/t Depakote  I/G/T therapy

## 2017-08-11 LAB
APPEARANCE UR: CLEAR — SIGNIFICANT CHANGE UP
BILIRUB UR-MCNC: NEGATIVE — SIGNIFICANT CHANGE UP
COLOR SPEC: YELLOW — SIGNIFICANT CHANGE UP
DIFF PNL FLD: (no result)
GLUCOSE UR QL: NEGATIVE — SIGNIFICANT CHANGE UP
KETONES UR-MCNC: NEGATIVE — SIGNIFICANT CHANGE UP
LEUKOCYTE ESTERASE UR-ACNC: NEGATIVE — SIGNIFICANT CHANGE UP
NITRITE UR-MCNC: NEGATIVE — SIGNIFICANT CHANGE UP
PH UR: 6 — SIGNIFICANT CHANGE UP (ref 5–8)
PROT UR-MCNC: NEGATIVE MG/DL — SIGNIFICANT CHANGE UP
SP GR SPEC: 1.02 — SIGNIFICANT CHANGE UP (ref 1–1.03)
UROBILINOGEN FLD QL: 0.2 E.U./DL — SIGNIFICANT CHANGE UP

## 2017-08-11 PROCEDURE — 99222 1ST HOSP IP/OBS MODERATE 55: CPT

## 2017-08-11 RX ADMIN — Medication 1 PATCH: at 09:41

## 2017-08-11 RX ADMIN — PALIPERIDONE 6 MILLIGRAM(S): 1.5 TABLET, EXTENDED RELEASE ORAL at 09:41

## 2017-08-11 RX ADMIN — Medication 50 MILLIGRAM(S): at 21:34

## 2017-08-11 NOTE — PROGRESS NOTE BEHAVIORAL HEALTH - SUMMARY
51 year old man domiciled in shelter, history of schizoaffective disorder, many PPH, substance abuse, chronic non compliance, 1 serious SA OD, with SI and AH in context of recent drug use but also continued lack of structure and medications.  Not reassuring and seems disorganized and at risk of accidental or intentional self harm.    ;8/8: seen lying in bed making minimal eye contact; constricted; does attempt to answer cognitive questions and demonstrates registration of 3/3 recall of 2/3;  fund of knowledge intact; speech soft and not spontaneous; guarded impoverished thinking but no peculiarities; AH command harm;  states he overdosed a year ago.  no physical complaints.    8/9: pt refusing interview; continues to endorse to nursing staff plan to jump in front of bus    8/10: pt in bed most of day; uncooperative; reports withdrawing from heroin w/ complaint of back ache, runny nose and requesting methadone--COWS score 8 suggests only mild withdrawal; becomes somewhat hostile when not offered methadone; continues to endorse AH to jump in front of train; depakote discontinued due to pt's hx of non-compliance and possible effects compounding pt's sedation; Zyprexa discontinued (completion of cross-taper); will continue Invega 6 mg w/ plan for ZAMORA on discharge    8/11: Pt continues to lie in bed throughout day; not engaged with interview or attending groups; pt denies positive effect of medicine but appears slightly more alert today and less sedated.

## 2017-08-11 NOTE — PROGRESS NOTE BEHAVIORAL HEALTH - PROBLEM SELECTOR PLAN 1
Continue Invega 6mg with plan to raise and switch to SUSTENNA.    Zyprexa discontinued (completion of cross-taper)   Depakote 500 mg po BID discontinued  f./u ammonia level for possible hyperammonemia d/t Depakote  I/G/T therapy    Follow up urinalysis/urine culture.

## 2017-08-11 NOTE — PROGRESS NOTE BEHAVIORAL HEALTH - CASE SUMMARY
see above;  ;; 8/11: patient still in bed most of day; makes better eye contact; mood less constricted; complains of back discomfort; awaiting u/a u/c results to r/o UTI.  afebrile

## 2017-08-11 NOTE — PROGRESS NOTE BEHAVIORAL HEALTH - NSBHFUPINTERVALHXFT_PSY_A_CORE
Pt seen and evaluated this morning at bedside following breakfast. Observed lying in bed, shows little interest in interview, states "I don't know" to most questions. Continues to report being suicidal. States he hears voices that tell him to leave the hospital and jump in front of a train. Pt reports that he is not feeling the effect of the medicine. Not attending groups. Per nursing, isolative and in bed throughout day.

## 2017-08-12 LAB
AMMONIA BLD-MCNC: 30 UMOL/L — SIGNIFICANT CHANGE UP (ref 11–55)
APPEARANCE UR: CLEAR — SIGNIFICANT CHANGE UP
BACTERIA # UR AUTO: PRESENT /HPF
BILIRUB UR-MCNC: NEGATIVE — SIGNIFICANT CHANGE UP
COLOR SPEC: YELLOW — SIGNIFICANT CHANGE UP
COMMENT - URINE: SIGNIFICANT CHANGE UP
DIFF PNL FLD: (no result)
EPI CELLS # UR: SIGNIFICANT CHANGE UP /HPF
GLUCOSE UR QL: NEGATIVE — SIGNIFICANT CHANGE UP
KETONES UR-MCNC: NEGATIVE — SIGNIFICANT CHANGE UP
LEUKOCYTE ESTERASE UR-ACNC: NEGATIVE — SIGNIFICANT CHANGE UP
NITRITE UR-MCNC: NEGATIVE — SIGNIFICANT CHANGE UP
PH UR: 6 — SIGNIFICANT CHANGE UP (ref 5–8)
PROT UR-MCNC: NEGATIVE MG/DL — SIGNIFICANT CHANGE UP
RBC CASTS # UR COMP ASSIST: < 5 /HPF — SIGNIFICANT CHANGE UP
SP GR SPEC: 1.01 — SIGNIFICANT CHANGE UP (ref 1–1.03)
UROBILINOGEN FLD QL: 0.2 E.U./DL — SIGNIFICANT CHANGE UP
WBC UR QL: < 5 /HPF — SIGNIFICANT CHANGE UP

## 2017-08-12 RX ADMIN — Medication 1 PATCH: at 08:51

## 2017-08-12 RX ADMIN — Medication 1 PATCH: at 09:00

## 2017-08-12 RX ADMIN — PALIPERIDONE 6 MILLIGRAM(S): 1.5 TABLET, EXTENDED RELEASE ORAL at 08:51

## 2017-08-12 RX ADMIN — Medication 50 MILLIGRAM(S): at 21:20

## 2017-08-13 LAB
CULTURE RESULTS: NO GROWTH — SIGNIFICANT CHANGE UP
SPECIMEN SOURCE: SIGNIFICANT CHANGE UP

## 2017-08-13 RX ADMIN — PALIPERIDONE 6 MILLIGRAM(S): 1.5 TABLET, EXTENDED RELEASE ORAL at 08:54

## 2017-08-13 RX ADMIN — Medication 50 MILLIGRAM(S): at 21:11

## 2017-08-13 RX ADMIN — Medication 1 PATCH: at 08:55

## 2017-08-13 RX ADMIN — Medication 1 PATCH: at 08:54

## 2017-08-14 PROCEDURE — 99232 SBSQ HOSP IP/OBS MODERATE 35: CPT

## 2017-08-14 RX ORDER — QUETIAPINE FUMARATE 200 MG/1
50 TABLET, FILM COATED ORAL EVERY 6 HOURS
Qty: 0 | Refills: 0 | Status: DISCONTINUED | OUTPATIENT
Start: 2017-08-14 | End: 2017-09-05

## 2017-08-14 RX ADMIN — Medication 1 PATCH: at 10:59

## 2017-08-14 RX ADMIN — Medication 1 PATCH: at 10:23

## 2017-08-14 RX ADMIN — Medication 50 MILLIGRAM(S): at 21:30

## 2017-08-14 RX ADMIN — PALIPERIDONE 6 MILLIGRAM(S): 1.5 TABLET, EXTENDED RELEASE ORAL at 10:23

## 2017-08-14 NOTE — PROGRESS NOTE BEHAVIORAL HEALTH - CASE SUMMARY
see above  ;; 8/14: continues to endorse SI and AVH but more guarded. Seen sitting in the TV lounge watching TV; smiles strangely and inappropriately; makes a little eye contact; ADLs fair to poor.
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
## 2017-08-14 NOTE — PROGRESS NOTE BEHAVIORAL HEALTH - SUMMARY
51 year old man domiciled in shelter, history of schizoaffective disorder, many PPH, substance abuse, chronic non compliance, 1 serious SA OD, with SI and AH in context of recent drug use but also continued lack of structure and medications.  Not reassuring and seems disorganized and at risk of accidental or intentional self harm.    ;8/8: seen lying in bed making minimal eye contact; constricted; does attempt to answer cognitive questions and demonstrates registration of 3/3 recall of 2/3;  fund of knowledge intact; speech soft and not spontaneous; guarded impoverished thinking but no peculiarities; AH command harm;  states he overdosed a year ago.  no physical complaints.    8/9: pt refusing interview; continues to endorse to nursing staff plan to jump in front of bus    8/10: pt in bed most of day; uncooperative; reports withdrawing from heroin w/ complaint of back ache, runny nose and requesting methadone--COWS score 8 suggests only mild withdrawal; becomes somewhat hostile when not offered methadone; continues to endorse AH to jump in front of train; depakote discontinued due to pt's hx of non-compliance and possible effects compounding pt's sedation; Zyprexa discontinued (completion of cross-taper); will continue Invega 6 mg w/ plan for ZAMORA on discharge    8/11: Pt continues to lie in bed throughout day; not engaged with interview or attending groups; pt denies positive effect of medicine but appears slightly more alert today and less sedated. 51 year old man domiciled in shelter, history of schizoaffective disorder, many PPH, substance abuse, chronic non compliance, 1 serious SA OD, with SI and AH in context of recent drug use but also continued lack of structure and medications.  Not reassuring and seems disorganized and at risk of accidental or intentional self harm.    ;8/8: seen lying in bed making minimal eye contact; constricted; does attempt to answer cognitive questions and demonstrates registration of 3/3 recall of 2/3;  fund of knowledge intact; speech soft and not spontaneous; guarded impoverished thinking but no peculiarities; AH command harm;  states he overdosed a year ago.  no physical complaints.    8/9: pt refusing interview; continues to endorse to nursing staff plan to jump in front of bus    8/10: pt in bed most of day; uncooperative; reports withdrawing from heroin w/ complaint of back ache, runny nose and requesting methadone--COWS score 8 suggests only mild withdrawal; becomes somewhat hostile when not offered methadone; continues to endorse AH to jump in front of train; depakote discontinued due to pt's hx of non-compliance and possible effects compounding pt's sedation; Zyprexa discontinued (completion of cross-taper); will continue Invega 6 mg w/ plan for ZAMORA on discharge    8/11: Pt continues to lie in bed throughout day; not engaged with interview or attending groups; pt denies positive effect of medicine but appears slightly more alert today and less sedated.    08/14: persistent suicidal ideation w/ CAH to jump in front of train; pt appears slightly more related;

## 2017-08-14 NOTE — PROGRESS NOTE BEHAVIORAL HEALTH - PROBLEM SELECTOR PLAN 1
Continue Invega 6mg with plan to raise and switch to SUSTENNA.    Zyprexa discontinued (completion of cross-taper)   Depakote 500 mg po BID discontinued  f./u ammonia level for possible hyperammonemia d/t Depakote  I/G/T therapy    Follow up urinalysis/urine culture. Continue Invega 6mg with plan to raise and switch to SUSTENNA  ammonia level WNL  I/G/T therapy    Follow up urinalysis/urine culture.

## 2017-08-15 PROCEDURE — 99232 SBSQ HOSP IP/OBS MODERATE 35: CPT

## 2017-08-15 RX ORDER — GABAPENTIN 400 MG/1
300 CAPSULE ORAL EVERY 4 HOURS
Qty: 0 | Refills: 0 | Status: DISCONTINUED | OUTPATIENT
Start: 2017-08-15 | End: 2017-09-05

## 2017-08-15 RX ORDER — PALIPERIDONE 1.5 MG/1
9 TABLET, EXTENDED RELEASE ORAL DAILY
Qty: 0 | Refills: 0 | Status: DISCONTINUED | OUTPATIENT
Start: 2017-08-15 | End: 2017-09-01

## 2017-08-15 RX ORDER — SERTRALINE 25 MG/1
25 TABLET, FILM COATED ORAL DAILY
Qty: 0 | Refills: 0 | Status: DISCONTINUED | OUTPATIENT
Start: 2017-08-16 | End: 2017-08-16

## 2017-08-15 RX ADMIN — PALIPERIDONE 6 MILLIGRAM(S): 1.5 TABLET, EXTENDED RELEASE ORAL at 09:49

## 2017-08-15 RX ADMIN — Medication 1 PATCH: at 09:49

## 2017-08-15 RX ADMIN — Medication 50 MILLIGRAM(S): at 21:14

## 2017-08-15 RX ADMIN — GABAPENTIN 300 MILLIGRAM(S): 400 CAPSULE ORAL at 21:16

## 2017-08-15 NOTE — PROGRESS NOTE BEHAVIORAL HEALTH - NSBHFUPINTERVALHXFT_PSY_A_CORE
more interactive; makes spontaneous eye contact and smiles strangely while endorsing feeling depressed; agrees to Zoloft but prefers Cymbalta; felt Cymbalta could induce mixed mood or cycling.  fair ADLs.

## 2017-08-15 NOTE — PROGRESS NOTE BEHAVIORAL HEALTH - PROBLEM SELECTOR PLAN 1
Continue Invega 6mg with plan to raise and switch to SUSTENNA  ammonia level WNL  I/G/T therapy    Follow up urinalysis/urine culture.

## 2017-08-15 NOTE — PROGRESS NOTE BEHAVIORAL HEALTH - RISK ASSESSMENT
risk factors: substance abuse; mood disorder;  hallucinations  protective factors. somewhat verbal and willing to take medications  modifiable factors: hallucinations being addressed with Invega; mood being addressed with Invega and Zoloft and group and milieu therapy.

## 2017-08-15 NOTE — PROGRESS NOTE BEHAVIORAL HEALTH - SUMMARY
51 year old man domiciled in shelter, history of schizoaffective disorder, many PPH, substance abuse, chronic non compliance, 1 serious SA OD, with SI and AH in context of recent drug use but also continued lack of structure and medications.  Not reassuring and seems disorganized and at risk of accidental or intentional self harm.    ;8/8: seen lying in bed making minimal eye contact; constricted; does attempt to answer cognitive questions and demonstrates registration of 3/3 recall of 2/3;  fund of knowledge intact; speech soft and not spontaneous; guarded impoverished thinking but no peculiarities; AH command harm;  states he overdosed a year ago.  no physical complaints.    8/9: pt refusing interview; continues to endorse to nursing staff plan to jump in front of bus    8/10: pt in bed most of day; uncooperative; reports withdrawing from heroin w/ complaint of back ache, runny nose and requesting methadone--COWS score 8 suggests only mild withdrawal; becomes somewhat hostile when not offered methadone; continues to endorse AH to jump in front of train; depakote discontinued due to pt's hx of non-compliance and possible effects compounding pt's sedation; Zyprexa discontinued (completion of cross-taper); will continue Invega 6 mg w/ plan for ZAMORA on discharge    8/11: Pt continues to lie in bed throughout day; not engaged with interview or attending groups; pt denies positive effect of medicine but appears slightly more alert today and less sedated.    08/14: persistent suicidal ideation w/ CAH to jump in front of train; pt appears slightly more related;    8/15: interested in Neurontin for back pain and an antidepressant;  raising Invega to 9mg a day for psychosis   and starting Zoloft 25mg po daily for depression

## 2017-08-16 PROCEDURE — 99232 SBSQ HOSP IP/OBS MODERATE 35: CPT

## 2017-08-16 RX ORDER — SERTRALINE 25 MG/1
50 TABLET, FILM COATED ORAL DAILY
Qty: 0 | Refills: 0 | Status: DISCONTINUED | OUTPATIENT
Start: 2017-08-16 | End: 2017-08-17

## 2017-08-16 RX ADMIN — Medication 1 PATCH: at 10:04

## 2017-08-16 RX ADMIN — PALIPERIDONE 9 MILLIGRAM(S): 1.5 TABLET, EXTENDED RELEASE ORAL at 10:05

## 2017-08-16 RX ADMIN — SERTRALINE 25 MILLIGRAM(S): 25 TABLET, FILM COATED ORAL at 10:05

## 2017-08-16 RX ADMIN — Medication 1 PATCH: at 10:06

## 2017-08-16 RX ADMIN — Medication 50 MILLIGRAM(S): at 21:22

## 2017-08-16 RX ADMIN — GABAPENTIN 300 MILLIGRAM(S): 400 CAPSULE ORAL at 14:31

## 2017-08-16 RX ADMIN — GABAPENTIN 300 MILLIGRAM(S): 400 CAPSULE ORAL at 21:22

## 2017-08-16 NOTE — PROGRESS NOTE BEHAVIORAL HEALTH - NSBHFUPINTERVALHXFT_PSY_A_CORE
Pt interviewed this morning in common area following breakfast. Continues to hear voices which tell him to leave hospital and jump in front of traing; however patient states that the medicine is making him "happier". Pt agrees with plan to increase Zoloft to 50 mg po and understands that that he has a Neurontin PRN for neuropathic pain/anxiety he can request. Pt is attending groups and per nursing, brighter. Per arts therapy, pt was sharing more openly in group regarding his many years struggle with sobriety and his lack of social support. Pt interviewed this morning in common area following breakfast. Continues to hear voices which tell him to leave hospital and jump in front of train; however patient states that the medicine is making him "happier". Pt agrees with plan to increase Zoloft to 50 mg po and understands that  he has a Neurontin PRN for neuropathic pain/anxiety he can request. Pt is attending groups and per nursing, brighter. Per arts therapy, pt was sharing more openly in group regarding his many years struggle with sobriety and his lack of social support.

## 2017-08-16 NOTE — PROGRESS NOTE BEHAVIORAL HEALTH - MODIFICATIONS
agree with plan to raise Invega to 9mg and would also increase Zoloft to 50mg daily.  Patient is showing signs of improvement

## 2017-08-16 NOTE — PROGRESS NOTE BEHAVIORAL HEALTH - PROBLEM SELECTOR PLAN 1
Continue Invega 9 mg (increased from 6 mg on 8/15) with plan to raise and switch to SUSTENNA  ammonia level WNL  I/G/T therapy    urinalysis/urine culture results unremarkable

## 2017-08-16 NOTE — PROGRESS NOTE BEHAVIORAL HEALTH - DETAILS
back pain wants neurontin. pt reports plan to jump in front of train persistent back pain- aware of neurontin prn

## 2017-08-16 NOTE — PROGRESS NOTE BEHAVIORAL HEALTH - RISK ASSESSMENT
risk factors: substance abuse; mood disorder;  hallucinations  protective factors. somewhat verbal and willing to take medications  modifiable factors: hallucinations being addressed with Invega; mood being addressed with Invega and Zoloft and group and milieu therapy. fixed risk factors: demographic (male, older); homeless; unemployed; long hx of substance abuse; hx of mood disorder; hx of command hallucinations  protective factors. somewhat verbal and willing to take medications  modifiable factors: hallucinations being addressed with Invega; mood being addressed with Invega and Zoloft and group and milieu therapy.    Pt is at chronic heightened risk due to long history of substance abuse and mood disorder, unemployment, homelessness; however pt is showing some moderate improvement with inpatient supervision and medication management. Continues to hear command AH to jump in front of train and requires continuing hospitalization for stabilization of acute risk.

## 2017-08-16 NOTE — PROGRESS NOTE BEHAVIORAL HEALTH - CASE SUMMARY
see above  ;; 8/16: patient smiles a  little; has begun attending groups ; no longer complians of  or SI;  anxious and "shy";  but makes good eye contact; pain managed with Neurontin from old [facility].

## 2017-08-16 NOTE — PROGRESS NOTE BEHAVIORAL HEALTH - SUMMARY
51 year old man domiciled in shelter, history of schizoaffective disorder, many PPH, substance abuse, chronic non compliance, 1 serious SA OD, with SI and AH in context of recent drug use but also continued lack of structure and medications.  Not reassuring and seems disorganized and at risk of accidental or intentional self harm.    ;8/8: seen lying in bed making minimal eye contact; constricted; does attempt to answer cognitive questions and demonstrates registration of 3/3 recall of 2/3;  fund of knowledge intact; speech soft and not spontaneous; guarded impoverished thinking but no peculiarities; AH command harm;  states he overdosed a year ago.  no physical complaints.    8/9: pt refusing interview; continues to endorse to nursing staff plan to jump in front of bus    8/10: pt in bed most of day; uncooperative; reports withdrawing from heroin w/ complaint of back ache, runny nose and requesting methadone--COWS score 8 suggests only mild withdrawal; becomes somewhat hostile when not offered methadone; continues to endorse AH to jump in front of train; depakote discontinued due to pt's hx of non-compliance and possible effects compounding pt's sedation; Zyprexa discontinued (completion of cross-taper); will continue Invega 6 mg w/ plan for ZAMORA on discharge    8/11: Pt continues to lie in bed throughout day; not engaged with interview or attending groups; pt denies positive effect of medicine but appears slightly more alert today and less sedated.    08/14: persistent suicidal ideation w/ CAH to jump in front of train; pt appears slightly more related;    8/15: interested in Neurontin for back pain and an antidepressant;  raising Invega to 9mg a day for psychosis   and starting Zoloft 25mg po daily for depression    8/16: tolerating medications. reporting "happier mood but continued AH to jump in front of train. Increasing Zoloft to 50 mg for depression and continuing Invega 9 mg w/ plan for Sustenna prior to discharge. pt is aware of Neurontin prn for back pain.

## 2017-08-17 PROCEDURE — 99231 SBSQ HOSP IP/OBS SF/LOW 25: CPT

## 2017-08-17 RX ORDER — SERTRALINE 25 MG/1
100 TABLET, FILM COATED ORAL DAILY
Qty: 0 | Refills: 0 | Status: DISCONTINUED | OUTPATIENT
Start: 2017-08-17 | End: 2017-09-05

## 2017-08-17 RX ADMIN — Medication 1 PATCH: at 09:38

## 2017-08-17 RX ADMIN — GABAPENTIN 300 MILLIGRAM(S): 400 CAPSULE ORAL at 09:38

## 2017-08-17 RX ADMIN — Medication 1 PATCH: at 09:40

## 2017-08-17 RX ADMIN — SERTRALINE 50 MILLIGRAM(S): 25 TABLET, FILM COATED ORAL at 09:38

## 2017-08-17 RX ADMIN — GABAPENTIN 300 MILLIGRAM(S): 400 CAPSULE ORAL at 22:47

## 2017-08-17 RX ADMIN — Medication 50 MILLIGRAM(S): at 22:47

## 2017-08-17 RX ADMIN — PALIPERIDONE 9 MILLIGRAM(S): 1.5 TABLET, EXTENDED RELEASE ORAL at 09:38

## 2017-08-17 NOTE — PROGRESS NOTE BEHAVIORAL HEALTH - PROBLEM SELECTOR PLAN 1
Continue Invega 9 mg (increased from 6 mg on 8/15) with plan to raise and switch to SUSTENNA  Increase Zoloft to 100 mg po to address depressive symptoms  ammonia level WNL  I/G/T therapy    urinalysis/urine culture results unremarkable

## 2017-08-17 NOTE — PROGRESS NOTE BEHAVIORAL HEALTH - CASE SUMMARY
see above  ;; 8.17: continues to be found watching TV during the day and smiling slightly; need to monitor for secondary gain when reporting sxs in view of observed improvement; at this time Zoloft dose is being optimized; encourage groups and better ADLs and address substance abuse issues.

## 2017-08-17 NOTE — PROGRESS NOTE BEHAVIORAL HEALTH - DETAILS
persistent back pain- aware of neurontin prn pt reports plan to jump in front of train improved back pain- w/ neurontin prn

## 2017-08-17 NOTE — PROGRESS NOTE BEHAVIORAL HEALTH - RISK ASSESSMENT
fixed risk factors: demographic (male, older); homeless; unemployed; long hx of substance abuse; hx of mood disorder; hx of command hallucinations  protective factors. somewhat verbal and willing to take medications  modifiable factors: hallucinations being addressed with Invega; mood being addressed with Invega and Zoloft and group and milieu therapy.    Pt is at chronic heightened risk due to long history of substance abuse and mood disorder, unemployment, homelessness; however pt is showing some moderate improvement with inpatient supervision and medication management. Continues to hear command AH to jump in front of train and requires continuing hospitalization for stabilization of acute risk.

## 2017-08-17 NOTE — PROGRESS NOTE BEHAVIORAL HEALTH - SUMMARY
51 year old man domiciled in shelter, history of schizoaffective disorder, many PPH, substance abuse, chronic non compliance, 1 serious SA OD, with SI and AH in context of recent drug use but also continued lack of structure and medications.  Not reassuring and seems disorganized and at risk of accidental or intentional self harm.    ;8/8: seen lying in bed making minimal eye contact; constricted; does attempt to answer cognitive questions and demonstrates registration of 3/3 recall of 2/3;  fund of knowledge intact; speech soft and not spontaneous; guarded impoverished thinking but no peculiarities; AH command harm;  states he overdosed a year ago.  no physical complaints.    8/9: pt refusing interview; continues to endorse to nursing staff plan to jump in front of bus    8/10: pt in bed most of day; uncooperative; reports withdrawing from heroin w/ complaint of back ache, runny nose and requesting methadone--COWS score 8 suggests only mild withdrawal; becomes somewhat hostile when not offered methadone; continues to endorse AH to jump in front of train; depakote discontinued due to pt's hx of non-compliance and possible effects compounding pt's sedation; Zyprexa discontinued (completion of cross-taper); will continue Invega 6 mg w/ plan for ZAMORA on discharge    8/11: Pt continues to lie in bed throughout day; not engaged with interview or attending groups; pt denies positive effect of medicine but appears slightly more alert today and less sedated.    08/14: persistent suicidal ideation w/ CAH to jump in front of train; pt appears slightly more related;    8/15: interested in Neurontin for back pain and an antidepressant;  raising Invega to 9mg a day for psychosis   and starting Zoloft 25mg po daily for depression    8/16: tolerating medications. reporting "happier mood but continued AH to jump in front of train. Increasing Zoloft to 50 mg for depression and continuing Invega 9 mg w/ plan for Sustenna prior to discharge. pt is aware of Neurontin prn for back pain.    08/17: given patient's positive response to Zoloft, will increase to 100 mg po. pt continuing to endorse AH to jump in front of train -- continuing Invega 9 mg and I/G/T therapy to address

## 2017-08-17 NOTE — PROGRESS NOTE BEHAVIORAL HEALTH - NSBHFUPINTERVALHXFT_PSY_A_CORE
Pt interviewed this morning at bedside following breakfast. Difficult to engage but this is patient's baseline. He reports improved mood since starting Zoloft. Continues to endorse voices directing him to leave hospital and jump in front of train. He is attending groups and per staff has been sharing more than he was at time of admission. Reports that back pain is improved with Neurontin.

## 2017-08-18 PROCEDURE — 99231 SBSQ HOSP IP/OBS SF/LOW 25: CPT

## 2017-08-18 RX ADMIN — PALIPERIDONE 9 MILLIGRAM(S): 1.5 TABLET, EXTENDED RELEASE ORAL at 09:40

## 2017-08-18 RX ADMIN — Medication 50 MILLIGRAM(S): at 22:10

## 2017-08-18 RX ADMIN — SERTRALINE 100 MILLIGRAM(S): 25 TABLET, FILM COATED ORAL at 09:38

## 2017-08-18 RX ADMIN — GABAPENTIN 300 MILLIGRAM(S): 400 CAPSULE ORAL at 09:38

## 2017-08-18 RX ADMIN — Medication 1 PATCH: at 09:38

## 2017-08-18 RX ADMIN — GABAPENTIN 300 MILLIGRAM(S): 400 CAPSULE ORAL at 22:11

## 2017-08-18 NOTE — PROGRESS NOTE BEHAVIORAL HEALTH - SUMMARY
51 year old man domiciled in shelter, history of schizoaffective disorder, many PPH, substance abuse, chronic non compliance, 1 serious SA OD, with SI and AH in context of recent drug use but also continued lack of structure and medications.  Not reassuring and seems disorganized and at risk of accidental or intentional self harm.    ;8/8: seen lying in bed making minimal eye contact; constricted; does attempt to answer cognitive questions and demonstrates registration of 3/3 recall of 2/3;  fund of knowledge intact; speech soft and not spontaneous; guarded impoverished thinking but no peculiarities; AH command harm;  states he overdosed a year ago.  no physical complaints.    8/9: pt refusing interview; continues to endorse to nursing staff plan to jump in front of bus    8/10: pt in bed most of day; uncooperative; reports withdrawing from heroin w/ complaint of back ache, runny nose and requesting methadone--COWS score 8 suggests only mild withdrawal; becomes somewhat hostile when not offered methadone; continues to endorse AH to jump in front of train; depakote discontinued due to pt's hx of non-compliance and possible effects compounding pt's sedation; Zyprexa discontinued (completion of cross-taper); will continue Invega 6 mg w/ plan for ZAMORA on discharge    8/11: Pt continues to lie in bed throughout day; not engaged with interview or attending groups; pt denies positive effect of medicine but appears slightly more alert today and less sedated.    08/14: persistent suicidal ideation w/ CAH to jump in front of train; pt appears slightly more related;    8/15: interested in Neurontin for back pain and an antidepressant;  raising Invega to 9mg a day for psychosis   and starting Zoloft 25mg po daily for depression    8/16: tolerating medications. reporting "happier mood but continued AH to jump in front of train. Increasing Zoloft to 50 mg for depression and continuing Invega 9 mg w/ plan for Sustenna prior to discharge. pt is aware of Neurontin prn for back pain.    08/17: given patient's positive response to Zoloft, will increase to 100 mg po. pt continuing to endorse AH to jump in front of train -- continuing Invega 9 mg and I/G/T therapy to address    08/18: pt showing improvement with current medications but persistent AH w/ SI; attributes improved mood to zoloft, which was increased to 100 mg yesterday. will continue medication regimen and continue to evaluate pt's potential for outpatient mgmt.

## 2017-08-18 NOTE — PROGRESS NOTE BEHAVIORAL HEALTH - NSBHFUPINTERVALHXFT_PSY_A_CORE
Pt seen and evaluated this morning following breakfast. Pt is cooperative with interview, appears somewhat brighter, still avoiding eye contact during interview.  Pt states that he is feeling better since starting Zoloft. Still endorses AH to leave hospital and jump in front of bus; however pt is able to discuss with interviewer today details of his past substance use ("dope and coke for 25 years") which he had previously avoided. When asked what he looks forward to, he states "another day on this planet". Pt does not believe he can stay substance free once discharged. Supportive therapy provided.   He is attending some groups. Per nursing, depressed and isolative but showing some improvement.

## 2017-08-18 NOTE — PROGRESS NOTE BEHAVIORAL HEALTH - CASE SUMMARY
see above  ;; 8/18: sitting in the TV lounge; alost smiles at times; fair ADLs; attends some groups;

## 2017-08-19 RX ADMIN — GABAPENTIN 300 MILLIGRAM(S): 400 CAPSULE ORAL at 19:26

## 2017-08-19 RX ADMIN — Medication 1 PATCH: at 10:07

## 2017-08-19 RX ADMIN — Medication 1 PATCH: at 14:38

## 2017-08-19 RX ADMIN — SERTRALINE 100 MILLIGRAM(S): 25 TABLET, FILM COATED ORAL at 10:08

## 2017-08-19 RX ADMIN — Medication 1 PATCH: at 19:54

## 2017-08-19 RX ADMIN — Medication 50 MILLIGRAM(S): at 21:21

## 2017-08-19 RX ADMIN — GABAPENTIN 300 MILLIGRAM(S): 400 CAPSULE ORAL at 10:08

## 2017-08-19 RX ADMIN — PALIPERIDONE 9 MILLIGRAM(S): 1.5 TABLET, EXTENDED RELEASE ORAL at 10:08

## 2017-08-20 RX ADMIN — Medication 1 PATCH: at 10:13

## 2017-08-20 RX ADMIN — SERTRALINE 100 MILLIGRAM(S): 25 TABLET, FILM COATED ORAL at 10:11

## 2017-08-20 RX ADMIN — Medication 50 MILLIGRAM(S): at 21:47

## 2017-08-20 RX ADMIN — Medication 1 PATCH: at 10:10

## 2017-08-20 RX ADMIN — GABAPENTIN 300 MILLIGRAM(S): 400 CAPSULE ORAL at 10:10

## 2017-08-20 RX ADMIN — PALIPERIDONE 9 MILLIGRAM(S): 1.5 TABLET, EXTENDED RELEASE ORAL at 10:11

## 2017-08-20 RX ADMIN — GABAPENTIN 300 MILLIGRAM(S): 400 CAPSULE ORAL at 21:47

## 2017-08-21 PROCEDURE — 99231 SBSQ HOSP IP/OBS SF/LOW 25: CPT

## 2017-08-21 RX ADMIN — Medication 1 PATCH: at 10:19

## 2017-08-21 RX ADMIN — GABAPENTIN 300 MILLIGRAM(S): 400 CAPSULE ORAL at 10:19

## 2017-08-21 RX ADMIN — GABAPENTIN 300 MILLIGRAM(S): 400 CAPSULE ORAL at 21:51

## 2017-08-21 RX ADMIN — Medication 1 PATCH: at 10:23

## 2017-08-21 RX ADMIN — Medication 50 MILLIGRAM(S): at 21:51

## 2017-08-21 RX ADMIN — SERTRALINE 100 MILLIGRAM(S): 25 TABLET, FILM COATED ORAL at 10:19

## 2017-08-21 RX ADMIN — PALIPERIDONE 9 MILLIGRAM(S): 1.5 TABLET, EXTENDED RELEASE ORAL at 10:19

## 2017-08-21 NOTE — PROGRESS NOTE BEHAVIORAL HEALTH - PROBLEM SELECTOR PLAN 1
Continue Invega 9 mg (increased from 6 mg on 8/15) with plan to raise and switch to SUSTENNA  Continue Zoloft 100 mg po to address depressive symptoms  ammonia level WNL  I/G/T therapy  urinalysis/urine culture results unremarkable

## 2017-08-21 NOTE — PROGRESS NOTE BEHAVIORAL HEALTH - SUMMARY
51 year old man domiciled in shelter, history of schizoaffective disorder, many PPH, substance abuse, chronic non compliance, 1 serious SA OD, with SI and AH in context of recent drug use but also continued lack of structure and medications.  Not reassuring and seems disorganized and at risk of accidental or intentional self harm.    ;8/8: seen lying in bed making minimal eye contact; constricted; does attempt to answer cognitive questions and demonstrates registration of 3/3 recall of 2/3;  fund of knowledge intact; speech soft and not spontaneous; guarded impoverished thinking but no peculiarities; AH command harm;  states he overdosed a year ago.  no physical complaints.    8/9: pt refusing interview; continues to endorse to nursing staff plan to jump in front of bus    8/10: pt in bed most of day; uncooperative; reports withdrawing from heroin w/ complaint of back ache, runny nose and requesting methadone--COWS score 8 suggests only mild withdrawal; becomes somewhat hostile when not offered methadone; continues to endorse AH to jump in front of train; depakote discontinued due to pt's hx of non-compliance and possible effects compounding pt's sedation; Zyprexa discontinued (completion of cross-taper); will continue Invega 6 mg w/ plan for ZAMORA on discharge    8/11: Pt continues to lie in bed throughout day; not engaged with interview or attending groups; pt denies positive effect of medicine but appears slightly more alert today and less sedated.    08/14: persistent suicidal ideation w/ CAH to jump in front of train; pt appears slightly more related;    8/15: interested in Neurontin for back pain and an antidepressant;  raising Invega to 9mg a day for psychosis   and starting Zoloft 25mg po daily for depression    8/16: tolerating medications. reporting "happier mood but continued AH to jump in front of train. Increasing Zoloft to 50 mg for depression and continuing Invega 9 mg w/ plan for Sustenna prior to discharge. pt is aware of Neurontin prn for back pain.    08/17: given patient's positive response to Zoloft, will increase to 100 mg po. pt continuing to endorse AH to jump in front of train -- continuing Invega 9 mg and I/G/T therapy to address    08/18: pt showing improvement with current medications but persistent AH w/ SI; attributes improved mood to zoloft, which was increased to 100 mg yesterday. will continue medication regimen and continue to evaluate pt's potential for outpatient mgmt.    08/21: per nursing, pt more engaging and visible over weekend. mood appears to be responding positively to medical regimen. persistent auditory hallucinations are beginning to lessen.

## 2017-08-21 NOTE — PROGRESS NOTE BEHAVIORAL HEALTH - NSBHFUPINTERVALHXFT_PSY_A_CORE
Patient evaluated this morning following breakfast. States he stilll feels depressed but "better than before". Reports persistent AH to leave hospital and jump in front of bus but "a little less" than previously. Reports attending groups and reports good response to Zoloft and Invega. States back pain improved with PRN Neurontin. Per nursing, patient was more engaging, visible, and reactive over the weekend.

## 2017-08-21 NOTE — PROGRESS NOTE BEHAVIORAL HEALTH - MODIFICATIONS
continue current medications with transition to SUSTENNA soon; consider Lithium if suicidality continues an issue; SAFETY plan.

## 2017-08-21 NOTE — PROGRESS NOTE BEHAVIORAL HEALTH - CASE SUMMARY
see above  ;; 8/21: patient attends groups and seen smiling more spontaneously;  no spontaneous mention of AH or SI; guarded when asked; ADLs a little better; less reclusive.  staff observes improvement.

## 2017-08-22 PROCEDURE — 99231 SBSQ HOSP IP/OBS SF/LOW 25: CPT

## 2017-08-22 RX ADMIN — PALIPERIDONE 9 MILLIGRAM(S): 1.5 TABLET, EXTENDED RELEASE ORAL at 10:13

## 2017-08-22 RX ADMIN — Medication 1 PATCH: at 10:02

## 2017-08-22 RX ADMIN — SERTRALINE 100 MILLIGRAM(S): 25 TABLET, FILM COATED ORAL at 10:13

## 2017-08-22 RX ADMIN — Medication 50 MILLIGRAM(S): at 21:47

## 2017-08-22 RX ADMIN — GABAPENTIN 300 MILLIGRAM(S): 400 CAPSULE ORAL at 10:13

## 2017-08-22 RX ADMIN — GABAPENTIN 300 MILLIGRAM(S): 400 CAPSULE ORAL at 21:47

## 2017-08-22 RX ADMIN — Medication 1 PATCH: at 10:13

## 2017-08-22 NOTE — PROGRESS NOTE BEHAVIORAL HEALTH - SUMMARY
51 year old man domiciled in shelter, history of schizoaffective disorder, many PPH, substance abuse, chronic non compliance, 1 serious SA OD, with SI and AH in context of recent drug use but also continued lack of structure and medications.  Not reassuring and seems disorganized and at risk of accidental or intentional self harm.    ;8/8: seen lying in bed making minimal eye contact; constricted; does attempt to answer cognitive questions and demonstrates registration of 3/3 recall of 2/3;  fund of knowledge intact; speech soft and not spontaneous; guarded impoverished thinking but no peculiarities; AH command harm;  states he overdosed a year ago.  no physical complaints.    8/9: pt refusing interview; continues to endorse to nursing staff plan to jump in front of bus    8/10: pt in bed most of day; uncooperative; reports withdrawing from heroin w/ complaint of back ache, runny nose and requesting methadone--COWS score 8 suggests only mild withdrawal; becomes somewhat hostile when not offered methadone; continues to endorse AH to jump in front of train; depakote discontinued due to pt's hx of non-compliance and possible effects compounding pt's sedation; Zyprexa discontinued (completion of cross-taper); will continue Invega 6 mg w/ plan for ZAMORA on discharge    8/11: Pt continues to lie in bed throughout day; not engaged with interview or attending groups; pt denies positive effect of medicine but appears slightly more alert today and less sedated.    08/14: persistent suicidal ideation w/ CAH to jump in front of train; pt appears slightly more related;    8/15: interested in Neurontin for back pain and an antidepressant;  raising Invega to 9mg a day for psychosis   and starting Zoloft 25mg po daily for depression    8/16: tolerating medications. reporting "happier mood but continued AH to jump in front of train. Increasing Zoloft to 50 mg for depression and continuing Invega 9 mg w/ plan for Sustenna prior to discharge. pt is aware of Neurontin prn for back pain.    08/17: given patient's positive response to Zoloft, will increase to 100 mg po. pt continuing to endorse AH to jump in front of train -- continuing Invega 9 mg and I/G/T therapy to address    08/18: pt showing improvement with current medications but persistent AH w/ SI; attributes improved mood to zoloft, which was increased to 100 mg yesterday. will continue medication regimen and continue to evaluate pt's potential for outpatient mgmt.    08/21: per nursing, pt more engaging and visible over weekend. mood appears to be responding positively to medical regimen. persistent auditory hallucinations are beginning to lessen.    8/22: smiles strangely when talking vaguely about halluciations; insists he needs to stay longer; considering rehabilitation.

## 2017-08-22 NOTE — PROGRESS NOTE BEHAVIORAL HEALTH - NSBHFUPINTERVALHXFT_PSY_A_CORE
better group particiption; mood conitnues to improve.  Discussed at team and with patient need for rehabilitation.

## 2017-08-23 PROCEDURE — 99231 SBSQ HOSP IP/OBS SF/LOW 25: CPT

## 2017-08-23 RX ADMIN — SERTRALINE 100 MILLIGRAM(S): 25 TABLET, FILM COATED ORAL at 10:21

## 2017-08-23 RX ADMIN — PALIPERIDONE 9 MILLIGRAM(S): 1.5 TABLET, EXTENDED RELEASE ORAL at 10:21

## 2017-08-23 RX ADMIN — Medication 1 PATCH: at 10:21

## 2017-08-23 RX ADMIN — GABAPENTIN 300 MILLIGRAM(S): 400 CAPSULE ORAL at 10:21

## 2017-08-23 RX ADMIN — Medication 1 PATCH: at 10:24

## 2017-08-23 RX ADMIN — GABAPENTIN 300 MILLIGRAM(S): 400 CAPSULE ORAL at 19:33

## 2017-08-23 NOTE — PROGRESS NOTE BEHAVIORAL HEALTH - PROBLEM SELECTOR PLAN 2
Utox positive for cocaine, thc, opiates on admission.  Encourage 12 step  I/G/T therapy  - evaluating patient's suitability/eligibility for rehab facility

## 2017-08-23 NOTE — PROGRESS NOTE BEHAVIORAL HEALTH - RISK ASSESSMENT
fixed risk factors: demographic (male, older); homeless; unemployed; long hx of substance abuse; hx of mood disorder; hx of command hallucinations  protective factors. somewhat verbal and willing to take medications  modifiable factors: hallucinations being addressed with Invega; mood being addressed with Invega and Zoloft and group and milieu therapy.    Pt is at chronic heightened risk due to long history of substance abuse and mood disorder, unemployment, homelessness; however pt is showing some moderate improvement with inpatient supervision and medication management. Continues to hear command AH to jump in front of train and requires continuing hospitalization for stabilization of acute risk. fixed risk factors: demographic (male, older); homeless; unemployed; long hx of substance abuse; hx of mood disorder; hx of command hallucinations  protective factors. somewhat verbal and willing to take medications  modifiable factors: hallucinations being addressed with Invega; mood being addressed with Invega and Zoloft and group and milieu therapy.    Pt is at chronic heightened risk due to long history of substance abuse and mood disorder, unemployment, homelessness; however pt is showing  improvement with inpatient supervision and medication management. Continues to report hearing command AH to jump in front of train, however, given euthymic affect, no evidence of responding to internal stimuli and appropriate socialization on the unit, without evidence of neurovegetative factors, patient's risk secondary to CAH is low.  Must consider that pt is feigning sx for secondary gain.

## 2017-08-23 NOTE — PROGRESS NOTE BEHAVIORAL HEALTH - NSBHADMITDANGERSELF_PSY_A_CORE
wants to jump in front of a truck/suicidal ideation with plan and means
suicidal ideation with plan and means/wants to jump in front of a truck
wants to jump in front of a truck/suicidal ideation with plan and means
suicidal ideation with plan and means/wants to jump in front of a truck
wants to jump in front of a truck/suicidal ideation with plan and means
suicidal ideation with plan and means/wants to jump in front of a truck

## 2017-08-23 NOTE — PROGRESS NOTE BEHAVIORAL HEALTH - NSBHFUPINTERVALHXFT_PSY_A_CORE
Patient evaluated this morning following breakfast, observed sleeping in bed, resting comfortably.   On interview, patient continues to report AH to leave hospital and jump in front of train; however, patient says so with a smirk on his face. Patient reports that mood is "a little better". Tolerating medications without side effects. Back pain controlled well with Neurontin.

## 2017-08-23 NOTE — PROGRESS NOTE BEHAVIORAL HEALTH - SUMMARY
51 year old man domiciled in shelter, history of schizoaffective disorder, many PPH, substance abuse, chronic non compliance, 1 serious SA OD, with SI and AH in context of recent drug use but also continued lack of structure and medications.  Not reassuring and seems disorganized and at risk of accidental or intentional self harm.    ;8/8: seen lying in bed making minimal eye contact; constricted; does attempt to answer cognitive questions and demonstrates registration of 3/3 recall of 2/3;  fund of knowledge intact; speech soft and not spontaneous; guarded impoverished thinking but no peculiarities; AH command harm;  states he overdosed a year ago.  no physical complaints.    8/9: pt refusing interview; continues to endorse to nursing staff plan to jump in front of bus    8/10: pt in bed most of day; uncooperative; reports withdrawing from heroin w/ complaint of back ache, runny nose and requesting methadone--COWS score 8 suggests only mild withdrawal; becomes somewhat hostile when not offered methadone; continues to endorse AH to jump in front of train; depakote discontinued due to pt's hx of non-compliance and possible effects compounding pt's sedation; Zyprexa discontinued (completion of cross-taper); will continue Invega 6 mg w/ plan for ZAMORA on discharge    8/11: Pt continues to lie in bed throughout day; not engaged with interview or attending groups; pt denies positive effect of medicine but appears slightly more alert today and less sedated.    08/14: persistent suicidal ideation w/ CAH to jump in front of train; pt appears slightly more related;    8/15: interested in Neurontin for back pain and an antidepressant;  raising Invega to 9mg a day for psychosis   and starting Zoloft 25mg po daily for depression    8/16: tolerating medications. reporting "happier mood but continued AH to jump in front of train. Increasing Zoloft to 50 mg for depression and continuing Invega 9 mg w/ plan for Sustenna prior to discharge. pt is aware of Neurontin prn for back pain.    08/17: given patient's positive response to Zoloft, will increase to 100 mg po. pt continuing to endorse AH to jump in front of train -- continuing Invega 9 mg and I/G/T therapy to address    08/18: pt showing improvement with current medications but persistent AH w/ SI; attributes improved mood to zoloft, which was increased to 100 mg yesterday. will continue medication regimen and continue to evaluate pt's potential for outpatient mgmt.    08/21: per nursing, pt more engaging and visible over weekend. mood appears to be responding positively to medical regimen. persistent auditory hallucinations are beginning to lessen.    ;;8/22: smiles strangely when talking vaguely about hallucinations; insists he needs to stay longer; considering rehabilitation..    8/23: Pt continues to smile while reporting AH to jump in front of train; there is some question of secondary gain. Currently reporting improved mood and good tolerance of medications; working with social work for possible placement at a rehabilitation facility.

## 2017-08-23 NOTE — PROGRESS NOTE BEHAVIORAL HEALTH - PROBLEM SELECTOR PLAN 1
Continue Invega 9 mg (increased from 6 mg on 8/15); will consider switching to SUSTENNA prior to discharge  Continue Zoloft 100 mg po to address depressive symptoms  ammonia level WNL  I/G/T therapy  urinalysis/urine culture results unremarkable

## 2017-08-23 NOTE — PROGRESS NOTE BEHAVIORAL HEALTH - CASE SUMMARY
51 year old man domiciled in shelter, history of schizoaffective disorder, many PPH, h/o substance abuse, h/o chronic non compliance, one serious SA via OD, with SI and AH in context of recent drug use and  medication non-compliance.  Upon admission, pt is compliant with medication and presents organized in behavioral control.  He is sleepign well at night and has a good appetite.  He is visible on the unit and attends groups.  Although he continues to report hearing command AH to jump in front of train, patient's euthymic affect, no evidence of responding to internal stimuli, appropriate socialization on the unit, without evidence of neurovegetative factors makes patient's risk secondary to CAH low.  Must also consider that pt is feigning sx for secondary gain.  When confronted about this, patient expresses ambivalence re: intermittent passive SI and CAH and states that he feels safe in the hospital.  Patient endorses desire to go to rehab upon discharge.  Psychoeducation provided re: importance of treatment compliance and abstinence from drugs/EtOH. Cont tx as above.  IGM tx.  Discharge planning to rehab.

## 2017-08-23 NOTE — PROGRESS NOTE BEHAVIORAL HEALTH - NSBHFUPSUICINTERVALFT_PSY_A_CORE
Pt continues to endorse to nursing staff plan to jump in front of bus.
Pt continues to endorse plan to jump in front of bus/train.
Pt continues to endorse to nursing staff plan to jump in front of bus.

## 2017-08-23 NOTE — PROGRESS NOTE BEHAVIORAL HEALTH - NSBHFUPSUICINTENT_PSY_A_CORE
unable to assess

## 2017-08-24 PROCEDURE — 99231 SBSQ HOSP IP/OBS SF/LOW 25: CPT

## 2017-08-24 RX ADMIN — Medication 50 MILLIGRAM(S): at 21:50

## 2017-08-24 RX ADMIN — GABAPENTIN 300 MILLIGRAM(S): 400 CAPSULE ORAL at 21:49

## 2017-08-24 RX ADMIN — Medication 1 PATCH: at 10:01

## 2017-08-24 RX ADMIN — PALIPERIDONE 9 MILLIGRAM(S): 1.5 TABLET, EXTENDED RELEASE ORAL at 10:02

## 2017-08-24 RX ADMIN — Medication 1 PATCH: at 10:03

## 2017-08-24 RX ADMIN — GABAPENTIN 300 MILLIGRAM(S): 400 CAPSULE ORAL at 10:02

## 2017-08-24 RX ADMIN — SERTRALINE 100 MILLIGRAM(S): 25 TABLET, FILM COATED ORAL at 10:02

## 2017-08-24 NOTE — PROGRESS NOTE BEHAVIORAL HEALTH - NSBHFUPINTERVALHXFT_PSY_A_CORE
Patient interviewed this morning at bedside following breakfast, observed lying in bed comfortably reading a newspaper. Patient no longer endorsing suicidal thoughts or auditory hallucinations. Patient is future oriented toward possible rehab placement. Sleep, appetite, energy ok. Tolerating medicines without side effects.

## 2017-08-24 NOTE — PROGRESS NOTE BEHAVIORAL HEALTH - RISK ASSESSMENT
fixed risk factors: demographic (male, older); homeless; unemployed; long hx of substance abuse; hx of mood disorder; hx of command hallucinations  protective factors. somewhat verbal and willing to take medications  modifiable factors: hallucinations being addressed with Invega; mood being addressed with Invega and Zoloft and group and milieu therapy.    Pt is at chronic heightened risk due to long history of substance abuse and mood disorder, unemployment, homelessness; however pt is showing significant improvement with inpatient supervision and medication management. No longer endorsing CAH to jump in front of train. Acute risk of self-harm is diminished.

## 2017-08-24 NOTE — PROGRESS NOTE BEHAVIORAL HEALTH - SUMMARY
51 year old man domiciled in shelter, history of schizoaffective disorder, many PPH, substance abuse, chronic non compliance, 1 serious SA OD, with SI and AH in context of recent drug use but also continued lack of structure and medications. Pt has responded well to Invega and Zoloft but was endorsing continued CAH to jump in front of train until recently. There was some indication of secondary gain as patient's euthymic affect, lack of internal stimuli, and social interaction on unit belied his stated suicidality/AH. Today patient is reporting no CAH and good mood and is future oriented toward possible rehab placement. Will transition to and await placement in rehab facility.

## 2017-08-24 NOTE — PROGRESS NOTE BEHAVIORAL HEALTH - CASE SUMMARY
51 year old man domiciled in shelter, history of schizoaffective disorder, many PPH, h/o substance abuse, h/o chronic non compliance, one serious SA via OD, with SI and AH in context of recent drug use and  medication non-compliance.  Upon admission, pt is compliant with medication and presents organized in behavioral control.  He is sleeping well at night and has a good appetite.  He is visible on the unit and attends groups.  Today, patient denies SI/HI/AH/VH, and continues to have euthymic affect.  He is oddly related.  Patient's euthymic affect, no evidence of responding to internal stimuli, appropriate socialization on the unit, without evidence of neurovegetative factors makes patient's risk secondary to CAH low.  Must also consider that pt was feigning sx for secondary gain.   Patient continues to endorse desire to go to rehab upon discharge.  Psychoeducation provided re: importance of treatment compliance and abstinence from drugs/EtOH. Cont tx as above.  IGM tx.  Discharge planning to rehab.

## 2017-08-24 NOTE — PROGRESS NOTE BEHAVIORAL HEALTH - PROBLEM SELECTOR PLAN 1
Continue Invega 9 mg (increased from 6 mg on 8/15)   Continue Zoloft 100 mg po to address depressive symptoms  I/G/T therapy

## 2017-08-25 PROCEDURE — 99231 SBSQ HOSP IP/OBS SF/LOW 25: CPT

## 2017-08-25 RX ADMIN — Medication 50 MILLIGRAM(S): at 22:24

## 2017-08-25 RX ADMIN — PALIPERIDONE 9 MILLIGRAM(S): 1.5 TABLET, EXTENDED RELEASE ORAL at 10:06

## 2017-08-25 RX ADMIN — Medication 1 PATCH: at 10:12

## 2017-08-25 RX ADMIN — SERTRALINE 100 MILLIGRAM(S): 25 TABLET, FILM COATED ORAL at 10:06

## 2017-08-25 RX ADMIN — GABAPENTIN 300 MILLIGRAM(S): 400 CAPSULE ORAL at 22:24

## 2017-08-25 RX ADMIN — Medication 1 PATCH: at 10:06

## 2017-08-25 RX ADMIN — GABAPENTIN 300 MILLIGRAM(S): 400 CAPSULE ORAL at 10:08

## 2017-08-25 NOTE — PROGRESS NOTE BEHAVIORAL HEALTH - NSBHADMITMEDEDUDETAILS_A_CORE FT
r/b/a of invega discussed. reviewed metabolic risk.

## 2017-08-25 NOTE — PROGRESS NOTE BEHAVIORAL HEALTH - NSBHFUPINTERVALHXFT_PSY_A_CORE
Pt interviewed this morning following breakfast in the dining area. Pt observed finishing breakfast, euthymic affect, smiles at interviewer. Reports no issues with his medication. Future oriented toward rehab. Denies SI/HI/AH/VH. Pt interviewed this morning following breakfast in the dining area. Pt observed finishing breakfast, euthymic affect, smiles at interviewer. Reports no issues with his medication. Future-oriented toward rehab. Denies SI/HI/AH/VH.

## 2017-08-25 NOTE — PROGRESS NOTE BEHAVIORAL HEALTH - PROBLEM SELECTOR PLAN 2
Utox positive for cocaine, thc, opiates on admission.  Encourage 12 step  I/G/T therapy Utox positive for cocaine, THC, opiates on admission.  Encourage 12 step  I/G/M therapy  brief intervention  motivational interviewing

## 2017-08-25 NOTE — PROGRESS NOTE BEHAVIORAL HEALTH - PROBLEM SELECTOR PLAN 1
Continue Invega 9 mg (increased from 6 mg on 8/15)   Continue Zoloft 100 mg po to address depressive symptoms  I/G/T therapy Continue Invega 9 mg (increased from 6 mg on 8/15)   Continue Zoloft 100 mg po to address depressive symptoms  I/G/M therapy

## 2017-08-25 NOTE — PROGRESS NOTE BEHAVIORAL HEALTH - SUMMARY
51 year old man domiciled in shelter, history of schizoaffective disorder, many PPH, substance abuse, chronic non compliance, 1 serious SA OD, with SI and AH in context of recent drug use but also continued lack of structure and medications. Pt has responded well to Invega and Zoloft but was endorsing continued CAH to jump in front of train until recently. There was some indication of secondary gain as patient's euthymic affect, lack of internal stimuli, and social interaction on unit belied his stated suicidality/AH. Today patient continues to report no CAH and good mood and is future oriented toward possible rehab placement. Will transition to and await placement in rehab facility.

## 2017-08-25 NOTE — PROGRESS NOTE BEHAVIORAL HEALTH - CASE SUMMARY
51 year old man domiciled in shelter, history of schizoaffective disorder, many PPH, h/o substance abuse, h/o chronic non compliance, one serious SA via OD, with SI and AH in context of recent drug use and  medication non-compliance.  Upon admission, pt is compliant with medication and presents organized in behavioral control.  He is sleeping well at night and has a good appetite.  He is visible on the unit and attends groups.  Today, patient denies SI/HI/AH/VH, and continues to have euthymic affect.  He is oddly related.  Patient's euthymic affect, no evidence of responding to internal stimuli, appropriate socialization on the unit, without evidence of neurovegetative factors makes patient's risk secondary to CAH low.  Must also consider that pt was feigning sx for secondary gain.   Patient continues to endorse desire to go to rehab upon discharge.  Psychoeducation provided re: importance of treatment compliance and abstinence from drugs/EtOH. Cont tx as above.  IGM tx.  Discharge planning to rehab. 51 year old man domiciled in shelter, history of schizoaffective disorder, many PPH, h/o substance abuse, h/o chronic non compliance, one serious SA via OD, with SI and AH in context of recent drug use and  medication non-compliance.  Upon admission, pt is compliant with medication and presents organized in behavioral control.  He is sleeping well at night and has a good appetite.  He is visible on the unit and attends groups.  Today, patient denies SI/HI/AH/VH, and continues to have euthymic affect.  He is oddly related.  Patient's euthymic affect, no evidence of responding to internal stimuli, appropriate socialization on the unit, without evidence of neurovegetative factors makes patient's risk secondary to CAH low.  Must also consider that pt was feigning sx for secondary gain.   Patient continues to endorse desire to go to rehab upon discharge.  Psychoeducation provided re: importance of treatment compliance and abstinence from drugs/EtOH. Cont tx as above.  IGM tx.  Discharge planning to rehab, awaiting rehab bed.

## 2017-08-26 RX ADMIN — Medication 1 PATCH: at 10:45

## 2017-08-26 RX ADMIN — PALIPERIDONE 9 MILLIGRAM(S): 1.5 TABLET, EXTENDED RELEASE ORAL at 10:24

## 2017-08-26 RX ADMIN — Medication 1 PATCH: at 10:23

## 2017-08-26 RX ADMIN — GABAPENTIN 300 MILLIGRAM(S): 400 CAPSULE ORAL at 10:24

## 2017-08-26 RX ADMIN — GABAPENTIN 300 MILLIGRAM(S): 400 CAPSULE ORAL at 21:21

## 2017-08-26 RX ADMIN — SERTRALINE 100 MILLIGRAM(S): 25 TABLET, FILM COATED ORAL at 10:24

## 2017-08-26 RX ADMIN — Medication 50 MILLIGRAM(S): at 21:21

## 2017-08-27 RX ADMIN — GABAPENTIN 300 MILLIGRAM(S): 400 CAPSULE ORAL at 22:21

## 2017-08-27 RX ADMIN — Medication 1 PATCH: at 10:01

## 2017-08-27 RX ADMIN — Medication 1 PATCH: at 10:36

## 2017-08-27 RX ADMIN — Medication 50 MILLIGRAM(S): at 22:21

## 2017-08-27 RX ADMIN — SERTRALINE 100 MILLIGRAM(S): 25 TABLET, FILM COATED ORAL at 10:00

## 2017-08-27 RX ADMIN — GABAPENTIN 300 MILLIGRAM(S): 400 CAPSULE ORAL at 17:22

## 2017-08-27 RX ADMIN — PALIPERIDONE 9 MILLIGRAM(S): 1.5 TABLET, EXTENDED RELEASE ORAL at 10:01

## 2017-08-27 RX ADMIN — GABAPENTIN 300 MILLIGRAM(S): 400 CAPSULE ORAL at 10:01

## 2017-08-28 PROCEDURE — 99231 SBSQ HOSP IP/OBS SF/LOW 25: CPT

## 2017-08-28 RX ORDER — TUBERCULIN PURIFIED PROTEIN DERIVATIVE 5 [IU]/.1ML
5 INJECTION, SOLUTION INTRADERMAL ONCE
Qty: 0 | Refills: 0 | Status: COMPLETED | OUTPATIENT
Start: 2017-08-28 | End: 2017-08-28

## 2017-08-28 RX ADMIN — TUBERCULIN PURIFIED PROTEIN DERIVATIVE 5 UNIT(S): 5 INJECTION, SOLUTION INTRADERMAL at 16:17

## 2017-08-28 RX ADMIN — Medication 1 PATCH: at 11:13

## 2017-08-28 RX ADMIN — Medication 50 MILLIGRAM(S): at 21:25

## 2017-08-28 RX ADMIN — GABAPENTIN 300 MILLIGRAM(S): 400 CAPSULE ORAL at 21:25

## 2017-08-28 RX ADMIN — Medication 1 PATCH: at 11:15

## 2017-08-28 RX ADMIN — SERTRALINE 100 MILLIGRAM(S): 25 TABLET, FILM COATED ORAL at 11:14

## 2017-08-28 RX ADMIN — GABAPENTIN 300 MILLIGRAM(S): 400 CAPSULE ORAL at 11:14

## 2017-08-28 RX ADMIN — PALIPERIDONE 9 MILLIGRAM(S): 1.5 TABLET, EXTENDED RELEASE ORAL at 11:14

## 2017-08-28 NOTE — PROGRESS NOTE BEHAVIORAL HEALTH - NSBHFUPINTERVALHXFT_PSY_A_CORE
Pt interviewed this morning following breakfast, observed lying comfortably in bed. Pt denies current AH or SI. Denies depressive symptoms. Sleep, appetite are reported fine. Tolerating medications without side effects. Future oriented toward possible rehab placement. Patient states he has a problem with crack cocaine and heroin use and would like to go to rehab facility. Pt interviewed this morning following breakfast, observed lying comfortably in bed. Pt denies current AH or SI. Denies depressive symptoms. Sleep, appetite are reported fine. Tolerating medications without side effects. Future oriented toward possible rehab placement. Patient states he has a problem with crack cocaine and heroin use and would like to go to rehab facility. Per , patient may be accepted to [facility] facility by end of week.

## 2017-08-28 NOTE — PROGRESS NOTE BEHAVIORAL HEALTH - CASE SUMMARY
51 year old man domiciled in shelter, history of schizoaffective disorder, many PPH, h/o substance abuse, h/o chronic non compliance, one serious SA via OD, with SI and AH in context of recent drug use and  medication non-compliance.  Upon admission, pt is compliant with medication and presents organized in behavioral control.  He is sleeping well at night and has a good appetite.  He is visible on the unit and attends groups.  Today, patient denies SI/HI/AH/VH, and continues to have euthymic affect.  He is oddly related.  Patient's euthymic affect, no evidence of responding to internal stimuli, appropriate socialization on the unit, without evidence of neurovegetative factors makes patient's risk secondary to CAH low.  Must also consider that pt was feigning sx for secondary gain.   Patient continues to endorse desire to go to rehab upon discharge.  Psychoeducation provided re: importance of treatment compliance and abstinence from drugs/EtOH. Cont tx as above.  IGM tx.  Discharge planning to rehab, awaiting rehab bed. 51 year old man domiciled in shelter, history of schizoaffective disorder, many PPH, h/o substance abuse, h/o chronic non compliance, one serious SA via OD, with SI and AH in context of recent drug use and  medication non-compliance.  Upon admission, pt is compliant with medication and presents organized in behavioral control.  He is sleeping well at night and has a good appetite.  He is visible on the unit and attends groups.  Today, patient denies SI/HI/AH/VH, and continues to have euthymic affect. He had an uneventful weekend. He is oddly related.  Patient's euthymic affect, no evidence of responding to internal stimuli, appropriate socialization on the unit, without evidence of neurovegetative factors makes patient's risk secondary to CAH low.   Patient continues to endorse desire to go to rehab upon discharge.  Psychoeducation provided re: importance of treatment compliance and abstinence from drugs/EtOH. Cont tx as above.  IGM tx.  Discharge planning to rehab, awaiting rehab bed.

## 2017-08-28 NOTE — PROGRESS NOTE BEHAVIORAL HEALTH - SUMMARY
51 year old man domiciled in shelter, history of schizoaffective disorder, many PPH, substance abuse, chronic non compliance, 1 serious SA OD, with SI and AH in context of recent drug use but also continued lack of structure and medications. Pt has responded well to Invega and Zoloft but was endorsing continued CAH to jump in front of train until recently. There was some indication of secondary gain as patient's euthymic affect, lack of internal stimuli, and social interaction on unit belied his stated suicidality/AH. Today patient continues to report no CAH and good mood and is future oriented toward possible rehab placement. Transitioning to aftercare planning, to a rehab facility if pt is accepted.

## 2017-08-28 NOTE — PROGRESS NOTE BEHAVIORAL HEALTH - PROBLEM SELECTOR PLAN 2
Utox positive for cocaine, THC, opiates on admission.  Encourage 12 step  I/G/M therapy  brief intervention  motivational interviewing Utox positive for cocaine, THC, opiates on admission.  Encourage 12 step  I/G/M therapy  - PPD ordered for possible rehab placement: follow up in 48 to 72 hours  brief intervention  motivational interviewing

## 2017-08-28 NOTE — PROGRESS NOTE BEHAVIORAL HEALTH - PROBLEM SELECTOR PLAN 1
Continue Invega 9 mg (increased from 6 mg on 8/15)   Continue Zoloft 100 mg po to address depressive symptoms  I/G/M therapy

## 2017-08-29 PROCEDURE — 99231 SBSQ HOSP IP/OBS SF/LOW 25: CPT

## 2017-08-29 RX ADMIN — GABAPENTIN 300 MILLIGRAM(S): 400 CAPSULE ORAL at 09:36

## 2017-08-29 RX ADMIN — PALIPERIDONE 9 MILLIGRAM(S): 1.5 TABLET, EXTENDED RELEASE ORAL at 09:35

## 2017-08-29 RX ADMIN — Medication 1 PATCH: at 09:36

## 2017-08-29 RX ADMIN — GABAPENTIN 300 MILLIGRAM(S): 400 CAPSULE ORAL at 21:28

## 2017-08-29 RX ADMIN — Medication 1 PATCH: at 09:39

## 2017-08-29 RX ADMIN — SERTRALINE 100 MILLIGRAM(S): 25 TABLET, FILM COATED ORAL at 09:36

## 2017-08-29 RX ADMIN — Medication 50 MILLIGRAM(S): at 21:28

## 2017-08-29 NOTE — PROGRESS NOTE BEHAVIORAL HEALTH - RISK ASSESSMENT
fixed risk factors: demographic (male, older); homeless; unemployed; long hx of substance abuse; hx of mood disorder; hx of command hallucinations  protective factors. somewhat verbal and willing to take medications  modifiable factors: hallucinations addressed with Invega; mood addressed with Invega and Zoloft and group and milieu therapy.    Pt is at chronic heightened risk due to long history of substance abuse and mood disorder, unemployment, homelessness; however pt is showing significant improvement with inpatient supervision and medication management. No longer endorsing CAH to jump in front of train. Therefore, acute risk of self-harm is diminished.

## 2017-08-29 NOTE — PROGRESS NOTE BEHAVIORAL HEALTH - PROBLEM SELECTOR PLAN 2
Utox positive for cocaine, THC, opiates on admission.  Encourage 12 step  I/G/M therapy  -brief intervention  -motivational interviewing  - PPD ordered for possible rehab placement: follow up in 48 to 72 hours

## 2017-08-29 NOTE — PROGRESS NOTE BEHAVIORAL HEALTH - SUMMARY
51 year old man domiciled in shelter, history of schizoaffective disorder, many PPH, substance abuse, chronic non compliance, 1 serious SA OD, with SI and AH in context of recent drug use but also continued lack of structure and medications. Pt has responded well to Invega and Zoloft but was endorsing continued CAH to jump in front of train until recently. There was some indication of secondary gain as patient's euthymic affect, lack of internal stimuli, and social interaction on unit belied his stated suicidality/AH. Today patient continues to deny SI/HI/AH/VH and reports good mood and is future oriented toward possible rehab placement. Transitioning to aftercare planning, to a rehab facility if pt is accepted.

## 2017-08-29 NOTE — PROGRESS NOTE BEHAVIORAL HEALTH - NSBHFUPINTERVALHXFT_PSY_A_CORE
51 year old man domiciled in shelter, history of schizoaffective disorder, many PPH, h/o substance abuse, h/o chronic non compliance, one serious SA via OD, with SI and AH in context of recent drug use and  medication non-compliance.  Upon admission, pt is compliant with medication and presents organized in behavioral control.  He is sleeping well at night and has a good appetite.  He is visible on the unit and attends groups.  Today, patient denies SI/HI/AH/VH, and continues to have euthymic affect. He had an uneventful weekend. He is oddly related.  Does not appear to be responding to internal stimuli.  Patient continues to endorse desire to go to rehab upon discharge.  Psychoeducation provided re: importance of treatment compliance and abstinence from drugs/EtOH. Cont tx as above.  IGM tx.  Discharge planning to rehab, awaiting rehab bed.  PPD placed.  awaiting reading.

## 2017-08-30 PROCEDURE — 99231 SBSQ HOSP IP/OBS SF/LOW 25: CPT

## 2017-08-30 RX ADMIN — Medication 1 PATCH: at 10:10

## 2017-08-30 RX ADMIN — Medication 1 PATCH: at 10:07

## 2017-08-30 RX ADMIN — Medication 50 MILLIGRAM(S): at 21:47

## 2017-08-30 RX ADMIN — SERTRALINE 100 MILLIGRAM(S): 25 TABLET, FILM COATED ORAL at 10:07

## 2017-08-30 RX ADMIN — PALIPERIDONE 9 MILLIGRAM(S): 1.5 TABLET, EXTENDED RELEASE ORAL at 10:07

## 2017-08-30 RX ADMIN — GABAPENTIN 300 MILLIGRAM(S): 400 CAPSULE ORAL at 10:07

## 2017-08-30 RX ADMIN — GABAPENTIN 300 MILLIGRAM(S): 400 CAPSULE ORAL at 21:47

## 2017-08-30 NOTE — PROGRESS NOTE BEHAVIORAL HEALTH - CASE SUMMARY
51 year old man domiciled in shelter, history of schizoaffective disorder, many PPH, h/o substance abuse, h/o chronic non compliance, one serious SA via OD, with SI and AH in context of recent drug use and  medication non-compliance.  Upon admission, pt is compliant with medication and presents organized in behavioral control.  He is sleeping well at night and has a good appetite.  He is visible on the unit and attends groups.  Today, patient denies SI/HI/AH/VH, and continues to have euthymic affect. He had an uneventful weekend. He is oddly related.  Patient's euthymic affect, no evidence of responding to internal stimuli, appropriate socialization on the unit, without evidence of neurovegetative factors makes patient's risk secondary to CAH low.   Patient continues to endorse desire to go to rehab upon discharge.  Psychoeducation provided re: importance of treatment compliance and abstinence from drugs/EtOH. Cont tx as above.  IGM tx.  Discharge planning to rehab, awaiting rehab bed. 51 year old man domiciled in shelter, history of schizoaffective disorder, many PPH, h/o substance abuse, h/o chronic non compliance, one serious SA via OD, with SI and AH in context of recent drug use and  medication non-compliance.  Upon admission, pt is compliant with medication and presents organized in behavioral control.  He is sleeping well at night and has a good appetite.  He is visible on the unit and attends groups.  Today, patient denies SI/HI/AH/VH, and continues to have euthymic affect. He is oddly related.  Does not appear to be responding to internal stimuli.  Patient continues to endorse desire to go to rehab upon discharge.  Psychoeducation provided re: importance of treatment compliance and abstinence from drugs/EtOH. Cont tx as above.  IGM tx.  f/u PPD reading.  Discharge planning to rehab, awaiting rehab bed.

## 2017-08-30 NOTE — PROGRESS NOTE BEHAVIORAL HEALTH - NSBHFUPINTERVALHXFT_PSY_A_CORE
Patient interviewed this morning during breakfast. Denies SI/HI/AH/VH. Reports "ok" mood, sleep, appetite. Tolerating medications without reported side effects. Awaiting possible rehab placement at end of week. PPD reading pending. Patient interviewed this morning during breakfast. Denies SI/HI/AH/VH. Reports "ok" mood, sleep, appetite. Tolerating medications without reported side effects. Awaiting possible rehab placement at end of week. Per social work, patient will be rescreened on 08/31 for placement at [facility]--patient was previously at [facility] but reportedly per staff there was "unmotivated". PPD reading pending.

## 2017-08-30 NOTE — PROGRESS NOTE BEHAVIORAL HEALTH - PROBLEM SELECTOR PLAN 2
Utox positive for cocaine, THC, opiates on admission.  Encourage 12 step  I/G/M therapy  - PPD ordered for possible rehab placement: follow up in 48 to 72 hours  brief intervention  motivational interviewing

## 2017-08-31 PROCEDURE — 99232 SBSQ HOSP IP/OBS MODERATE 35: CPT

## 2017-08-31 RX ORDER — PALIPERIDONE 1.5 MG/1
234 TABLET, EXTENDED RELEASE ORAL ONCE
Qty: 0 | Refills: 0 | Status: COMPLETED | OUTPATIENT
Start: 2017-08-31 | End: 2017-08-31

## 2017-08-31 RX ADMIN — PALIPERIDONE 9 MILLIGRAM(S): 1.5 TABLET, EXTENDED RELEASE ORAL at 09:48

## 2017-08-31 RX ADMIN — GABAPENTIN 300 MILLIGRAM(S): 400 CAPSULE ORAL at 21:55

## 2017-08-31 RX ADMIN — Medication 1 PATCH: at 09:51

## 2017-08-31 RX ADMIN — SERTRALINE 100 MILLIGRAM(S): 25 TABLET, FILM COATED ORAL at 09:49

## 2017-08-31 RX ADMIN — GABAPENTIN 300 MILLIGRAM(S): 400 CAPSULE ORAL at 09:49

## 2017-08-31 RX ADMIN — Medication 1 PATCH: at 09:49

## 2017-08-31 RX ADMIN — Medication 50 MILLIGRAM(S): at 21:55

## 2017-08-31 RX ADMIN — PALIPERIDONE 234 MILLIGRAM(S): 1.5 TABLET, EXTENDED RELEASE ORAL at 17:35

## 2017-08-31 NOTE — PROGRESS NOTE BEHAVIORAL HEALTH - PROBLEM SELECTOR PLAN 1
Continue Invega 9 mg (increased from 6 mg on 8/15)   Continue Zoloft 100 mg po to address depressive symptoms  I/G/M therapy Initiating ZAMORA Sustenna: loading dose 234 mg ordered today to be followed with 156 mg within one week  Continue Invega 9 mg (increased from 6 mg on 8/15) until ZAMORA is given   Continue Zoloft 100 mg po to address depressive symptoms  I/G/M therapy

## 2017-08-31 NOTE — PROGRESS NOTE BEHAVIORAL HEALTH - NSBHFUPINTERVALHXFT_PSY_A_CORE
Pt interviewed at bedside following breakfast, observed resting comfortably. Responds with brief answers, constricted affect but smiling throughout interview. Continues to deny SI/HI and AH/VH. Reports "good" mood. Denies symptoms of depression, geovanna, or psychosis. Tolerating medications without side effects. Future oriented toward possible rehab placement. Pt interviewed at bedside following breakfast, observed resting comfortably. Responds with brief answers, constricted affect but smiling throughout interview. Continues to deny SI/HI and AH/VH. Reports "good" mood. Denies symptoms of depression, geovanna, or psychosis. Tolerating medications without side effects. Future oriented toward possible rehab placement. PPD reading negative. Pt interviewed at bedside following breakfast, observed resting comfortably. Responds with brief answers, constricted affect but smiling throughout interview. Continues to deny SI/HI and AH/VH. Reports "good" mood. Denies symptoms of depression, geovanna, or psychosis. Tolerating medications without side effects. Agrees with plan to initiate ZAMORA Sustenna 234 mg with follow up 156 mg dose within one week. Future oriented toward possible rehab placement. PPD reading negative.

## 2017-08-31 NOTE — PROGRESS NOTE BEHAVIORAL HEALTH - NSBHCHARTREVIEWLAB_PSY_A_CORE FT
Utox on admission positive for cocaine, thc, opiates  Serum Lithium, Carbamezipine, and VPA below therapeutic levels     CBC Full  -  ( 07 Aug 2017 19:20 )  WBC Count : 6.9 K/uL  Hemoglobin : 12.7 g/dL  Hematocrit : 37.8 %  Platelet Count - Automated : 289 K/uL  Mean Cell Volume : 85.9 fL  Mean Cell Hemoglobin : 28.9 pg  Mean Cell Hemoglobin Concentration : 33.6 g/dL  Auto Neutrophil # : x  Auto Lymphocyte # : x  Auto Monocyte # : x  Auto Eosinophil # : x  Auto Basophil # : x  Auto Neutrophil % : 65.3 %  Auto Lymphocyte % : 19.7 %  Auto Monocyte % : 10.5 %  Auto Eosinophil % : 4.2 %  Auto Basophil % : 0.3 %    08-07    142  |  100  |  18  ----------------------------<  115<H>  4.0   |  26  |  1.00    Ca    9.7      07 Aug 2017 19:20    TPro  8.1  /  Alb  4.4  /  TBili  0.4  /  DBili  x   /  AST  21  /  ALT  13  /  AlkPhos  86  08-07      From previous admission,  06/09/17  HbA1c 5.3  Lipid panel: Total cholesterol 208,  PPD reading negative on 08/31    Utox on admission positive for cocaine, thc, opiates  Serum Lithium, Carbamezipine, and VPA below therapeutic levels     CBC Full  -  ( 07 Aug 2017 19:20 )  WBC Count : 6.9 K/uL  Hemoglobin : 12.7 g/dL  Hematocrit : 37.8 %  Platelet Count - Automated : 289 K/uL  Mean Cell Volume : 85.9 fL  Mean Cell Hemoglobin : 28.9 pg  Mean Cell Hemoglobin Concentration : 33.6 g/dL  Auto Neutrophil # : x  Auto Lymphocyte # : x  Auto Monocyte # : x  Auto Eosinophil # : x  Auto Basophil # : x  Auto Neutrophil % : 65.3 %  Auto Lymphocyte % : 19.7 %  Auto Monocyte % : 10.5 %  Auto Eosinophil % : 4.2 %  Auto Basophil % : 0.3 %    08-07    142  |  100  |  18  ----------------------------<  115<H>  4.0   |  26  |  1.00    Ca    9.7      07 Aug 2017 19:20    TPro  8.1  /  Alb  4.4  /  TBili  0.4  /  DBili  x   /  AST  21  /  ALT  13  /  AlkPhos  86  08-07      From previous admission,  06/09/17  HbA1c 5.3  Lipid panel: Total cholesterol 208,  PPD reading negative on 08/31    Utox on admission positive for cocaine, thc, opiates  Serum Lithium, Carbamezapine, and VPA below therapeutic levels     CBC Full  -  ( 07 Aug 2017 19:20 )  WBC Count : 6.9 K/uL  Hemoglobin : 12.7 g/dL  Hematocrit : 37.8 %  Platelet Count - Automated : 289 K/uL  Mean Cell Volume : 85.9 fL  Mean Cell Hemoglobin : 28.9 pg  Mean Cell Hemoglobin Concentration : 33.6 g/dL  Auto Neutrophil # : x  Auto Lymphocyte # : x  Auto Monocyte # : x  Auto Eosinophil # : x  Auto Basophil # : x  Auto Neutrophil % : 65.3 %  Auto Lymphocyte % : 19.7 %  Auto Monocyte % : 10.5 %  Auto Eosinophil % : 4.2 %  Auto Basophil % : 0.3 %    08-07    142  |  100  |  18  ----------------------------<  115<H>  4.0   |  26  |  1.00    Ca    9.7      07 Aug 2017 19:20    TPro  8.1  /  Alb  4.4  /  TBili  0.4  /  DBili  x   /  AST  21  /  ALT  13  /  AlkPhos  86  08-07      From previous admission,  06/09/17  HbA1c 5.3  Lipid panel: Total cholesterol 208,

## 2017-08-31 NOTE — PROGRESS NOTE BEHAVIORAL HEALTH - CASE SUMMARY
51 year old man domiciled in shelter, history of schizoaffective disorder, many PPH, h/o substance abuse, h/o chronic non compliance, one serious SA via OD, with SI and AH in context of recent drug use and  medication non-compliance.  Upon admission, pt is compliant with medication and presents organized in behavioral control.  He is sleeping well at night and has a good appetite.  He is visible on the unit and attends groups.  Today, patient denies SI/HI/AH/VH, and continues to have euthymic affect. He is oddly related.  Does not appear to be responding to internal stimuli.  Patient continues to endorse desire to go to rehab upon discharge.  Psychoeducation provided re: importance of treatment compliance and abstinence from drugs/EtOH. Cont tx as above.  IGM tx.  f/u PPD reading.  Discharge planning to rehab, awaiting rehab bed. 51 year old man domiciled in shelter, history of schizoaffective disorder, many PPH, h/o substance abuse, h/o chronic non compliance, one serious SA via OD, with SI and AH in context of recent drug use and  medication non-compliance.  Upon admission, pt is compliant with medication and presents organized in behavioral control.  He is sleeping well at night and has a good appetite.  He is visible on the unit and attends groups.  Today, patient denies SI/HI/AH/VH, and continues to have euthymic affect. He is oddly related.  Does not appear to be responding to internal stimuli.  PPD read as negative.  Patient continues to endorse desire to go to rehab upon discharge.  Psychoeducation provided re: importance of treatment compliance and abstinence from drugs/EtOH. Cont tx as above.  agree with ZAMORA- invegta sustena 234mg im today, to be followed by second dose of 156mg in one week.  IGM tx.   Discharge planning to rehab, awaiting rehab bed.

## 2017-08-31 NOTE — PROGRESS NOTE BEHAVIORAL HEALTH - NSBHADMITCOORDCAREOTHER_PSY_A_CORE FT
nursing, creative arts therapists

## 2017-08-31 NOTE — PROGRESS NOTE BEHAVIORAL HEALTH - NSBHADMITCOORDWITH_PSY_A_CORE
other/medical staff/social work
medical staff/social work/other
medical staff/social work/other
social work/other/medical staff
other/medical staff/social work
medical staff/social work/other
social work/medical staff
medical staff/other/social work

## 2017-08-31 NOTE — PROGRESS NOTE BEHAVIORAL HEALTH - PROBLEM SELECTOR PLAN 2
Utox positive for cocaine, THC, opiates on admission.  Encourage 12 step  I/G/M therapy  - PPD ordered for possible rehab placement: follow up in 48 to 72 hours  brief intervention  motivational interviewing Utox positive for cocaine, THC, opiates on admission.  Encourage 12 step  I/G/M therapy  - PPD reading negatvie  brief intervention  motivational interviewing

## 2017-08-31 NOTE — PROGRESS NOTE BEHAVIORAL HEALTH - NSBHADMITCOUNSEL_PSY_A_CORE
instructions for management, treatment and follow up/prognosis/risks and benefits of treatment options/client/family/caregiver education/diagnostic results/impressions and/or recommended studies/importance of adherence to chosen treatment/risk factor reduction
diagnostic results/impressions and/or recommended studies/prognosis/risks and benefits of treatment options/instructions for management, treatment and follow up/importance of adherence to chosen treatment/client/family/caregiver education/risk factor reduction
diagnostic results/impressions and/or recommended studies/risks and benefits of treatment options/importance of adherence to chosen treatment/instructions for management, treatment and follow up/client/family/caregiver education/risk factor reduction/prognosis
risk factor reduction/diagnostic results/impressions and/or recommended studies/risks and benefits of treatment options/client/family/caregiver education/importance of adherence to chosen treatment/instructions for management, treatment and follow up/prognosis
risks and benefits of treatment options/instructions for management, treatment and follow up/risk factor reduction/importance of adherence to chosen treatment/client/family/caregiver education/diagnostic results/impressions and/or recommended studies/prognosis
importance of adherence to chosen treatment/diagnostic results/impressions and/or recommended studies/prognosis/instructions for management, treatment and follow up/risk factor reduction/client/family/caregiver education/risks and benefits of treatment options
diagnostic results/impressions and/or recommended studies/risks and benefits of treatment options/importance of adherence to chosen treatment/instructions for management, treatment and follow up
diagnostic results/impressions and/or recommended studies/risks and benefits of treatment options/importance of adherence to chosen treatment/instructions for management, treatment and follow up/risk factor reduction/prognosis/client/family/caregiver education

## 2017-08-31 NOTE — PROGRESS NOTE BEHAVIORAL HEALTH - SUMMARY
51 year old man domiciled in shelter, history of schizoaffective disorder, many PPH, substance abuse, chronic non compliance, 1 serious SA OD, with SI and AH in context of recent drug use but also continued lack of structure and medications. Pt has responded well to Invega and Zoloft but was endorsing continued CAH to jump in front of train until recently. There was some indication of secondary gain as patient's euthymic affect, lack of internal stimuli, and social interaction on unit belied his stated suicidality/AH. Today patient continues to report no CAH and good mood and is future oriented toward possible rehab placement. Transitioning to aftercare planning, to a rehab facility if pt is accepted. 51 year old man domiciled in shelter, history of schizoaffective disorder, many PPH, substance abuse, chronic non compliance, 1 serious SA OD, with SI and AH in context of recent drug use but also continued lack of structure and medications. Pt has responded well to Invega and Zoloft but was endorsing continued CAH to jump in front of train until recently. There was some indication of secondary gain as patient's euthymic affect, lack of internal stimuli, and social interaction on unit belied his stated suicidality/AH. Today patient continues to report no CAH and good mood and is future oriented toward possible rehab placement. Patient has agreed to ZAMORA Sustenna, which is indicated given patient's history of non-compliance once discharged, and a loading dose 234 mg has been ordered. Transitioning to aftercare planning, to a rehab facility if pt is accepted.

## 2017-09-01 PROCEDURE — 99231 SBSQ HOSP IP/OBS SF/LOW 25: CPT

## 2017-09-01 RX ORDER — PALIPERIDONE 1.5 MG/1
156 TABLET, EXTENDED RELEASE ORAL ONCE
Qty: 0 | Refills: 0 | Status: COMPLETED | OUTPATIENT
Start: 2017-09-04 | End: 2017-09-04

## 2017-09-01 RX ORDER — PALIPERIDONE 1.5 MG/1
9 TABLET, EXTENDED RELEASE ORAL DAILY
Qty: 0 | Refills: 0 | Status: DISCONTINUED | OUTPATIENT
Start: 2017-09-01 | End: 2017-09-05

## 2017-09-01 RX ORDER — PALIPERIDONE 1.5 MG/1
6 TABLET, EXTENDED RELEASE ORAL DAILY
Qty: 0 | Refills: 0 | Status: DISCONTINUED | OUTPATIENT
Start: 2017-09-01 | End: 2017-09-01

## 2017-09-01 RX ADMIN — SERTRALINE 100 MILLIGRAM(S): 25 TABLET, FILM COATED ORAL at 10:10

## 2017-09-01 RX ADMIN — GABAPENTIN 300 MILLIGRAM(S): 400 CAPSULE ORAL at 10:10

## 2017-09-01 RX ADMIN — Medication 1 PATCH: at 10:10

## 2017-09-01 RX ADMIN — Medication 1 PATCH: at 10:33

## 2017-09-01 RX ADMIN — PALIPERIDONE 9 MILLIGRAM(S): 1.5 TABLET, EXTENDED RELEASE ORAL at 10:10

## 2017-09-01 RX ADMIN — Medication 50 MILLIGRAM(S): at 21:30

## 2017-09-01 RX ADMIN — GABAPENTIN 300 MILLIGRAM(S): 400 CAPSULE ORAL at 21:30

## 2017-09-01 NOTE — PROGRESS NOTE BEHAVIORAL HEALTH - NSBHFUPINTERVALHXFT_PSY_A_CORE
Patient interviewed this morning following breakfast. Continues to report "good" mood, no issues with sleep or appetite. Continues to deny auditory hallucinations, suicidal ideation, depressed mood. Future oriented toward possible rehab placement. Awaiting bed at [place], where patient has previously stayed.    Received Invega Sustenna yesterday. No complaints of injection site pain or irritation. Patient interviewed this morning following breakfast. Continues to report "good" mood, no issues with sleep or appetite. Continues to deny auditory hallucinations, suicidal ideation, depressed mood. Future oriented toward possible rehab placement. Awaiting bed at [facility], where patient has previously stayed.    Received Invega Sustenna yesterday. Complains of mild injection site pain.

## 2017-09-01 NOTE — PROGRESS NOTE BEHAVIORAL HEALTH - CASE SUMMARY
see above  ;; 9/1: denies AH and guarded about depressed mood but smiles broadly; wants to go to rehab and awaiting bed availability.

## 2017-09-01 NOTE — PROGRESS NOTE BEHAVIORAL HEALTH - NSBHCHARTREVIEWLAB_PSY_A_CORE FT
PPD reading negative on 08/31    Utox on admission positive for cocaine, thc, opiates  Serum Lithium, Carbamezapine, and VPA below therapeutic levels     CBC Full  -  ( 07 Aug 2017 19:20 )  WBC Count : 6.9 K/uL  Hemoglobin : 12.7 g/dL  Hematocrit : 37.8 %  Platelet Count - Automated : 289 K/uL  Mean Cell Volume : 85.9 fL  Mean Cell Hemoglobin : 28.9 pg  Mean Cell Hemoglobin Concentration : 33.6 g/dL  Auto Neutrophil # : x  Auto Lymphocyte # : x  Auto Monocyte # : x  Auto Eosinophil # : x  Auto Basophil # : x  Auto Neutrophil % : 65.3 %  Auto Lymphocyte % : 19.7 %  Auto Monocyte % : 10.5 %  Auto Eosinophil % : 4.2 %  Auto Basophil % : 0.3 %    08-07    142  |  100  |  18  ----------------------------<  115<H>  4.0   |  26  |  1.00    Ca    9.7      07 Aug 2017 19:20    TPro  8.1  /  Alb  4.4  /  TBili  0.4  /  DBili  x   /  AST  21  /  ALT  13  /  AlkPhos  86  08-07      From previous admission,  06/09/17  HbA1c 5.3  Lipid panel: Total cholesterol 208,

## 2017-09-01 NOTE — PROGRESS NOTE BEHAVIORAL HEALTH - PROBLEM SELECTOR PLAN 2
Utox positive for cocaine, THC, opiates on admission.  Encourage 12 step  I/G/M therapy  - PPD reading negatvie  brief intervention  motivational interviewing

## 2017-09-01 NOTE — PROGRESS NOTE BEHAVIORAL HEALTH - MODIFICATIONS
continue with Invega tapering oral dose after receiving SUSTENNA at 345mg yesterday in anticipation of 156mg IM in a week.

## 2017-09-01 NOTE — PROGRESS NOTE BEHAVIORAL HEALTH - PROBLEM SELECTOR PLAN 1
JUSTIN Wheelerenna: loading dose 234 mg received on 08/31 to be followed with 156 mg within one week  Continue Invega 9 mg (increased from 6 mg on 8/15) until follow up ZAMORA is given   Continue Zoloft 100 mg po to address depressive symptoms  I/G/M therapy JUSTIN Hampton: loading dose 234 mg received on 08/31 to be followed with 156 mg within one week  Tapering down Invega to 6 mg on 09/01 with plan to decrease to 3 mg on 09/03 and discontinue on 09/05  Continue Zoloft 100 mg po to address depressive symptoms  I/G/M therapy JUSTIN Sustenna: loading dose 234 mg received on 08/31 to be followed with 156 mg within one week  Continuing Invega po 9 mg for one week from Sustenna loading dose and then may gradually taper down   Continue Zoloft 100 mg po to address depressive symptoms  I/G/M therapy

## 2017-09-01 NOTE — PROGRESS NOTE BEHAVIORAL HEALTH - SUMMARY
51 year old man domiciled in shelter, history of schizoaffective disorder, many PPH, substance abuse, chronic non compliance, 1 serious SA OD, with SI and AH in context of recent drug use but also continued lack of structure and medications. Pt has responded well to Invega and Zoloft but was endorsing continued CAH to jump in front of train until recently. There was some indication of secondary gain as patient's euthymic affect, lack of internal stimuli, and social interaction on unit belied his stated suicidality/AH. Today patient continues to report no CAH and good mood and is future oriented toward possible rehab placement. Patient has agreed to ZAMORA Sustenna, which is indicated given patient's history of non-compliance once discharged, and a loading dose 234 mg was given on 08/31. Follow up dose 156 mg within one week. Continuing oral medication until follow up is received as patient has history of non-compliance. Transitioning to aftercare planning, to a rehab facility if pt is accepted. 51 year old man domiciled in shelter, history of schizoaffective disorder, many PPH, substance abuse, chronic non compliance, 1 serious SA OD, with SI and AH in context of recent drug use but also continued lack of structure and medications. Pt has responded well to Invega and Zoloft but was endorsing continued CAH to jump in front of train until recently. There was some indication of secondary gain as patient's euthymic affect, lack of internal stimuli, and social interaction on unit belied his stated suicidality/AH. Today patient continues to report no CAH and good mood and is future oriented toward possible rehab placement. Patient has agreed to ZAMORA Sustenna, which is indicated given patient's history of non-compliance once discharged, and a loading dose 234 mg was given on 08/31. Follow up dose 156 mg within one week. Tapering down oral medication. Transitioning to aftercare planning, to a rehab facility if pt is accepted. 51 year old man domiciled in shelter, history of schizoaffective disorder, many PPH, substance abuse, chronic non compliance, 1 serious SA OD, with SI and AH in context of recent drug use but also continued lack of structure and medications. Pt has responded well to Invega and Zoloft but was endorsing continued CAH to jump in front of train until recently. There was some indication of secondary gain as patient's euthymic affect, lack of internal stimuli, and social interaction on unit belied his stated suicidality/AH. Today patient continues to report no CAH and good mood and is future oriented toward possible rehab placement. Patient has agreed to ZAMORA Sustenna, which is indicated given patient's history of non-compliance once discharged, and a loading dose 234 mg was given on 08/31. Follow up dose 156 mg within 4 to 7 days. Continuing po Invega for 1 week and then may gradually taper down. Transitioning to aftercare planning, to a rehab facility if pt is accepted.

## 2017-09-02 RX ADMIN — SERTRALINE 100 MILLIGRAM(S): 25 TABLET, FILM COATED ORAL at 10:07

## 2017-09-02 RX ADMIN — Medication 1 PATCH: at 10:08

## 2017-09-02 RX ADMIN — Medication 50 MILLIGRAM(S): at 21:13

## 2017-09-02 RX ADMIN — GABAPENTIN 300 MILLIGRAM(S): 400 CAPSULE ORAL at 10:09

## 2017-09-02 RX ADMIN — PALIPERIDONE 9 MILLIGRAM(S): 1.5 TABLET, EXTENDED RELEASE ORAL at 10:07

## 2017-09-02 RX ADMIN — GABAPENTIN 300 MILLIGRAM(S): 400 CAPSULE ORAL at 21:13

## 2017-09-02 RX ADMIN — Medication 1 PATCH: at 10:11

## 2017-09-03 RX ADMIN — GABAPENTIN 300 MILLIGRAM(S): 400 CAPSULE ORAL at 09:51

## 2017-09-03 RX ADMIN — Medication 1 PATCH: at 09:48

## 2017-09-03 RX ADMIN — Medication 1 PATCH: at 15:28

## 2017-09-03 RX ADMIN — SERTRALINE 100 MILLIGRAM(S): 25 TABLET, FILM COATED ORAL at 09:48

## 2017-09-03 RX ADMIN — PALIPERIDONE 9 MILLIGRAM(S): 1.5 TABLET, EXTENDED RELEASE ORAL at 09:48

## 2017-09-03 RX ADMIN — GABAPENTIN 300 MILLIGRAM(S): 400 CAPSULE ORAL at 21:38

## 2017-09-03 RX ADMIN — Medication 50 MILLIGRAM(S): at 21:38

## 2017-09-04 RX ADMIN — GABAPENTIN 300 MILLIGRAM(S): 400 CAPSULE ORAL at 10:22

## 2017-09-04 RX ADMIN — PALIPERIDONE 156 MILLIGRAM(S): 1.5 TABLET, EXTENDED RELEASE ORAL at 17:06

## 2017-09-04 RX ADMIN — Medication 25 MILLIGRAM(S): at 21:35

## 2017-09-04 RX ADMIN — Medication 1 PATCH: at 12:46

## 2017-09-04 RX ADMIN — GABAPENTIN 300 MILLIGRAM(S): 400 CAPSULE ORAL at 21:35

## 2017-09-04 RX ADMIN — Medication 1 PATCH: at 10:21

## 2017-09-04 RX ADMIN — SERTRALINE 100 MILLIGRAM(S): 25 TABLET, FILM COATED ORAL at 10:21

## 2017-09-04 RX ADMIN — PALIPERIDONE 9 MILLIGRAM(S): 1.5 TABLET, EXTENDED RELEASE ORAL at 10:21

## 2017-09-05 VITALS
DIASTOLIC BLOOD PRESSURE: 91 MMHG | SYSTOLIC BLOOD PRESSURE: 128 MMHG | RESPIRATION RATE: 16 BRPM | TEMPERATURE: 99 F | WEIGHT: 166.01 LBS | HEART RATE: 85 BPM

## 2017-09-05 PROCEDURE — 84443 ASSAY THYROID STIM HORMONE: CPT

## 2017-09-05 PROCEDURE — 93005 ELECTROCARDIOGRAM TRACING: CPT

## 2017-09-05 PROCEDURE — 85025 COMPLETE CBC W/AUTO DIFF WBC: CPT

## 2017-09-05 PROCEDURE — 81001 URINALYSIS AUTO W/SCOPE: CPT

## 2017-09-05 PROCEDURE — 80307 DRUG TEST PRSMV CHEM ANLYZR: CPT

## 2017-09-05 PROCEDURE — 82595 ASSAY OF CRYOGLOBULIN: CPT

## 2017-09-05 PROCEDURE — 82140 ASSAY OF AMMONIA: CPT

## 2017-09-05 PROCEDURE — 80156 ASSAY CARBAMAZEPINE TOTAL: CPT

## 2017-09-05 PROCEDURE — 80164 ASSAY DIPROPYLACETIC ACD TOT: CPT

## 2017-09-05 PROCEDURE — 99285 EMERGENCY DEPT VISIT HI MDM: CPT | Mod: 25

## 2017-09-05 PROCEDURE — 87086 URINE CULTURE/COLONY COUNT: CPT

## 2017-09-05 PROCEDURE — 36415 COLL VENOUS BLD VENIPUNCTURE: CPT

## 2017-09-05 PROCEDURE — 80178 ASSAY OF LITHIUM: CPT

## 2017-09-05 PROCEDURE — 80053 COMPREHEN METABOLIC PANEL: CPT

## 2017-09-05 PROCEDURE — 82585 ASSAY OF CRYOFIBRINOGEN: CPT

## 2017-09-05 RX ADMIN — GABAPENTIN 300 MILLIGRAM(S): 400 CAPSULE ORAL at 10:35

## 2017-09-05 RX ADMIN — PALIPERIDONE 9 MILLIGRAM(S): 1.5 TABLET, EXTENDED RELEASE ORAL at 10:36

## 2017-09-05 RX ADMIN — Medication 1 PATCH: at 10:35

## 2017-09-05 RX ADMIN — Medication 1 PATCH: at 10:39

## 2017-09-05 RX ADMIN — SERTRALINE 100 MILLIGRAM(S): 25 TABLET, FILM COATED ORAL at 10:35

## 2017-09-05 NOTE — PROGRESS NOTE BEHAVIORAL HEALTH - NSBHFUPMEDSE_PSY_A_CORE
None known

## 2017-09-05 NOTE — PROGRESS NOTE BEHAVIORAL HEALTH - NSBHADMITIPBHPROVIDER_PSY_A_CORE
no/writer knows patient from last admission
no
writer knows patient from last admission/no
no/writer knows patient from last admission
no
no/writer knows patient from last admission
writer knows patient from last admission/no

## 2017-09-05 NOTE — PROGRESS NOTE BEHAVIORAL HEALTH - ADDITIONAL DETAILS / COMMENTS
Despite persistent severe depression, pt appears less sedated and more alert. Blunted affect. Not interested in interview. Frequently responds 'yeah right' when offered help.
Endorsing better mood. No longer indicates CAH to jump in front of train.
No longer endorsing depressed mood or indicating CAH to jump in front of train.
No longer endorsing depressed mood or indicating CAH to jump in front of train.  Poor eye contact.
Despite persistent severe depression, pt appears less sedated and more alert. Blunted affect. Not interested in interview. Frequently responds 'yeah right' when offered help.
Endorsing better mood. Euthymic affect. No longer indicates CAH to jump in front of train.
Endorsing better mood. Euthymic affect. No longer indicates CAH to jump in front of train.
Endorsing better mood. No longer indicates CAH to jump in front of train.
No longer endorsing depressed mood or indicating CAH to jump in front of train.
No longer endorsing depressed mood or indicating CAH to jump in front of train.  Poor eye contact.
less sedated and more alert. Blunted affect.
Poor eye contact w/ blunted affect and thought blocking. Reporting better mood, which is signifcant as this is his first expressed improvement since admission.
Persistent poor eye contact w/ blunted affect and thought blocking.  Mood improving slightly and auditory hallucinations somewhat diminished.
Poor eye contact w/ blunted affect and thought blocking.  Pt showing improved mood, somewhat brighter, sharing more from his personal life.
Poor eye contact w/ blunted affect and thought blocking. Endorsing slightly better mood with Zoloft.
Reports AH w/ suicidal ideation but incongruent with observed affect.  Persistent poor eye contact w/ blunted affect and thought blocking.  Mood improving slightly and auditory hallucinations somewhat diminished.
Persistent poor eye contact w/ blunted affect and thought blocking.  Mood improving slightly and auditory hallucinations somewhat diminished.
In bed, does not maintain eye contact, yawns occasionally, becomes somewhat hostile when methadone not offered.

## 2017-09-05 NOTE — PROGRESS NOTE BEHAVIORAL HEALTH - NSBHFUPINTERVALHXFT_PSY_A_CORE
No behavioral complaints by week end or night staff.  Awaiting rehab program.  Not as cheerful as in recent days but still as guarded and preoccupied.

## 2017-09-05 NOTE — PROGRESS NOTE BEHAVIORAL HEALTH - NS ED BHA REVIEW OF ED CHART AVAILABLE IMAGING REVIEWED
None available

## 2017-09-05 NOTE — PROGRESS NOTE BEHAVIORAL HEALTH - NSBHFUPTYPE_PSY_A_CORE
Inpatient

## 2017-09-05 NOTE — PROGRESS NOTE BEHAVIORAL HEALTH - NSBHADMITIPREASONDETAILS_PSY_A_CORE FT
transitioning to aftercare - if available a rehab facility
transitioning to rehab facility
transitioning to rehab facility

## 2017-09-05 NOTE — PROGRESS NOTE BEHAVIORAL HEALTH - NSBHCHARTREVIEWVS_PSY_A_CORE FT
Vital Signs Last 24 Hrs  T(C): 36.9 (01 Sep 2017 09:14), Max: 37.2 (31 Aug 2017 17:00)  T(F): 98.4 (01 Sep 2017 09:14), Max: 98.9 (31 Aug 2017 17:00)  HR: 80 (01 Sep 2017 09:14) (80 - 81)  BP: 118/73 (01 Sep 2017 09:14) (118/73 - 138/87)  BP(mean): --  RR: 18 (01 Sep 2017 09:14) (18 - 18)  SpO2: --
Vital Signs Last 24 Hrs  T(C): 37.2 (27 Aug 2017 17:00), Max: 37.2 (27 Aug 2017 17:00)  T(F): 99 (27 Aug 2017 17:00), Max: 99 (27 Aug 2017 17:00)  HR: 87 (27 Aug 2017 17:00) (87 - 87)  BP: 129/89 (27 Aug 2017 17:00) (129/89 - 129/89)Vital Signs Last 24 Hrs  T(C): 37.2 (29 Aug 2017 17:00), Max: 37.2 (29 Aug 2017 17:00)  T(F): 98.9 (29 Aug 2017 17:00), Max: 98.9 (29 Aug 2017 17:00)  HR: 101 (29 Aug 2017 17:00) (101 - 101)  BP: 116/81 (29 Aug 2017 17:00) (116/81 - 116/81)  BP(mean): --  RR: 18 (29 Aug 2017 17:00) (18 - 18)  SpO2: --  BP(mean): --  RR: 18 (27 Aug 2017 17:00) (18 - 18)  SpO2: --
Vital Signs Last 24 Hrs  T(C): 37.2 (29 Aug 2017 17:00), Max: 37.2 (29 Aug 2017 17:00)  T(F): 98.9 (29 Aug 2017 17:00), Max: 98.9 (29 Aug 2017 17:00)  HR: 101 (29 Aug 2017 17:00) (101 - 101)  BP: 116/81 (29 Aug 2017 17:00) (116/81 - 116/81)  BP(mean): --  RR: 18 (29 Aug 2017 17:00) (18 - 18)  SpO2: --
Vital Signs Last 24 Hrs  T(C): 37.3 (30 Aug 2017 17:44), Max: 37.3 (30 Aug 2017 17:44)  T(F): 99.1 (30 Aug 2017 17:44), Max: 99.1 (30 Aug 2017 17:44)  HR: 77 (30 Aug 2017 17:44) (77 - 97)  BP: 125/83 (30 Aug 2017 17:44) (125/78 - 125/83)  BP(mean): --  RR: 16 (30 Aug 2017 10:00) (16 - 16)  SpO2: --
Vital Signs Last 24 Hrs  T(C): 37.4 (04 Sep 2017 17:05), Max: 37.4 (04 Sep 2017 17:05)  T(F): 99.4 (04 Sep 2017 17:05), Max: 99.4 (04 Sep 2017 17:05)  HR: 94 (04 Sep 2017 17:05) (94 - 99)  BP: 110/80 (04 Sep 2017 17:05) (110/80 - 126/83)  BP(mean): --  RR: 18 (04 Sep 2017 17:05) (18 - 19)  SpO2: --
Vital Signs Last 24 Hrs  T(C): 36.9 (23 Aug 2017 16:19), Max: 36.9 (23 Aug 2017 09:34)  T(F): 98.5 (23 Aug 2017 16:19), Max: 98.5 (23 Aug 2017 09:34)  HR: 86 (23 Aug 2017 16:19) (86 - 97)  BP: 136/87 (23 Aug 2017 16:19) (134/87 - 136/87)  BP(mean): --  RR: 18 (23 Aug 2017 16:19) (17 - 18)  SpO2: --
Vital Signs Last 24 Hrs  T(C): 37.1 (24 Aug 2017 16:23), Max: 37.1 (24 Aug 2017 16:23)  T(F): 98.8 (24 Aug 2017 16:23), Max: 98.8 (24 Aug 2017 16:23)  HR: 109 (24 Aug 2017 16:23) (99 - 109)  BP: 130/86 (24 Aug 2017 16:23) (128/92 - 130/86)  BP(mean): --  RR: 20 (24 Aug 2017 16:23) (17 - 20)  SpO2: --
Vital Signs Last 24 Hrs  T(C): 37.2 (27 Aug 2017 17:00), Max: 37.2 (27 Aug 2017 17:00)  T(F): 99 (27 Aug 2017 17:00), Max: 99 (27 Aug 2017 17:00)  HR: 87 (27 Aug 2017 17:00) (87 - 87)  BP: 129/89 (27 Aug 2017 17:00) (129/89 - 129/89)  BP(mean): --  RR: 18 (27 Aug 2017 17:00) (18 - 18)  SpO2: --
Vital Signs Last 24 Hrs  T(C): 36.9 (23 Aug 2017 09:34), Max: 37.1 (22 Aug 2017 17:00)  T(F): 98.5 (23 Aug 2017 09:34), Max: 98.8 (22 Aug 2017 17:00)  HR: 97 (23 Aug 2017 09:34) (81 - 97)  BP: 134/87 (23 Aug 2017 09:34) (121/84 - 134/87)  BP(mean): --  RR: 17 (23 Aug 2017 09:34) (17 - 18)  SpO2: --
Vital Signs Last 24 Hrs  T(C): 37.3 (13 Aug 2017 21:00), Max: 37.3 (13 Aug 2017 21:00)  T(F): 99.1 (13 Aug 2017 21:00), Max: 99.1 (13 Aug 2017 21:00)  HR: 87 (13 Aug 2017 21:00) (83 - 87)  BP: 129/77 (13 Aug 2017 21:00) (129/77 - 144/91)  BP(mean): --  RR: 18 (13 Aug 2017 21:00) (18 - 18)  SpO2: --
Vital Signs Last 24 Hrs  T(C): 36.1 (10 Aug 2017 09:49), Max: 37 (10 Aug 2017 06:08)  T(F): 97 (10 Aug 2017 09:49), Max: 98.6 (10 Aug 2017 06:08)  HR: 90 (10 Aug 2017 09:49) (74 - 90)  BP: 124/91 (10 Aug 2017 09:49) (101/64 - 128/87)  BP(mean): --  RR: 18 (10 Aug 2017 09:49) (18 - 18)  SpO2: --
Vital Signs Last 24 Hrs  T(C): 36.7 (15 Aug 2017 08:42), Max: 37.2 (14 Aug 2017 17:00)  T(F): 98 (15 Aug 2017 08:42), Max: 98.9 (14 Aug 2017 17:00)  HR: 78 (15 Aug 2017 08:42) (78 - 88)  BP: 127/85 (15 Aug 2017 08:42) (127/85 - 140/86)  BP(mean): --  RR: 20 (15 Aug 2017 08:42) (18 - 20)  SpO2: --
Vital Signs Last 24 Hrs  T(C): 36.8 (18 Aug 2017 09:37), Max: 36.8 (17 Aug 2017 17:00)  T(F): 98.3 (18 Aug 2017 09:37), Max: 98.3 (18 Aug 2017 09:37)  HR: 89 (18 Aug 2017 09:37) (85 - 89)  BP: 125/89 (18 Aug 2017 09:37) (109/71 - 125/89)  BP(mean): --  RR: 20 (18 Aug 2017 09:37) (18 - 20)  SpO2: --
Vital Signs Last 24 Hrs  T(C): 37.1 (17 Aug 2017 10:00), Max: 37.1 (16 Aug 2017 16:05)  T(F): 98.8 (17 Aug 2017 10:00), Max: 98.8 (16 Aug 2017 16:05)  HR: 103 (17 Aug 2017 10:00) (103 - 114)  BP: 115/79 (17 Aug 2017 10:00) (114/94 - 115/79)  BP(mean): --  RR: 18 (17 Aug 2017 10:00) (18 - 18)  SpO2: --
Vital Signs Last 24 Hrs  T(C): 37.1 (22 Aug 2017 17:00), Max: 37.1 (22 Aug 2017 09:03)  T(F): 98.8 (22 Aug 2017 17:00), Max: 98.8 (22 Aug 2017 09:03)  HR: 81 (22 Aug 2017 17:00) (81 - 106)  BP: 121/84 (22 Aug 2017 17:00) (116/77 - 121/84)  BP(mean): --  RR: 18 (22 Aug 2017 17:00) (18 - 20)  SpO2: --
Vital Signs Last 24 Hrs  T(C): 36.9 (16 Aug 2017 10:00), Max: 37.1 (15 Aug 2017 17:00)  T(F): 98.4 (16 Aug 2017 10:00), Max: 98.8 (15 Aug 2017 17:00)  HR: 106 (16 Aug 2017 10:00) (99 - 106)  BP: 117/78 (16 Aug 2017 10:00) (117/78 - 129/90)  BP(mean): --  RR: 18 (16 Aug 2017 10:00) (18 - 18)  SpO2: --
Vital Signs Last 24 Hrs  T(C): 37 (11 Aug 2017 06:20), Max: 37.2 (10 Aug 2017 20:31)  T(F): 98.6 (11 Aug 2017 06:20), Max: 99 (10 Aug 2017 20:31)  HR: 71 (11 Aug 2017 06:20) (71 - 90)  BP: 134/80 (11 Aug 2017 06:20) (121/80 - 144/89)  BP(mean): --  RR: 18 (11 Aug 2017 06:20) (18 - 18)  SpO2: --
Vital Signs Last 24 Hrs  T(C): 36.6 (21 Aug 2017 09:00), Max: 37.2 (20 Aug 2017 16:00)  T(F): 97.9 (21 Aug 2017 09:00), Max: 99 (20 Aug 2017 16:00)  HR: 97 (21 Aug 2017 09:00) (83 - 97)  BP: 122/85 (21 Aug 2017 09:00) (111/82 - 122/85)  BP(mean): --  RR: 16 (21 Aug 2017 09:00) (16 - 18)  SpO2: --
Vital Signs Last 24 Hrs  T(C): 36.9 (09 Aug 2017 06:00), Max: 37 (08 Aug 2017 10:38)  T(F): 98.5 (09 Aug 2017 06:00), Max: 98.6 (08 Aug 2017 10:38)  HR: 72 (09 Aug 2017 06:00) (72 - 77)  BP: 116/68 (09 Aug 2017 06:00) (114/76 - 125/84)  BP(mean): --  RR: 18 (09 Aug 2017 06:00) (18 - 18)  SpO2: --

## 2017-09-05 NOTE — PROGRESS NOTE BEHAVIORAL HEALTH - NSBHADMITIPOBSFT_PSY_A_CORE
would engage staff; not threatening harm in the hospital.
would engage staff; no longer endorsing SI
would engage staff; no longer endorsing SI  no indication for 1:1 at this time
would engage staff; no longer endorsing SI
would engage staff; not threatening harm in the hospital.
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
## 2017-09-05 NOTE — PROGRESS NOTE BEHAVIORAL HEALTH - SECONDARY DX1
Cannabis use disorder, moderate, dependence

## 2017-09-05 NOTE — PROGRESS NOTE BEHAVIORAL HEALTH - PERCEPTIONS
No abnormalities
Auditory hallucinations
No abnormalities
Auditory hallucinations

## 2017-09-05 NOTE — PROGRESS NOTE BEHAVIORAL HEALTH - PROBLEM SELECTOR PROBLEM 2
Polysubstance abuse

## 2017-09-05 NOTE — PROGRESS NOTE BEHAVIORAL HEALTH - THOUGHT PROCESS
Linear
Other

## 2017-09-05 NOTE — PROGRESS NOTE BEHAVIORAL HEALTH - PRIMARY DX
Schizoaffective disorder, unspecified type

## 2017-09-05 NOTE — PROGRESS NOTE BEHAVIORAL HEALTH - PROBLEM SELECTOR PLAN 1
JUSTIN Sustenna: loading dose 234 mg received on 08/31 to be followed with 156 mg within one week  Continuing Invega po 9 mg for one week from Sustenna loading dose and then may gradually taper down   Continue Zoloft 100 mg po to address depressive symptoms  I/G/M therapy

## 2017-09-05 NOTE — PROGRESS NOTE BEHAVIORAL HEALTH - NSBHCONSORIP_PSY_A_CORE
Inpatient Admission...

## 2017-09-05 NOTE — PROGRESS NOTE BEHAVIORAL HEALTH - NSBHLOC_PSY_A_CORE
Alert

## 2017-09-05 NOTE — PROGRESS NOTE BEHAVIORAL HEALTH - NS ED BHA AXIS I SECONDARY1 CODE FT
F12.20

## 2017-09-05 NOTE — PROGRESS NOTE BEHAVIORAL HEALTH - ESTIMATED DISCHARGE DATE
29-Aug-2017
01-Sep-2017
12-Sep-2017
29-Aug-2017
01-Sep-2017
29-Aug-2017
28-Aug-2017
29-Aug-2017

## 2017-09-05 NOTE — PROGRESS NOTE BEHAVIORAL HEALTH - NSBHFUPSUICINTERVAL_PSY_A_CORE
none known
yes
none known
yes

## 2017-09-05 NOTE — PROGRESS NOTE BEHAVIORAL HEALTH - ATTENTION / CONCENTRATION
Impaired

## 2017-09-05 NOTE — PROGRESS NOTE BEHAVIORAL HEALTH - NSBHADMITIPREASON_PSY_A_CORE
Danger to self; mental illness expected to respond to inpatient care
other reason
Danger to self; mental illness expected to respond to inpatient care

## 2017-09-05 NOTE — PROGRESS NOTE BEHAVIORAL HEALTH - BODY HABITUS
Other

## 2017-09-05 NOTE — PROGRESS NOTE BEHAVIORAL HEALTH - NS ED BHA AXIS I PRIMARY CODE FT
F25.9

## 2017-09-05 NOTE — PROGRESS NOTE BEHAVIORAL HEALTH - SUMMARY
51 year old man domiciled in shelter, history of schizoaffective disorder, many PPH, substance abuse, chronic non compliance, 1 serious SA OD, with SI and AH in context of recent drug use but also continued lack of structure and medications. Pt has responded well to Invega and Zoloft but was endorsing continued CAH to jump in front of train until recently. There was some indication of secondary gain as patient's euthymic affect, lack of internal stimuli, and social interaction on unit belied his stated suicidality/AH. Today patient continues to report no CAH and good mood and is future oriented toward possible rehab placement. Patient has agreed to ZAMORA Sustenna, which is indicated given patient's history of non-compliance once discharged, and a loading dose 234 mg was given on 08/31. Follow up dose 156 mg within 4 to 7 days. Continuing po Invega for 1 week and then may gradually taper down. Transitioning to aftercare planning, to a rehab facility if pt is accepted.    ;; 9/5: no AH or SI; constricted and guarded; not as cheerful as last week.  Significant psychomotor retardation.  Sleep ok.  Awaiting rehab.

## 2017-09-05 NOTE — PROGRESS NOTE BEHAVIORAL HEALTH - NSBHCHARTREVIEWINVESTIGATE_PSY_A_CORE FT
08/09 EKG  NSR w/ qtc 416

## 2017-09-05 NOTE — PROGRESS NOTE BEHAVIORAL HEALTH - PROBLEM SELECTOR PROBLEM 1
Schizoaffective disorder, bipolar type

## 2017-09-05 NOTE — PROGRESS NOTE BEHAVIORAL HEALTH - NSBHATTESTSEENBY_PSY_A_CORE
Attending Psychiatrist supervising NP/Trainee, meeting pt...
attending Psychiatrist without NP/Trainee
attending Psychiatrist without NP/Trainee
Attending Psychiatrist supervising NP/Trainee, meeting pt...
attending Psychiatrist without NP/Trainee
Attending Psychiatrist supervising NP/Trainee, meeting pt...
attending Psychiatrist without NP/Trainee
Attending Psychiatrist supervising NP/Trainee, meeting pt...

## 2017-09-05 NOTE — PROGRESS NOTE BEHAVIORAL HEALTH - ESTIMATED INTELLIGENCE
Below Average

## 2017-09-07 DIAGNOSIS — R45.851 SUICIDAL IDEATIONS: ICD-10-CM

## 2017-09-07 DIAGNOSIS — F14.129 COCAINE ABUSE WITH INTOXICATION, UNSPECIFIED: ICD-10-CM

## 2017-09-07 DIAGNOSIS — F12.129 CANNABIS ABUSE WITH INTOXICATION, UNSPECIFIED: ICD-10-CM

## 2017-09-07 DIAGNOSIS — F41.9 ANXIETY DISORDER, UNSPECIFIED: ICD-10-CM

## 2017-09-07 DIAGNOSIS — Z91.14 PATIENT'S OTHER NONCOMPLIANCE WITH MEDICATION REGIMEN: ICD-10-CM

## 2017-09-07 DIAGNOSIS — M54.9 DORSALGIA, UNSPECIFIED: ICD-10-CM

## 2017-09-07 DIAGNOSIS — F11.129 OPIOID ABUSE WITH INTOXICATION, UNSPECIFIED: ICD-10-CM

## 2017-09-07 DIAGNOSIS — F25.9 SCHIZOAFFECTIVE DISORDER, UNSPECIFIED: ICD-10-CM

## 2017-09-07 DIAGNOSIS — F12.20 CANNABIS DEPENDENCE, UNCOMPLICATED: ICD-10-CM

## 2017-09-07 DIAGNOSIS — Z59.0 HOMELESSNESS: ICD-10-CM

## 2017-09-07 DIAGNOSIS — Z91.5 PERSONAL HISTORY OF SELF-HARM: ICD-10-CM

## 2017-09-07 DIAGNOSIS — Z56.0 UNEMPLOYMENT, UNSPECIFIED: ICD-10-CM

## 2017-09-07 SDOH — ECONOMIC STABILITY - HOUSING INSECURITY: HOMELESSNESS: Z59.0

## 2017-09-07 SDOH — ECONOMIC STABILITY - INCOME SECURITY: UNEMPLOYMENT, UNSPECIFIED: Z56.0

## 2017-12-18 PROCEDURE — 82746 ASSAY OF FOLIC ACID SERUM: CPT

## 2017-12-18 PROCEDURE — 80053 COMPREHEN METABOLIC PANEL: CPT

## 2017-12-18 PROCEDURE — 81001 URINALYSIS AUTO W/SCOPE: CPT

## 2017-12-18 PROCEDURE — 80061 LIPID PANEL: CPT

## 2017-12-18 PROCEDURE — 93005 ELECTROCARDIOGRAM TRACING: CPT

## 2017-12-18 PROCEDURE — 99285 EMERGENCY DEPT VISIT HI MDM: CPT | Mod: 25

## 2017-12-18 PROCEDURE — 85025 COMPLETE CBC W/AUTO DIFF WBC: CPT

## 2017-12-18 PROCEDURE — 84443 ASSAY THYROID STIM HORMONE: CPT

## 2017-12-18 PROCEDURE — 80307 DRUG TEST PRSMV CHEM ANLYZR: CPT

## 2017-12-18 PROCEDURE — 80164 ASSAY DIPROPYLACETIC ACD TOT: CPT

## 2017-12-18 PROCEDURE — 83036 HEMOGLOBIN GLYCOSYLATED A1C: CPT

## 2017-12-18 PROCEDURE — 36415 COLL VENOUS BLD VENIPUNCTURE: CPT

## 2017-12-18 PROCEDURE — 82306 VITAMIN D 25 HYDROXY: CPT

## 2017-12-18 PROCEDURE — 82607 VITAMIN B-12: CPT

## 2018-01-15 ENCOUNTER — INPATIENT (INPATIENT)
Facility: HOSPITAL | Age: 53
LOS: 16 days | Discharge: HOME | DRG: 885 | End: 2018-02-01
Attending: PSYCHIATRY & NEUROLOGY | Admitting: PSYCHIATRY & NEUROLOGY
Payer: MEDICAID

## 2018-01-15 VITALS
DIASTOLIC BLOOD PRESSURE: 64 MMHG | TEMPERATURE: 97 F | OXYGEN SATURATION: 97 % | HEART RATE: 108 BPM | SYSTOLIC BLOOD PRESSURE: 123 MMHG | RESPIRATION RATE: 16 BRPM

## 2018-01-15 DIAGNOSIS — F32.9 MAJOR DEPRESSIVE DISORDER, SINGLE EPISODE, UNSPECIFIED: ICD-10-CM

## 2018-01-15 LAB
ANION GAP SERPL CALC-SCNC: 13 MMOL/L — SIGNIFICANT CHANGE UP (ref 5–17)
APAP SERPL-MCNC: <15 UG/ML — SIGNIFICANT CHANGE UP (ref 10–30)
APPEARANCE UR: CLEAR — SIGNIFICANT CHANGE UP
BASOPHILS NFR BLD AUTO: 0.2 % — SIGNIFICANT CHANGE UP (ref 0–2)
BILIRUB UR-MCNC: (no result)
BUN SERPL-MCNC: 22 MG/DL — SIGNIFICANT CHANGE UP (ref 7–23)
CALCIUM SERPL-MCNC: 9.4 MG/DL — SIGNIFICANT CHANGE UP (ref 8.4–10.5)
CHLORIDE SERPL-SCNC: 100 MMOL/L — SIGNIFICANT CHANGE UP (ref 96–108)
CO2 SERPL-SCNC: 26 MMOL/L — SIGNIFICANT CHANGE UP (ref 22–31)
COLOR SPEC: YELLOW — SIGNIFICANT CHANGE UP
CREAT SERPL-MCNC: 1.02 MG/DL — SIGNIFICANT CHANGE UP (ref 0.5–1.3)
DIFF PNL FLD: (no result)
EOSINOPHIL NFR BLD AUTO: 1 % — SIGNIFICANT CHANGE UP (ref 0–6)
ETHANOL SERPL-MCNC: <10 MG/DL — SIGNIFICANT CHANGE UP (ref 0–10)
GLUCOSE SERPL-MCNC: 135 MG/DL — HIGH (ref 70–99)
GLUCOSE UR QL: NEGATIVE — SIGNIFICANT CHANGE UP
HCT VFR BLD CALC: 32.9 % — LOW (ref 39–50)
HGB BLD-MCNC: 11 G/DL — LOW (ref 13–17)
KETONES UR-MCNC: 40 MG/DL
LEUKOCYTE ESTERASE UR-ACNC: NEGATIVE — SIGNIFICANT CHANGE UP
LYMPHOCYTES # BLD AUTO: 12.6 % — LOW (ref 13–44)
MCHC RBC-ENTMCNC: 28.4 PG — SIGNIFICANT CHANGE UP (ref 27–34)
MCHC RBC-ENTMCNC: 33.4 G/DL — SIGNIFICANT CHANGE UP (ref 32–36)
MCV RBC AUTO: 85 FL — SIGNIFICANT CHANGE UP (ref 80–100)
MONOCYTES NFR BLD AUTO: 14.9 % — HIGH (ref 2–14)
NEUTROPHILS NFR BLD AUTO: 71.3 % — SIGNIFICANT CHANGE UP (ref 43–77)
NITRITE UR-MCNC: NEGATIVE — SIGNIFICANT CHANGE UP
PCP SPEC-MCNC: SIGNIFICANT CHANGE UP
PH UR: 6.5 — SIGNIFICANT CHANGE UP (ref 5–8)
PLATELET # BLD AUTO: 237 K/UL — SIGNIFICANT CHANGE UP (ref 150–400)
POTASSIUM SERPL-MCNC: 4 MMOL/L — SIGNIFICANT CHANGE UP (ref 3.5–5.3)
POTASSIUM SERPL-SCNC: 4 MMOL/L — SIGNIFICANT CHANGE UP (ref 3.5–5.3)
PROT UR-MCNC: (no result) MG/DL
RBC # BLD: 3.87 M/UL — LOW (ref 4.2–5.8)
RBC # FLD: 14.1 % — SIGNIFICANT CHANGE UP (ref 10.3–16.9)
SALICYLATES SERPL-MCNC: <0.3 MG/DL — LOW (ref 2.8–20)
SODIUM SERPL-SCNC: 139 MMOL/L — SIGNIFICANT CHANGE UP (ref 135–145)
SP GR SPEC: 1.02 — SIGNIFICANT CHANGE UP (ref 1–1.03)
UROBILINOGEN FLD QL: 0.2 E.U./DL — SIGNIFICANT CHANGE UP
WBC # BLD: 10.3 K/UL — SIGNIFICANT CHANGE UP (ref 3.8–10.5)
WBC # FLD AUTO: 10.3 K/UL — SIGNIFICANT CHANGE UP (ref 3.8–10.5)

## 2018-01-15 PROCEDURE — 93010 ELECTROCARDIOGRAM REPORT: CPT

## 2018-01-15 PROCEDURE — 70450 CT HEAD/BRAIN W/O DYE: CPT | Mod: 26

## 2018-01-15 PROCEDURE — 99285 EMERGENCY DEPT VISIT HI MDM: CPT | Mod: 25

## 2018-01-15 RX ORDER — TRAZODONE HCL 50 MG
50 TABLET ORAL AT BEDTIME
Qty: 0 | Refills: 0 | Status: DISCONTINUED | OUTPATIENT
Start: 2018-01-15 | End: 2018-02-01

## 2018-01-15 RX ORDER — HALOPERIDOL DECANOATE 100 MG/ML
5 INJECTION INTRAMUSCULAR EVERY 4 HOURS
Qty: 0 | Refills: 0 | Status: DISCONTINUED | OUTPATIENT
Start: 2018-01-15 | End: 2018-02-01

## 2018-01-15 RX ORDER — DIPHENHYDRAMINE HCL 50 MG
50 CAPSULE ORAL EVERY 4 HOURS
Qty: 0 | Refills: 0 | Status: DISCONTINUED | OUTPATIENT
Start: 2018-01-15 | End: 2018-02-01

## 2018-01-15 RX ORDER — ACETAMINOPHEN 500 MG
650 TABLET ORAL EVERY 6 HOURS
Qty: 0 | Refills: 0 | Status: DISCONTINUED | OUTPATIENT
Start: 2018-01-15 | End: 2018-02-01

## 2018-01-15 NOTE — ED PROVIDER NOTE - PHYSICAL EXAMINATION
CONSTITUTIONAL: dirty appearing, NAD, no signs of trauma  HEAD: Normocephalic; atraumatic.   EYES: PERRL; EOM intact; conjunctiva and sclera clear  ENT: normal nose; no rhinorrhea; normal pharynx with no erythema or lesions.   NECK: Supple; non-tender; no LAD  CARDIOVASCULAR: Normal S1, S2; no murmurs, rubs, or gallops. Regular rate and rhythm.   RESPIRATORY: Breathing easily; breath sounds clear and equal bilaterally; no wheezes, rhonchi, or rales.  GI: Soft; non-distended; non-tender; no palpable organomegaly.   MSK: FROM at all extremities, normal tone   EXT: No cyanosis or edema; N/V intact  SKIN: Normal for age and race; warm; dry; good turgor; no apparent lesions or rash.   NEURO: A & O x 3; face symmetric; grossly unremarkable.

## 2018-01-15 NOTE — ED PROVIDER NOTE - OBJECTIVE STATEMENT
53 yo M with pmh of schizoaffective d/o, previous suicide attempts, polysubstance abuse c/o syncope this morning. Pt states he snorted a bag of heroine and then went outside and passed out. Does not think he hit his head. Pt states he hasn't done heroin in a while. Admits to cocaine yesterday. Also c/o SI. States he wants to jump in front of a train. Denies taking the heroin as a suicide attempt. Also reports mild HA. Denies fever, chills, cp, sob, n/v.

## 2018-01-15 NOTE — ED ADULT NURSE NOTE - OBJECTIVE STATEMENT
pt presents to ED A&Ox3 stating he sniffed a bag of heroin and passed out. pt states he thinks he hit his head, was found by bystanders. pt also reports suicidal thoughts stating "I want to jump in front of a train." denies hi. denies cp/sob, n/v/d, headache, dizziness, vision changes, fever. pt also reports using cocaine yesterday. belongings secured at nurse's station.

## 2018-01-15 NOTE — ED ADULT NURSE NOTE - [facility]PI ED SYMPTOMS NEG
no change in level of consciousness/no hallucinations/no confusion/no weakness/no weight loss/no paranoia/no disorientation/no agitation/no homicidal

## 2018-01-15 NOTE — ED BEHAVIORAL HEALTH ASSESSMENT NOTE - AXIS IV
Problems with interaction with legal system/Problem related to social environment/Housing problems/Economic problems

## 2018-01-15 NOTE — ED BEHAVIORAL HEALTH ASSESSMENT NOTE - HPI (INCLUDE ILLNESS QUALITY, SEVERITY, DURATION, TIMING, CONTEXT, MODIFYING FACTORS, ASSOCIATED SIGNS AND SYMPTOMS)
This is a 52 year old man with a PPHx of heroin and cocaine use disorders and self reported Bipolar most recent episode manic, who was BIB self due to overdose on heroin. The patient is an unreliable historian but per ED provider states he took heroin and became suicidal. Patient stated to ED provider he was 'schizoaffective' and had plans to jump off a building. On interview, the patient states that he is Bipolar and takes Neuorontin monotherapy. He states that last night he snorted a bag of heroin and then 'woke up in the hospital.' He states that this was not a relapse, but that he has been using for several months continuously since being released from an 18 month [facility] term. The patient also reports using cocaine and alcohol yesterday. The patient is unable to provide any collateral information as he states he has no friends or family, and [place], panhandling for money to purchase more drugs. The patient reports that a month prior, he had a manic episode that he was hospitalized for and that he generally feels good and not suicidal. The patient cannot provide the name of his most recent psychiatrist or the hospital he was discharged from. The patient endorses SI, without plan at this time, but also expresses future orientation stating that he wants to get into a residential treatment facility. The patient denies any HI/PI/AVH. He denies recent self injurious behavior but states he has "hurt those who wanna hurt me" in the past. The patient denies manic or psychotic symptoms. Of note, the patient reports EtOH withdrawal symptoms, including a history of seizures from alcohol, the most recent being yesterday, though he also admits to having drank two beers yesterday and does not objectively appear to be in active withdrawal during interview. The patient

## 2018-01-15 NOTE — ED BEHAVIORAL HEALTH ASSESSMENT NOTE - SUMMARY
52 year old man with PPHx of Heroin / Cocaine / EtOH use disorders and Bipolar vs schizoaffective disorder most recent episode depressed presents to ED BIB self for SI in the setting of heroin and cocaine use as well as medication noncompliance. Patient with history most consistent with antisocial personality disorder, but presents in crisis following acute intoxication. Secondary gain possible given pt's future oriented narrative (wants help with housing). However, given the episode, patient is a danger to self given ongoing substance use and would benefit from inpatient admission to monitor for EtOH withdrawal and ongoing suicidality.

## 2018-01-15 NOTE — ED PROVIDER NOTE - MEDICAL DECISION MAKING DETAILS
51 yo M with pmh of schizoaffective d/o, previous suicide attempts, polysubstance abuse c/o syncope this morning after snorting heroin. Now AAOx3. Exam unremarkable. Pt also c/o SI (previous attempts and admissions to [facility] in the past). Labs, head CT, psyc eval.

## 2018-01-15 NOTE — ED BEHAVIORAL HEALTH ASSESSMENT NOTE - RISK ASSESSMENT
The patient is a chronic risk of self harm given transient living situation, ongoing substance use and non adherence to treatment. Acute factors include overdose on heroin

## 2018-01-15 NOTE — ED BEHAVIORAL HEALTH ASSESSMENT NOTE - DIFFERENTIAL
opiate induced depressive disorder, stimulant induced depressive disorder, bipolar most recent episode depressed severe without psychotic features, schizoaffective disorder, MDD recurrent severe

## 2018-01-15 NOTE — ED PROVIDER NOTE - ATTENDING CONTRIBUTION TO CARE
I have evaluated the pt and reviewed all pertinent clinical data, and I agree with the documentation/care/plan executed by ALEX Snatos.

## 2018-01-15 NOTE — ED BEHAVIORAL HEALTH ASSESSMENT NOTE - DESCRIPTION
Patient seen in ED for syncopal episode; CT head negative. Pt with SI and recent heroin use, but awake and alert for initial workup. unremarkable [place]

## 2018-01-15 NOTE — ED BEHAVIORAL HEALTH ASSESSMENT NOTE - DETAILS
seen by inpatient psych MD in ED Patient states he was recently discharged within a month but cannot provide name of hospital not available pt guarded Pt with previous SA, unclear etiology last yesterday ALEX Gillespie unknown

## 2018-01-16 DIAGNOSIS — F25.0 SCHIZOAFFECTIVE DISORDER, BIPOLAR TYPE: ICD-10-CM

## 2018-01-16 PROCEDURE — 99223 1ST HOSP IP/OBS HIGH 75: CPT

## 2018-01-16 RX ORDER — PALIPERIDONE 1.5 MG/1
3 TABLET, EXTENDED RELEASE ORAL DAILY
Qty: 0 | Refills: 0 | Status: DISCONTINUED | OUTPATIENT
Start: 2018-01-16 | End: 2018-01-17

## 2018-01-16 RX ADMIN — Medication 50 MILLIGRAM(S): at 21:49

## 2018-01-16 RX ADMIN — Medication 2 MILLIGRAM(S): at 06:31

## 2018-01-16 RX ADMIN — HALOPERIDOL DECANOATE 5 MILLIGRAM(S): 100 INJECTION INTRAMUSCULAR at 06:30

## 2018-01-16 RX ADMIN — Medication 650 MILLIGRAM(S): at 21:49

## 2018-01-16 NOTE — BEHAVIORAL HEALTH ASSESSMENT NOTE - DETAILS
pt guarded Patient states he was recently discharged within a month but cannot provide name of hospital unknown not available Pt with previous SA, unclear etiology last yesterday

## 2018-01-16 NOTE — BEHAVIORAL HEALTH ASSESSMENT NOTE - HPI (INCLUDE ILLNESS QUALITY, SEVERITY, DURATION, TIMING, CONTEXT, MODIFYING FACTORS, ASSOCIATED SIGNS AND SYMPTOMS)
This is a 52 year old man with a PPHx of heroin and cocaine use disorders and self reported Bipolar most recent episode manic, who was BIB self due to overdose on heroin. The patient is an unreliable historian but per ED provider states he took heroin and became suicidal. Patient stated to ED provider he was 'schizoaffective' and had plans to jump off a building. On interview, the patient states that he is Bipolar and takes Neuorontin monotherapy. He states that last night he snorted a bag of heroin and then 'woke up in the hospital.' He states that this was not a relapse, but that he has been using for several months continuously since being released from an 18 month [facility] term. The patient also reports using cocaine and alcohol yesterday. The patient is unable to provide any collateral information as he states he has no friends or family, and [place], panhandling for money to purchase more drugs. The patient reports that a month prior, he had a manic episode that he was hospitalized for and that he generally feels good and not suicidal. The patient cannot provide the name of his most recent psychiatrist or the hospital he was discharged from. The patient endorses SI, without plan at this time, but also expresses future orientation stating that he wants to get into a residential treatment facility. The patient denies any HI/PI/AVH. He denies recent self injurious behavior but states he has "hurt those who wanna hurt me" in the past. The patient denies manic or psychotic symptoms. Of note, the patient reports EtOH withdrawal symptoms, including a history of seizures from alcohol, the most recent being yesterday, though he also admits to having drank two beers yesterday and does not objectively appear to be in active withdrawal during interview    ;;1/16:  familiar with this patient from previous inpatient encounters.  Patient is alert x3;  oriented; knows the month and year but not  day; knows who is President;  walks with walker; avoids eye contact; not able to cooperate with a neuro screen but no tremor; speech very soft and small amount and not spontaneous;  impoverished thinking; acknowledgees AH with wish to harm self but not in the hospital.  No HI.   Acknowledges being non-compliant and using drugs including opates.   No reports of diarrhea; no piloerection; appears very internally preoccupied.  Discussed with patient past treatment with Invega to [facility] and agrees to restart treatment.

## 2018-01-16 NOTE — PROGRESS NOTE BEHAVIORAL HEALTH - JUDGMENT (REGARDING EVERYDAY EVENTS)
----- Message from Vikash Robledo MD sent at 1/15/2018 10:47 AM CST -----  Plan pap at pp exam-If persistent LGSIL, would then proceed with [facility].   Good

## 2018-01-16 NOTE — BEHAVIORAL HEALTH ASSESSMENT NOTE - NSBHCHARTREVIEWLAB_PSY_A_CORE FT
01-15    139  |  100  |  22  ----------------------------<  135<H>  4.0   |  26  |  1.02    Ca    9.4      15 Kvng 2018 10:39                          11.0   10.3  )-----------( 237      ( 15 Kvng 2018 10:39 )             32.9

## 2018-01-16 NOTE — BEHAVIORAL HEALTH ASSESSMENT NOTE - NSBHCHARTREVIEWVS_PSY_A_CORE FT
Vital Signs Last 24 Hrs  T(C): 36.7 (16 Jan 2018 16:21), Max: 36.7 (16 Jan 2018 16:21)  T(F): 98 (16 Jan 2018 16:21), Max: 98 (16 Jan 2018 16:21)  HR: 108 (16 Jan 2018 16:21) (101 - 111)  BP: 124/86 (16 Jan 2018 16:21) (115/81 - 127/74)  BP(mean): --  RR: 18 (16 Jan 2018 16:21) (18 - 18)  SpO2: --

## 2018-01-16 NOTE — BEHAVIORAL HEALTH ASSESSMENT NOTE - NSBHADMITCOUNSEL_PSY_A_CORE
instructions for management, treatment and follow up/risk factor reduction/risks and benefits of treatment options/diagnostic results/impressions and/or recommended studies/importance of adherence to chosen treatment

## 2018-01-16 NOTE — BEHAVIORAL HEALTH ASSESSMENT NOTE - NSBHVIOLRISKFACTORS_PSY_A_CORE
Substance abuse/Noncompliance with treatment/Antisocial behavior/cognition (past or present)/Affective dysregulation/Impulsivity

## 2018-01-16 NOTE — BEHAVIORAL HEALTH ASSESSMENT NOTE - NSBHSUICRISKFACTOR_PSY_A_CORE
Unable to engage in safety planning/Substance abuse/dependence/Hopelessness/Command hallucinations to hurt self/Impulsivity/Mood episode

## 2018-01-17 DIAGNOSIS — F20.9 SCHIZOPHRENIA, UNSPECIFIED: ICD-10-CM

## 2018-01-17 DIAGNOSIS — F19.90 OTHER PSYCHOACTIVE SUBSTANCE USE, UNSPECIFIED, UNCOMPLICATED: ICD-10-CM

## 2018-01-17 DIAGNOSIS — R45.851 SUICIDAL IDEATIONS: ICD-10-CM

## 2018-01-17 PROCEDURE — 99233 SBSQ HOSP IP/OBS HIGH 50: CPT

## 2018-01-17 RX ORDER — PALIPERIDONE 1.5 MG/1
6 TABLET, EXTENDED RELEASE ORAL DAILY
Qty: 0 | Refills: 0 | Status: DISCONTINUED | OUTPATIENT
Start: 2018-01-18 | End: 2018-01-19

## 2018-01-17 RX ADMIN — HALOPERIDOL DECANOATE 5 MILLIGRAM(S): 100 INJECTION INTRAMUSCULAR at 21:47

## 2018-01-17 RX ADMIN — PALIPERIDONE 3 MILLIGRAM(S): 1.5 TABLET, EXTENDED RELEASE ORAL at 10:46

## 2018-01-17 RX ADMIN — Medication 50 MILLIGRAM(S): at 21:46

## 2018-01-17 RX ADMIN — Medication 2 MILLIGRAM(S): at 21:47

## 2018-01-17 RX ADMIN — Medication 2 MILLIGRAM(S): at 10:48

## 2018-01-17 RX ADMIN — Medication 650 MILLIGRAM(S): at 10:52

## 2018-01-17 NOTE — PROGRESS NOTE BEHAVIORAL HEALTH - SUMMARY
52 year old man with PPHx of Heroin / Cocaine / EtOH use disorders and Bipolar vs schizoaffective disorder most recent episode depressed presents to ED BIB self for SI in the setting of heroin and cocaine use as well as medication noncompliance. Patient with history most consistent with antisocial personality disorder, but presents in crisis following acute intoxication. Secondary gain possible given pt's future oriented narrative (wants help with housing). However, given the episode, patient is a danger to self given ongoing substance use and would benefit from inpatient admission to monitor for EtOH withdrawal and ongoing suicidality. 52 year old man with PPHx of Heroin / Cocaine / EtOH use disorders and Bipolar vs schizoaffective disorder most recent episode depressed presents to ED BIB self for SI in the setting of heroin and cocaine use as well as medication noncompliance. Patient with history most consistent with antisocial personality disorder, but presents in crisis following acute intoxication. Secondary gain possible given pt's future oriented narrative (wants help with housing). However, given the episode, patient is a danger to self given ongoing substance use and would benefit from inpatient admission to monitor for EtOH withdrawal and ongoing suicidality.  ;;01/17: Pt is superficially cooperative, isolated, reporting non acute withdrawal s/s. depressed mood and CAH; reports feeling safe in the hospital; inquires about methadone tx. 52 year old man with PPHx of Heroin / Cocaine / EtOH use disorders and Bipolar vs schizoaffective disorder most recent episode depressed presents to ED BIB self for SI in the setting of heroin and cocaine use as well as medication noncompliance. Patient with history most consistent with antisocial personality disorder, but presents in crisis following acute intoxication. Secondary gain possible given pt's future oriented narrative (wants help with housing). However, given the episode, patient is a danger to self given ongoing substance use and would benefit from inpatient admission to monitor for EtOH withdrawal and ongoing suicidality.  ;;01/17: Pt is superficially cooperative, isolated, reporting non acute withdrawal s/s. depressed mood and CAH; reports feeling safe in the hospital; inquires about methadone tx; will titrate invega from 3mg - 6 mg daily.

## 2018-01-17 NOTE — PROGRESS NOTE BEHAVIORAL HEALTH - NSBHFUPINTERVALHXFT_PSY_A_CORE
Patient sitting in the common room watching TV; presents alert, oriented and cooperative. Pt reports feeling "low and anxious" with disturbed sleep and appetite in context of using dope; reports withdrawal s/s of altered bowel movement and muscle aches, reports able to tolerate and inquires about the possibility of getting methadone. Reports feeling suicidal in context of AH commanding to "go out of hospital and jump in front of train". Denies VH and HI; delusions not verbalized.   Pt reports feeling safe inside the hospital and states family as protective factor although not in contact with them for a while.    Pt is in good behavioral control; compliant with the medications and denies any acute medical issues.

## 2018-01-17 NOTE — PROGRESS NOTE BEHAVIORAL HEALTH - PROBLEM SELECTOR PLAN 2
- Will continues Invega 3 mg and monitor for response and SE  - I/G/M tx - Will titrate Invega to 6 mg from 3 mg and monitor for response and SE  - I/G/M tx

## 2018-01-18 PROCEDURE — 99233 SBSQ HOSP IP/OBS HIGH 50: CPT

## 2018-01-18 RX ADMIN — Medication 50 MILLIGRAM(S): at 21:31

## 2018-01-18 RX ADMIN — Medication 50 MILLIGRAM(S): at 21:32

## 2018-01-18 RX ADMIN — Medication 650 MILLIGRAM(S): at 11:54

## 2018-01-18 RX ADMIN — Medication 650 MILLIGRAM(S): at 21:31

## 2018-01-18 RX ADMIN — Medication 2 MILLIGRAM(S): at 11:54

## 2018-01-18 RX ADMIN — PALIPERIDONE 6 MILLIGRAM(S): 1.5 TABLET, EXTENDED RELEASE ORAL at 11:54

## 2018-01-18 NOTE — PROGRESS NOTE BEHAVIORAL HEALTH - NSBHCHARTREVIEWVS_PSY_A_CORE FT
Vital Signs Last 24 Hrs  T(C): 36.9 (18 Jan 2018 09:00), Max: 37.2 (17 Jan 2018 21:00)  T(F): 98.5 (18 Jan 2018 09:00), Max: 98.9 (17 Jan 2018 21:00)  HR: 99 (18 Jan 2018 09:00) (97 - 108)  BP: 131/72 (18 Jan 2018 09:00) (105/75 - 139/87)  BP(mean): --  RR: 16 (18 Jan 2018 09:00) (16 - 18)  SpO2: --

## 2018-01-18 NOTE — PROGRESS NOTE BEHAVIORAL HEALTH - NSBHFUPINTERVALHXFT_PSY_A_CORE
Patient sitting in the common; presents alert, oriented and cooperative. Pt reports feeling "low and the same" with disturbed sleep and appetite in context of using dope; continues to report withdrawal s/s as muscle aches. Continues to report feeling suicidal in context of AH, although decreased in frequency, commanding to "go out of hospital and jump in front of train". Denies VH and HI; delusions not verbalized. Pt expresses his concern to go for rehabilitation program.  Pt reports feeling safe inside the hospital.    Pt is in good behavioral control; compliant with the medications and denies any acute medical issues.

## 2018-01-18 NOTE — PROGRESS NOTE BEHAVIORAL HEALTH - SUMMARY
52 year old man with PPHx of Heroin / Cocaine / EtOH use disorders and Bipolar vs schizoaffective disorder most recent episode depressed presents to ED BIB self for SI in the setting of heroin and cocaine use as well as medication noncompliance. Patient with history most consistent with antisocial personality disorder, but presents in crisis following acute intoxication. Secondary gain possible given pt's future oriented narrative (wants help with housing). However, given the episode, patient is a danger to self given ongoing substance use and would benefit from inpatient admission to monitor for EtOH withdrawal and ongoing suicidality.  ;;01/17: Pt is superficially cooperative, isolated, reporting non acute withdrawal s/s. depressed mood and CAH; reports feeling safe in the hospital; inquires about methadone tx; will titrate invega from 3mg - 6 mg daily.  ;;01/18: Pt reports feeling low, continues to report AH though decreased in frequency; denies GI s/s but reports having body aches, in good behavioral control

## 2018-01-19 PROCEDURE — 99233 SBSQ HOSP IP/OBS HIGH 50: CPT

## 2018-01-19 RX ORDER — PALIPERIDONE 1.5 MG/1
9 TABLET, EXTENDED RELEASE ORAL DAILY
Qty: 0 | Refills: 0 | Status: DISCONTINUED | OUTPATIENT
Start: 2018-01-19 | End: 2018-02-01

## 2018-01-19 RX ADMIN — Medication 650 MILLIGRAM(S): at 10:42

## 2018-01-19 RX ADMIN — Medication 650 MILLIGRAM(S): at 21:27

## 2018-01-19 RX ADMIN — Medication 50 MILLIGRAM(S): at 21:26

## 2018-01-19 RX ADMIN — Medication 50 MILLIGRAM(S): at 21:27

## 2018-01-19 RX ADMIN — Medication 50 MILLIGRAM(S): at 10:42

## 2018-01-19 RX ADMIN — PALIPERIDONE 6 MILLIGRAM(S): 1.5 TABLET, EXTENDED RELEASE ORAL at 10:42

## 2018-01-19 NOTE — PROGRESS NOTE BEHAVIORAL HEALTH - SUMMARY
52 year old man with PPHx of Heroin / Cocaine / EtOH use disorders and Bipolar vs schizoaffective disorder most recent episode depressed presents to ED BIB self for SI in the setting of heroin and cocaine use as well as medication noncompliance. Patient with history most consistent with antisocial personality disorder, but presents in crisis following acute intoxication. Secondary gain possible given pt's future oriented narrative (wants help with housing). However, given the episode, patient is a danger to self given ongoing substance use and would benefit from inpatient admission to monitor for EtOH withdrawal and ongoing suicidality.  ;;01/17: Pt is superficially cooperative, isolated, reporting non acute withdrawal s/s. depressed mood and CAH; reports feeling safe in the hospital; inquires about methadone tx; will titrate invega from 3mg - 6 mg daily.  ;;01/18: Pt reports feeling low, continues to report AH though decreased in frequency; denies GI s/s but reports having body aches, in good behavioral control  ;;01/19: Pt reports feeling better and improvement in mood; denies withdrawal s/s; reports tolerating Invega well without SE

## 2018-01-19 NOTE — PROGRESS NOTE BEHAVIORAL HEALTH - NSBHFUPINTERVALHXFT_PSY_A_CORE
Patient sitting in the common watching TV; presents alert, oriented and cooperative. Pt reports feeling "better" with improved sleep and appetite; denies withdrawal s/s. Continues to report feeling suicidal in context of AH, although decreased intensity and not bothersome. Denies VH and HI; delusions not verbalized. Pt expresses his concern to go for rehabilitation program.  Pt reports feeling safe inside the hospital.    Pt is in good behavioral control; compliant with the medications and denies any acute medical issues.

## 2018-01-19 NOTE — PROGRESS NOTE BEHAVIORAL HEALTH - NSBHCHARTREVIEWVS_PSY_A_CORE FT
Vital Signs Last 24 Hrs  T(C): 37.2 (19 Jan 2018 09:00), Max: 37.2 (19 Jan 2018 09:00)  T(F): 98.9 (19 Jan 2018 09:00), Max: 98.9 (19 Jan 2018 09:00)  HR: 120 (19 Jan 2018 09:00) (103 - 120)  BP: 128/83 (19 Jan 2018 09:00) (118/86 - 128/83)  BP(mean): --  RR: 20 (19 Jan 2018 09:00) (18 - 20)  SpO2: --

## 2018-01-20 RX ADMIN — PALIPERIDONE 9 MILLIGRAM(S): 1.5 TABLET, EXTENDED RELEASE ORAL at 10:13

## 2018-01-20 RX ADMIN — Medication 50 MILLIGRAM(S): at 21:44

## 2018-01-20 RX ADMIN — Medication 650 MILLIGRAM(S): at 10:14

## 2018-01-20 RX ADMIN — Medication 650 MILLIGRAM(S): at 21:43

## 2018-01-20 RX ADMIN — HALOPERIDOL DECANOATE 5 MILLIGRAM(S): 100 INJECTION INTRAMUSCULAR at 10:14

## 2018-01-21 RX ADMIN — HALOPERIDOL DECANOATE 5 MILLIGRAM(S): 100 INJECTION INTRAMUSCULAR at 10:41

## 2018-01-21 RX ADMIN — HALOPERIDOL DECANOATE 5 MILLIGRAM(S): 100 INJECTION INTRAMUSCULAR at 21:05

## 2018-01-21 RX ADMIN — Medication 50 MILLIGRAM(S): at 21:05

## 2018-01-21 RX ADMIN — Medication 650 MILLIGRAM(S): at 10:41

## 2018-01-21 RX ADMIN — PALIPERIDONE 9 MILLIGRAM(S): 1.5 TABLET, EXTENDED RELEASE ORAL at 10:41

## 2018-01-21 RX ADMIN — Medication 650 MILLIGRAM(S): at 21:05

## 2018-01-22 LAB — HBA1C BLD-MCNC: 5.4 % — SIGNIFICANT CHANGE UP (ref 4–5.6)

## 2018-01-22 PROCEDURE — 99233 SBSQ HOSP IP/OBS HIGH 50: CPT

## 2018-01-22 RX ADMIN — Medication 2 MILLIGRAM(S): at 21:21

## 2018-01-22 RX ADMIN — PALIPERIDONE 9 MILLIGRAM(S): 1.5 TABLET, EXTENDED RELEASE ORAL at 09:31

## 2018-01-22 RX ADMIN — Medication 2 MILLIGRAM(S): at 09:31

## 2018-01-22 RX ADMIN — Medication 50 MILLIGRAM(S): at 21:21

## 2018-01-22 RX ADMIN — Medication 650 MILLIGRAM(S): at 21:21

## 2018-01-22 RX ADMIN — Medication 650 MILLIGRAM(S): at 09:31

## 2018-01-22 NOTE — PROGRESS NOTE BEHAVIORAL HEALTH - NSBHFUPINTERVALHXFT_PSY_A_CORE
Patient evaluated and case discussed with the treatment team. Pt is sitting in the living room comfortably; presents alert, oriented and cooperative. Pt reports feeling "better" with appropriate affect; denies issues with sleep and appetite although reports feeling anxious and irritable at times particularly after lunch. Continues to report feeling suicidal in context of CAH, although decreased frequency and less bothersome. Denies VH and HI; delusions not verbalized. Pt expresses his concern to go for rehabilitation program.  Pt reports feeling safe inside the hospital.    Pt is in good behavioral control; compliant with the medications and denies any acute medical issues and tolerating titration of Invega without any side effects. Pt continues to be methadone seeking.

## 2018-01-22 NOTE — PROGRESS NOTE BEHAVIORAL HEALTH - SUMMARY
52 year old man with PPHx of Heroin / Cocaine / EtOH use disorders and Bipolar vs schizoaffective disorder most recent episode depressed presents to ED BIB self for SI in the setting of heroin and cocaine use as well as medication noncompliance. Patient with history most consistent with antisocial personality disorder, but presents in crisis following acute intoxication. Secondary gain possible given pt's future oriented narrative (wants help with housing). However, given the episode, patient is a danger to self given ongoing substance use and would benefit from inpatient admission to monitor for EtOH withdrawal and ongoing suicidality.  ;;01/17: Pt is superficially cooperative, isolated, reporting non acute withdrawal s/s. depressed mood and CAH; reports feeling safe in the hospital; inquires about methadone tx; will titrate invega from 3mg - 6 mg daily.  ;;01/18: Pt reports feeling low, continues to report AH though decreased in frequency; denies GI s/s but reports having body aches, in good behavioral control  ;;01/19: Pt reports feeling better and improvement in mood; denies withdrawal s/s; reports tolerating Invega well without SE  ;;01/22: Pt demonstrates improvement in mood and decreased CAH, tolerating Invega well without any side effects, reports feeling anxious at times particularly after lunch, denies any acute medical concerns; remains methadone seeking

## 2018-01-22 NOTE — PROGRESS NOTE BEHAVIORAL HEALTH - NSBHCHARTREVIEWVS_PSY_A_CORE FT
Vital Signs Last 24 Hrs  T(C): 37.2 (22 Jan 2018 06:15), Max: 37.3 (21 Jan 2018 20:20)  T(F): 98.9 (22 Jan 2018 06:15), Max: 99.1 (21 Jan 2018 20:20)  HR: 85 (22 Jan 2018 06:15) (85 - 112)  BP: 138/88 (22 Jan 2018 06:15) (137/94 - 140/86)  BP(mean): --  RR: 18 (22 Jan 2018 06:15) (18 - 18)  SpO2: --

## 2018-01-23 PROCEDURE — 99233 SBSQ HOSP IP/OBS HIGH 50: CPT

## 2018-01-23 RX ADMIN — Medication 650 MILLIGRAM(S): at 10:33

## 2018-01-23 RX ADMIN — Medication 50 MILLIGRAM(S): at 21:41

## 2018-01-23 RX ADMIN — PALIPERIDONE 9 MILLIGRAM(S): 1.5 TABLET, EXTENDED RELEASE ORAL at 10:30

## 2018-01-23 RX ADMIN — Medication 650 MILLIGRAM(S): at 21:41

## 2018-01-23 RX ADMIN — Medication 2 MILLIGRAM(S): at 21:41

## 2018-01-23 RX ADMIN — Medication 2 MILLIGRAM(S): at 10:33

## 2018-01-23 NOTE — PROGRESS NOTE BEHAVIORAL HEALTH - NSBHFUPINTERVALHXFT_PSY_A_CORE
Continues more visible but very quiet and internally preoccupied;  sits in the TV lounge watching a movie but mostly staying to himself.

## 2018-01-23 NOTE — PROGRESS NOTE BEHAVIORAL HEALTH - NSBHCHARTREVIEWVS_PSY_A_CORE FT
Vital Signs Last 24 Hrs  T(C): 37.2 (23 Jan 2018 08:54), Max: 37.2 (23 Jan 2018 08:54)  T(F): 98.9 (23 Jan 2018 08:54), Max: 98.9 (23 Jan 2018 08:54)  HR: 120 (23 Jan 2018 08:54) (81 - 120)  BP: 127/88 (23 Jan 2018 08:54) (120/80 - 132/87)  BP(mean): --  RR: 18 (23 Jan 2018 08:54) (18 - 20)  SpO2: --

## 2018-01-23 NOTE — PROGRESS NOTE BEHAVIORAL HEALTH - SUMMARY
52 year old man with PPHx of Heroin / Cocaine / EtOH use disorders and Bipolar vs schizoaffective disorder most recent episode depressed presents to ED BIB self for SI in the setting of heroin and cocaine use as well as medication noncompliance. Patient with history most consistent with antisocial personality disorder, but presents in crisis following acute intoxication. Secondary gain possible given pt's future oriented narrative (wants help with housing). However, given the episode, patient is a danger to self given ongoing substance use and would benefit from inpatient admission to monitor for EtOH withdrawal and ongoing suicidality.  ;;01/17: Pt is superficially cooperative, isolated, reporting non acute withdrawal s/s. depressed mood and CAH; reports feeling safe in the hospital; inquires about methadone tx; will titrate invega from 3mg - 6 mg daily.  ;;01/18: Pt reports feeling low, continues to report AH though decreased in frequency; denies GI s/s but reports having body aches, in good behavioral control  ;;01/19: Pt reports feeling better and improvement in mood; denies withdrawal s/s; reports tolerating Invega well without SE  ;;01/21: Pt demonstrates improvement in mood and decreased CAH, tolerating Invega well without any side effects, reports feeling anxious at times particularly after lunch, denies any acute medical concerns; remains methadone seeking  ;;1/22: Continues more visible but very quiet and internally preoccupied;  sits in the TV lounge watching a movie but mostly staying to himself.  sleep appetite ok.  Avoids discussion but does indicated that voices are lessening.  Responds with hand gestures and monosyllabic replies.  avoids eye contact;   Some response to Invega 9mg but less brisk and slower than previous treatments. Need to consider augmentation.  e.g. adding an SSRI for possible comorbid depressive sxs. 52 year old man with PPHx of Heroin / Cocaine / EtOH use disorders and Bipolar vs schizoaffective disorder most recent episode depressed presents to ED BIB self for SI in the setting of heroin and cocaine use as well as medication noncompliance. Patient with history most consistent with antisocial personality disorder, but presents in crisis following acute intoxication. Secondary gain possible given pt's future oriented narrative (wants help with housing). However, given the episode, patient is a danger to self given ongoing substance use and would benefit from inpatient admission to monitor for EtOH withdrawal and ongoing suicidality.  ;;01/17: Pt is superficially cooperative, isolated, reporting non acute withdrawal s/s. depressed mood and CAH; reports feeling safe in the hospital; inquires about methadone tx; will titrate invega from 3mg - 6 mg daily.  ;;01/18: Pt reports feeling low, continues to report AH though decreased in frequency; denies GI s/s but reports having body aches, in good behavioral control  ;;01/19: Pt reports feeling better and improvement in mood; denies withdrawal s/s; reports tolerating Invega well without SE  ;;01/22: Pt demonstrates improvement in mood and decreased CAH, tolerating Invega well without any side effects, reports feeling anxious at times particularly after lunch, denies any acute medical concerns; remains methadone seeking  ;;1/23: Continues more visible but very quiet and internally preoccupied;  sits in the TV lounge watching a movie but mostly staying to himself.  sleep appetite ok.  Avoids discussion but does indicated that voices are lessening.  Responds with hand gestures and monosyllabic replies.  avoids eye contact;   Some response to Invega 9mg but less brisk and slower than previous treatments. Need to consider augmentation.  e.g. adding an SSRI for possible comorbid depressive sxs.

## 2018-01-24 DIAGNOSIS — M54.5 LOW BACK PAIN: ICD-10-CM

## 2018-01-24 PROCEDURE — 99233 SBSQ HOSP IP/OBS HIGH 50: CPT

## 2018-01-24 RX ORDER — PALIPERIDONE 1.5 MG/1
234 TABLET, EXTENDED RELEASE ORAL ONCE
Qty: 0 | Refills: 0 | Status: COMPLETED | OUTPATIENT
Start: 2018-01-24 | End: 2018-01-24

## 2018-01-24 RX ADMIN — Medication 50 MILLIGRAM(S): at 09:56

## 2018-01-24 RX ADMIN — PALIPERIDONE 9 MILLIGRAM(S): 1.5 TABLET, EXTENDED RELEASE ORAL at 09:54

## 2018-01-24 RX ADMIN — PALIPERIDONE 234 MILLIGRAM(S): 1.5 TABLET, EXTENDED RELEASE ORAL at 17:44

## 2018-01-24 RX ADMIN — Medication 650 MILLIGRAM(S): at 09:57

## 2018-01-24 RX ADMIN — Medication 50 MILLIGRAM(S): at 21:42

## 2018-01-24 RX ADMIN — Medication 650 MILLIGRAM(S): at 21:42

## 2018-01-24 RX ADMIN — HALOPERIDOL DECANOATE 5 MILLIGRAM(S): 100 INJECTION INTRAMUSCULAR at 09:57

## 2018-01-24 NOTE — PROGRESS NOTE BEHAVIORAL HEALTH - PROBLEM SELECTOR PLAN 2
- Will continues Invega 9 mg and monitor for response and SE  - I/G/M tx - Will continues Invega 9 mg and monitor for response and SE;  SUSTENNA 234mg IM today (1/24); if tolerated f/u in one week (1/31) at 156mg IM  - I/G/M tx

## 2018-01-24 NOTE — PROGRESS NOTE BEHAVIORAL HEALTH - SUMMARY
52 year old man with PPHx of Heroin / Cocaine / EtOH use disorders and Bipolar vs schizoaffective disorder most recent episode depressed presents to ED BIB self for SI in the setting of heroin and cocaine use as well as medication noncompliance. Patient with history most consistent with antisocial personality disorder, but presents in crisis following acute intoxication. Secondary gain possible given pt's future oriented narrative (wants help with housing). However, given the episode, patient is a danger to self given ongoing substance use and would benefit from inpatient admission to monitor for EtOH withdrawal and ongoing suicidality.  ;;01/17: Pt is superficially cooperative, isolated, reporting non acute withdrawal s/s. depressed mood and CAH; reports feeling safe in the hospital; inquires about methadone tx; will titrate invega from 3mg - 6 mg daily.  ;;01/18: Pt reports feeling low, continues to report AH though decreased in frequency; denies GI s/s but reports having body aches, in good behavioral control  ;;01/19: Pt reports feeling better and improvement in mood; denies withdrawal s/s; reports tolerating Invega well without SE  ;;01/22: Pt demonstrates improvement in mood and decreased CAH, tolerating Invega well without any side effects, reports feeling anxious at times particularly after lunch, denies any acute medical concerns; remains methadone seeking 52 year old man with PPHx of Heroin / Cocaine / EtOH use disorders and Bipolar vs schizoaffective disorder most recent episode depressed presents to ED BIB self for SI in the setting of heroin and cocaine use as well as medication noncompliance. Patient with history most consistent with antisocial personality disorder, but presents in crisis following acute intoxication. Secondary gain possible given pt's future oriented narrative (wants help with housing). However, given the episode, patient is a danger to self given ongoing substance use and would benefit from inpatient admission to monitor for EtOH withdrawal and ongoing suicidality.  ;;01/17: Pt is superficially cooperative, isolated, reporting non acute withdrawal s/s. depressed mood and CAH; reports feeling safe in the hospital; inquires about methadone tx; will titrate invega from 3mg - 6 mg daily.  ;;01/18: Pt reports feeling low, continues to report AH though decreased in frequency; denies GI s/s but reports having body aches, in good behavioral control  ;;01/19: Pt reports feeling better and improvement in mood; denies withdrawal s/s; reports tolerating Invega well without SE  ;;01/22: Pt demonstrates improvement in mood and decreased CAH, tolerating Invega well without any side effects, reports feeling anxious at times particularly after lunch, denies any acute medical concerns; remains methadone seeking  ;;01/24: Pt mood improved; reports minimal AH; plan to start Invega ZAMORA, pt in agreement with; requests Neurontin for back pain.

## 2018-01-24 NOTE — PROGRESS NOTE BEHAVIORAL HEALTH - NSBHFUPINTERVALHXFT_PSY_A_CORE
Patient evaluated and case discussed with the attending supervisor and treatment team. Pt is watching documentary in the common room; presents alert, oriented and cooperative; greets with hand shake and a smile. Pt reports feeling "fine" with constricted affect; denies issues with sleep and appetite and concentration. Denies suicidal and homicidal ideation. Continues to report minimal AH, not bothersome. Denies VH and delusions not verbalized. Pt inquires about the rehabilitation program and counseled about discontinuing PRN benzodiazapine, pt is in agreement with.  Pt reports feeling safe inside the hospital.    Pt is in good behavioral control; compliant with the medications and denies any acute medical issues and tolerated titration of Invega without any side effects. Patient evaluated and case discussed with the attending supervisor and treatment team. Pt is watching documentary in the common room; presents alert, oriented and cooperative; greets with hand shake and a smile. Pt reports feeling "fine" with constricted affect; denies issues with sleep and appetite and concentration. Denies suicidal and homicidal ideation. Continues to report minimal AH, not bothersome. Denies VH and delusions not verbalized. Pt inquires about the rehabilitation program and counseled about discontinuing PRN benzodiazapine, pt is in agreement with.   Pt reports feeling safe inside the hospital.    Pt is in good behavioral control; compliant with the medications and denies any acute medical issues and tolerated titration of Invega without any side effects. Invega ZAMORA (Sustenna) discussed with the patient.

## 2018-01-24 NOTE — PROGRESS NOTE BEHAVIORAL HEALTH - NSBHCHARTREVIEWVS_PSY_A_CORE FT
Vital Signs Last 24 Hrs  T(C): 37.2 (22 Jan 2018 06:15), Max: 37.3 (21 Jan 2018 20:20)  T(F): 98.9 (22 Jan 2018 06:15), Max: 99.1 (21 Jan 2018 20:20)  HR: 85 (22 Jan 2018 06:15) (85 - 112)  BP: 138/88 (22 Jan 2018 06:15) (137/94 - 140/86)  BP(mean): --  RR: 18 (22 Jan 2018 06:15) (18 - 18)  SpO2: -- Vital Signs Last 24 Hrs  T(C): 36.9 (24 Jan 2018 08:19), Max: 37.2 (23 Jan 2018 20:26)  T(F): 98.4 (24 Jan 2018 08:19), Max: 99 (23 Jan 2018 20:26)  HR: 102 (24 Jan 2018 08:19) (102 - 108)  BP: 123/86 (24 Jan 2018 08:19) (120/76 - 140/81)  BP(mean): --  RR: 20 (24 Jan 2018 08:19) (16 - 20)  SpO2: --

## 2018-01-25 PROCEDURE — 99233 SBSQ HOSP IP/OBS HIGH 50: CPT

## 2018-01-25 RX ORDER — GABAPENTIN 400 MG/1
300 CAPSULE ORAL
Qty: 0 | Refills: 0 | Status: DISCONTINUED | OUTPATIENT
Start: 2018-01-25 | End: 2018-01-26

## 2018-01-25 RX ORDER — GABAPENTIN 400 MG/1
300 CAPSULE ORAL ONCE
Qty: 0 | Refills: 0 | Status: COMPLETED | OUTPATIENT
Start: 2018-01-25 | End: 2018-01-25

## 2018-01-25 RX ADMIN — PALIPERIDONE 9 MILLIGRAM(S): 1.5 TABLET, EXTENDED RELEASE ORAL at 10:17

## 2018-01-25 RX ADMIN — HALOPERIDOL DECANOATE 5 MILLIGRAM(S): 100 INJECTION INTRAMUSCULAR at 10:17

## 2018-01-25 RX ADMIN — GABAPENTIN 300 MILLIGRAM(S): 400 CAPSULE ORAL at 21:48

## 2018-01-25 RX ADMIN — Medication 650 MILLIGRAM(S): at 21:50

## 2018-01-25 RX ADMIN — GABAPENTIN 300 MILLIGRAM(S): 400 CAPSULE ORAL at 13:14

## 2018-01-25 RX ADMIN — Medication 650 MILLIGRAM(S): at 10:16

## 2018-01-25 RX ADMIN — Medication 50 MILLIGRAM(S): at 21:50

## 2018-01-25 RX ADMIN — Medication 50 MILLIGRAM(S): at 21:48

## 2018-01-25 NOTE — PROGRESS NOTE BEHAVIORAL HEALTH - SUMMARY
52 year old man with PPHx of Heroin / Cocaine / EtOH use disorders and Bipolar vs schizoaffective disorder most recent episode depressed presents to ED BIB self for SI in the setting of heroin and cocaine use as well as medication noncompliance. Patient with history most consistent with antisocial personality disorder, but presents in crisis following acute intoxication. Secondary gain possible given pt's future oriented narrative (wants help with housing). However, given the episode, patient is a danger to self given ongoing substance use and would benefit from inpatient admission to monitor for EtOH withdrawal and ongoing suicidality.  ;;01/17: Pt is superficially cooperative, isolated, reporting non acute withdrawal s/s. depressed mood and CAH; reports feeling safe in the hospital; inquires about methadone tx; will titrate invega from 3mg - 6 mg daily.  ;;01/18: Pt reports feeling low, continues to report AH though decreased in frequency; denies GI s/s but reports having body aches, in good behavioral control  ;;01/19: Pt reports feeling better and improvement in mood; denies withdrawal s/s; reports tolerating Invega well without SE  ;;01/22: Pt demonstrates improvement in mood and decreased CAH, tolerating Invega well without any side effects, reports feeling anxious at times particularly after lunch, denies any acute medical concerns; remains methadone seeking  ;;01/24: Pt mood improved; reports minimal AH; plan to start Invega ZAMORA, pt in agreement with; requests Neurontin for back pain.  ;;01/25: Reports feeling anxious potentially in context of d/c ativan, started on Neurontin 300 BID, will be monitored

## 2018-01-25 NOTE — PROGRESS NOTE BEHAVIORAL HEALTH - PROBLEM SELECTOR PLAN 2
- Will continues Invega 9 mg and monitor for response and SE;  SUSTENNA 234mg IM today (1/24); if tolerated f/u in one week (1/31) at 156mg IM  - I/G/M tx

## 2018-01-25 NOTE — PROGRESS NOTE BEHAVIORAL HEALTH - NSBHFUPINTERVALHXFT_PSY_A_CORE
Patient evaluated and case discussed with the attending supervisor and treatment team. Met with patient this morning. Alert and orientedx3, watching television in common area. Mood remians stable, appears calm. Affect still constricted but pleasant, smiling throughout interview. Reports fewer auditory commanding hallucinations, last reported yesterday, denies visual hallucinations. States remaining AH’s are manageable and do not bother him as frequently, but that when they are still present they command him to “jump in front of a train.” Denies issues with sleep, appetite, concentration. Denies SI/HI. Says he is “fine” and that everything else is “fine” when questioned. Feels safe, has no complaints about his care so far.   Patient in good behavioral control. Compliant with medication regimen. Considering adding Neurontin to current regimen, TBD. Patient agrees to treatment plan. Discussed being transferred to rehab. Will continue to monitor and observe.     Pt is in good behavioral control; compliant with the medications and denies any acute medical issues and tolerated titration of Invega and Sustenna administration well without any side effects.

## 2018-01-25 NOTE — PROGRESS NOTE BEHAVIORAL HEALTH - NSBHCHARTREVIEWVS_PSY_A_CORE FT
Vital Signs Last 24 Hrs  T(C): 37.4 (25 Jan 2018 09:01), Max: 37.4 (25 Jan 2018 09:01)  T(F): 99.3 (25 Jan 2018 09:01), Max: 99.3 (25 Jan 2018 09:01)  HR: 125 (25 Jan 2018 09:01) (84 - 125)  BP: 126/83 (25 Jan 2018 09:01) (124/87 - 132/81)  BP(mean): --  RR: 20 (25 Jan 2018 09:01) (18 - 20)  SpO2: --

## 2018-01-26 DIAGNOSIS — F41.9 ANXIETY DISORDER, UNSPECIFIED: ICD-10-CM

## 2018-01-26 PROCEDURE — 99233 SBSQ HOSP IP/OBS HIGH 50: CPT

## 2018-01-26 RX ORDER — GABAPENTIN 400 MG/1
200 CAPSULE ORAL THREE TIMES A DAY
Qty: 0 | Refills: 0 | Status: DISCONTINUED | OUTPATIENT
Start: 2018-01-26 | End: 2018-01-26

## 2018-01-26 RX ORDER — GABAPENTIN 400 MG/1
150 CAPSULE ORAL THREE TIMES A DAY
Qty: 0 | Refills: 0 | Status: DISCONTINUED | OUTPATIENT
Start: 2018-01-26 | End: 2018-01-26

## 2018-01-26 RX ORDER — GABAPENTIN 400 MG/1
300 CAPSULE ORAL THREE TIMES A DAY
Qty: 0 | Refills: 0 | Status: DISCONTINUED | OUTPATIENT
Start: 2018-01-26 | End: 2018-02-01

## 2018-01-26 RX ADMIN — PALIPERIDONE 9 MILLIGRAM(S): 1.5 TABLET, EXTENDED RELEASE ORAL at 09:25

## 2018-01-26 RX ADMIN — Medication 650 MILLIGRAM(S): at 09:26

## 2018-01-26 RX ADMIN — Medication 50 MILLIGRAM(S): at 21:28

## 2018-01-26 RX ADMIN — GABAPENTIN 300 MILLIGRAM(S): 400 CAPSULE ORAL at 09:25

## 2018-01-26 RX ADMIN — GABAPENTIN 300 MILLIGRAM(S): 400 CAPSULE ORAL at 21:28

## 2018-01-26 RX ADMIN — GABAPENTIN 200 MILLIGRAM(S): 400 CAPSULE ORAL at 13:19

## 2018-01-26 RX ADMIN — Medication 650 MILLIGRAM(S): at 21:28

## 2018-01-26 NOTE — PROGRESS NOTE BEHAVIORAL HEALTH - NSBHFUPINTERVALHXFT_PSY_A_CORE
Patient evaluated and case discussed with the attending supervisor and treatment team. Interviewed in the common room; watching TV; greets with a smile; Alert and orientedx3. Mood remains stable with significant reduction in the anxiety level. Affect still constricted but pleasant. Reports fewer auditory hallucinations, last reported yesterday, denies visual hallucinations. States remaining AH’s are manageable and do not bother. Denies issues with sleep, appetite, concentration. Denies SI/HI.   Patient in good behavioral control. Compliant with medication regimen. Plan to change Neurontin to thrice a day and titrate discussed. Patient agrees to treatment plan. Discussed being transferred to rehab. Will continue to monitor and observe.     Pt is in good behavioral control; compliant with the medications and denies any acute medical issues and tolerated titration of Invega and Sustenna administration well without any side effects.

## 2018-01-26 NOTE — PROGRESS NOTE BEHAVIORAL HEALTH - SUMMARY
52 year old man with PPHx of Heroin / Cocaine / EtOH use disorders and Bipolar vs schizoaffective disorder most recent episode depressed presents to ED BIB self for SI in the setting of heroin and cocaine use as well as medication noncompliance. Patient with history most consistent with antisocial personality disorder, but presents in crisis following acute intoxication. Secondary gain possible given pt's future oriented narrative (wants help with housing). However, given the episode, patient is a danger to self given ongoing substance use and would benefit from inpatient admission to monitor for EtOH withdrawal and ongoing suicidality.  ;;01/17: Pt is superficially cooperative, isolated, reporting non acute withdrawal s/s. depressed mood and CAH; reports feeling safe in the hospital; inquires about methadone tx; will titrate invega from 3mg - 6 mg daily.  ;;01/18: Pt reports feeling low, continues to report AH though decreased in frequency; denies GI s/s but reports having body aches, in good behavioral control  ;;01/19: Pt reports feeling better and improvement in mood; denies withdrawal s/s; reports tolerating Invega well without SE  ;;01/22: Pt demonstrates improvement in mood and decreased CAH, tolerating Invega well without any side effects, reports feeling anxious at times particularly after lunch, denies any acute medical concerns; remains methadone seeking  ;;01/24: Pt mood improved; reports minimal AH; plan to start Invega ZAMORA, pt in agreement with; requests Neurontin for back pain.  ;;01/25: Reports feeling anxious potentially in context of d/c ativan, started on Neurontin 300 BID, will be monitored  ;;01/26: Reports reduction in anxiety level, Neurontin TID regimen started with plan of titration, denies any acute concerns; in good behavioral control

## 2018-01-26 NOTE — PROGRESS NOTE BEHAVIORAL HEALTH - NSBHCHARTREVIEWVS_PSY_A_CORE FT
Vital Signs Last 24 Hrs  T(C): 36.9 (26 Jan 2018 09:11), Max: 36.9 (25 Jan 2018 16:31)  T(F): 98.4 (26 Jan 2018 09:11), Max: 98.5 (25 Jan 2018 16:31)  HR: 112 (26 Jan 2018 09:11) (94 - 112)  BP: 126/84 (25 Jan 2018 16:31) (126/84 - 126/84)  BP(mean): --  RR: 17 (26 Jan 2018 09:11) (17 - 18)  SpO2: --

## 2018-01-27 RX ADMIN — GABAPENTIN 300 MILLIGRAM(S): 400 CAPSULE ORAL at 10:00

## 2018-01-27 RX ADMIN — Medication 50 MILLIGRAM(S): at 21:08

## 2018-01-27 RX ADMIN — GABAPENTIN 300 MILLIGRAM(S): 400 CAPSULE ORAL at 14:50

## 2018-01-27 RX ADMIN — Medication 650 MILLIGRAM(S): at 21:08

## 2018-01-27 RX ADMIN — Medication 650 MILLIGRAM(S): at 10:01

## 2018-01-27 RX ADMIN — GABAPENTIN 300 MILLIGRAM(S): 400 CAPSULE ORAL at 21:08

## 2018-01-27 RX ADMIN — PALIPERIDONE 9 MILLIGRAM(S): 1.5 TABLET, EXTENDED RELEASE ORAL at 10:00

## 2018-01-28 RX ADMIN — Medication 650 MILLIGRAM(S): at 21:41

## 2018-01-28 RX ADMIN — PALIPERIDONE 9 MILLIGRAM(S): 1.5 TABLET, EXTENDED RELEASE ORAL at 09:49

## 2018-01-28 RX ADMIN — GABAPENTIN 300 MILLIGRAM(S): 400 CAPSULE ORAL at 15:59

## 2018-01-28 RX ADMIN — Medication 50 MILLIGRAM(S): at 21:40

## 2018-01-28 RX ADMIN — Medication 650 MILLIGRAM(S): at 09:49

## 2018-01-28 RX ADMIN — GABAPENTIN 300 MILLIGRAM(S): 400 CAPSULE ORAL at 09:49

## 2018-01-28 RX ADMIN — GABAPENTIN 300 MILLIGRAM(S): 400 CAPSULE ORAL at 21:40

## 2018-01-29 RX ADMIN — Medication 50 MILLIGRAM(S): at 21:18

## 2018-01-29 RX ADMIN — Medication 650 MILLIGRAM(S): at 21:18

## 2018-01-29 RX ADMIN — PALIPERIDONE 9 MILLIGRAM(S): 1.5 TABLET, EXTENDED RELEASE ORAL at 09:50

## 2018-01-29 RX ADMIN — GABAPENTIN 300 MILLIGRAM(S): 400 CAPSULE ORAL at 09:50

## 2018-01-29 RX ADMIN — GABAPENTIN 300 MILLIGRAM(S): 400 CAPSULE ORAL at 21:18

## 2018-01-29 RX ADMIN — Medication 650 MILLIGRAM(S): at 09:50

## 2018-01-29 RX ADMIN — GABAPENTIN 300 MILLIGRAM(S): 400 CAPSULE ORAL at 16:20

## 2018-01-29 NOTE — PROGRESS NOTE BEHAVIORAL HEALTH - PROBLEM SELECTOR PLAN 2
- Will continues Invega 9 mg and monitor for response and SE;  SUSTENNA 234mg IM on 1/24; plan to f/u in one week (1/31) at 156mg IM  - I/G/M tx

## 2018-01-29 NOTE — PROGRESS NOTE BEHAVIORAL HEALTH - NSBHFUPINTERVALHXFT_PSY_A_CORE
Case discussed with treatment team. Per nursing report, patient has been isolative but makes needs known. Took PRN Benadryl and Tylenol for headache.    Spoke with patient in common area this morning. A&Ox3. Seated comfortably in chair, watching television. Pleasant and cooperative. Reports mood is “fine.” Affect remains flat, somewhat constricted. Adequate eye contact. Good behavioral control.   States auditory hallucinations are diminished, almost completely gone. Denies HI/SI/VH. Denies issues with concentration, appetite, energy.  	States he is compliant with medication regimen. Reports that Neurontin is helping his lower back pain, requests higher dosage. Will discuss with care team and titrate up as appropriate. No additional somatic complaints. Continue to monitor and observe.

## 2018-01-29 NOTE — PROGRESS NOTE BEHAVIORAL HEALTH - NSBHCHARTREVIEWVS_PSY_A_CORE FT
Vital Signs Last 24 Hrs  T(C): 36.8 (29 Jan 2018 08:39), Max: 37.3 (28 Jan 2018 17:00)  T(F): 98.3 (29 Jan 2018 08:39), Max: 99.1 (28 Jan 2018 17:00)  HR: 96 (29 Jan 2018 08:39) (84 - 96)  BP: 128/82 (29 Jan 2018 08:39) (124/85 - 128/82)  RR: 20 (29 Jan 2018 08:39) (18 - 20)

## 2018-01-29 NOTE — PROGRESS NOTE BEHAVIORAL HEALTH - SUMMARY
52 year old man with PPHx of Heroin / Cocaine / EtOH use disorders and Bipolar vs schizoaffective disorder most recent episode depressed presents to ED BIB self for SI in the setting of heroin and cocaine use as well as medication noncompliance. Patient with history most consistent with antisocial personality disorder, but presents in crisis following acute intoxication. Secondary gain possible given pt's future oriented narrative (wants help with housing). However, given the episode, patient is a danger to self given ongoing substance use and continues to benefit from inpatient admission to monitor for EtOH withdrawal and suicidality.    ;;01/17: Pt is superficially cooperative, isolated, reporting non acute withdrawal s/s. depressed mood and CAH; reports feeling safe in the hospital; inquires about methadone tx; will titrate invega from 3mg - 6 mg daily.  ;;01/18: Pt reports feeling low, continues to report AH though decreased in frequency; denies GI s/s but reports having body aches, in good behavioral control  ;;01/19: Pt reports feeling better and improvement in mood; denies withdrawal s/s; reports tolerating Invega well without SE  ;;01/22: Pt demonstrates improvement in mood and decreased CAH, tolerating Invega well without any side effects, reports feeling anxious at times particularly after lunch, denies any acute medical concerns; remains methadone seeking  ;;01/24: Pt mood improved; reports minimal AH; plan to start Invega ZAMORA, pt in agreement with; requests Neurontin for back pain.  ;;01/25: Reports feeling anxious potentially in context of d/c ativan, started on Neurontin 300 BID, will be monitored  ;;01/26: Reports reduction in anxiety level, Neurontin TID regimen started with plan of titration, denies any acute concerns; in good behavioral control

## 2018-01-30 RX ADMIN — Medication 50 MILLIGRAM(S): at 21:11

## 2018-01-30 RX ADMIN — Medication 650 MILLIGRAM(S): at 10:31

## 2018-01-30 RX ADMIN — PALIPERIDONE 9 MILLIGRAM(S): 1.5 TABLET, EXTENDED RELEASE ORAL at 10:31

## 2018-01-30 RX ADMIN — Medication 650 MILLIGRAM(S): at 21:11

## 2018-01-30 RX ADMIN — GABAPENTIN 300 MILLIGRAM(S): 400 CAPSULE ORAL at 10:31

## 2018-01-30 RX ADMIN — GABAPENTIN 300 MILLIGRAM(S): 400 CAPSULE ORAL at 21:11

## 2018-01-30 NOTE — PROGRESS NOTE BEHAVIORAL HEALTH - DETAILS
"jump in front of train"

## 2018-01-30 NOTE — PROGRESS NOTE BEHAVIORAL HEALTH - NSBHCHARTREVIEWVS_PSY_A_CORE FT
Vital Signs Last 24 Hrs  T(C): 37.2 (30 Jan 2018 10:00), Max: 37.2 (30 Jan 2018 10:00)  T(F): 99 (30 Jan 2018 10:00), Max: 99 (30 Jan 2018 10:00)  HR: 108 (30 Jan 2018 10:00) (80 - 108)  BP: 124/84 (30 Jan 2018 10:00) (116/83 - 124/84)  BP(mean): --  RR: 18 (30 Jan 2018 10:00) (18 - 18)  SpO2: --

## 2018-01-30 NOTE — PROGRESS NOTE BEHAVIORAL HEALTH - NSBHADMITDANGERSELF_PSY_A_CORE
suicidal ideation with plan and means

## 2018-01-30 NOTE — PROGRESS NOTE BEHAVIORAL HEALTH - PROBLEM SELECTOR PLAN 2
- Will continues Invega 9 mg and monitor for response and SE;  SUSTENNA 234mg IM  (1/24); if tolerated f/u in one week (1/31) at 156mg IM  - I/G/M tx

## 2018-01-30 NOTE — PROGRESS NOTE BEHAVIORAL HEALTH - NSBHFUPINTERVALHXFT_PSY_A_CORE
staff reports no behavioral issues; adls continue fair to poor with some group attendance and very limited participation.  residual sxs present.

## 2018-01-30 NOTE — PROGRESS NOTE BEHAVIORAL HEALTH - SUMMARY
52 year old man with PPHx of Heroin / Cocaine / EtOH use disorders and Bipolar vs schizoaffective disorder most recent episode depressed presents to ED BIB self for SI in the setting of heroin and cocaine use as well as medication noncompliance. Patient with history most consistent with antisocial personality disorder, but presents in crisis following acute intoxication. Secondary gain possible given pt's future oriented narrative (wants help with housing). However, given the episode, patient is a danger to self given ongoing substance use and would benefit from inpatient admission to monitor for EtOH withdrawal and ongoing suicidality.  ;;01/17: Pt is superficially cooperative, isolated, reporting non acute withdrawal s/s. depressed mood and CAH; reports feeling safe in the hospital; inquires about methadone tx; will titrate invega from 3mg - 6 mg daily.  ;;01/18: Pt reports feeling low, continues to report AH though decreased in frequency; denies GI s/s but reports having body aches, in good behavioral control  ;;01/19: Pt reports feeling better and improvement in mood; denies withdrawal s/s; reports tolerating Invega well without SE  ;;01/22: Pt demonstrates improvement in mood and decreased CAH, tolerating Invega well without any side effects, reports feeling anxious at times particularly after lunch, denies any acute medical concerns; remains methadone seeking  ;;01/24: Pt mood improved; reports minimal AH; plan to start Invega ZAMORA, pt in agreement with; requests Neurontin for back pain.  ;;01/25: Reports feeling anxious potentially in context of d/c ativan, started on Neurontin 300 BID, will be monitored  ;;01/26: Reports reduction in anxiety level, Neurontin TID regimen started with plan of titration, denies any acute concerns; in good behavioral control  ;;01/30: no pain issues on current meds (Neurontin) ; staff reports no behavioral issues; adls continue fair to poor with some group attendance and very limited participation.  residual sxs present. voices are diminished; no SI; discussed f/u SUSTENNA due tomorrow at 156mg IM.  sleep appetite ok.  "Better";  not conversational limited responses

## 2018-01-31 RX ORDER — NICOTINE POLACRILEX 2 MG
1 GUM BUCCAL
Qty: 42 | Refills: 0 | OUTPATIENT
Start: 2018-01-31 | End: 2018-02-06

## 2018-01-31 RX ORDER — GABAPENTIN 400 MG/1
1 CAPSULE ORAL
Qty: 42 | Refills: 0 | OUTPATIENT
Start: 2018-01-31 | End: 2018-02-13

## 2018-01-31 RX ORDER — GABAPENTIN 400 MG/1
1 CAPSULE ORAL
Qty: 0 | Refills: 0 | COMMUNITY

## 2018-01-31 RX ORDER — DULOXETINE HYDROCHLORIDE 30 MG/1
30 CAPSULE, DELAYED RELEASE ORAL
Qty: 0 | Refills: 0 | COMMUNITY

## 2018-01-31 RX ORDER — PALIPERIDONE 1.5 MG/1
1 TABLET, EXTENDED RELEASE ORAL
Qty: 14 | Refills: 0 | OUTPATIENT
Start: 2018-01-31 | End: 2018-02-13

## 2018-01-31 RX ORDER — DIVALPROEX SODIUM 500 MG/1
750 TABLET, DELAYED RELEASE ORAL
Qty: 0 | Refills: 0 | COMMUNITY

## 2018-01-31 RX ORDER — OLANZAPINE 15 MG/1
200 TABLET, FILM COATED ORAL
Qty: 0 | Refills: 0 | COMMUNITY

## 2018-01-31 RX ORDER — PALIPERIDONE 1.5 MG/1
156 TABLET, EXTENDED RELEASE ORAL ONCE
Qty: 0 | Refills: 0 | Status: COMPLETED | OUTPATIENT
Start: 2018-01-31 | End: 2018-01-31

## 2018-01-31 RX ADMIN — PALIPERIDONE 9 MILLIGRAM(S): 1.5 TABLET, EXTENDED RELEASE ORAL at 10:05

## 2018-01-31 RX ADMIN — Medication 650 MILLIGRAM(S): at 10:04

## 2018-01-31 RX ADMIN — GABAPENTIN 300 MILLIGRAM(S): 400 CAPSULE ORAL at 10:05

## 2018-01-31 RX ADMIN — Medication 50 MILLIGRAM(S): at 21:08

## 2018-01-31 RX ADMIN — GABAPENTIN 300 MILLIGRAM(S): 400 CAPSULE ORAL at 12:51

## 2018-01-31 RX ADMIN — PALIPERIDONE 156 MILLIGRAM(S): 1.5 TABLET, EXTENDED RELEASE ORAL at 12:51

## 2018-01-31 RX ADMIN — GABAPENTIN 300 MILLIGRAM(S): 400 CAPSULE ORAL at 21:08

## 2018-01-31 RX ADMIN — Medication 650 MILLIGRAM(S): at 21:09

## 2018-01-31 NOTE — PROGRESS NOTE BEHAVIORAL HEALTH - NSBHCHARTREVIEWVS_PSY_A_CORE FT
Vital Signs Last 24 Hrs  T(C): 37.1 (31 Jan 2018 09:30), Max: 37.1 (30 Jan 2018 16:25)  T(F): 98.8 (31 Jan 2018 09:30), Max: 98.8 (31 Jan 2018 09:30)  HR: 116 (31 Jan 2018 09:30) (88 - 116)  BP: 121/86 (31 Jan 2018 09:30) (121/86 - 125/84)  RR: 16 (31 Jan 2018 09:30) (16 - 17)

## 2018-01-31 NOTE — PROGRESS NOTE BEHAVIORAL HEALTH - PROBLEM SELECTOR PLAN 3
- Will monitor for withdrawal s/s  - will provide counselling, considering methadone tx  -I/G/M tx

## 2018-01-31 NOTE — PROGRESS NOTE BEHAVIORAL HEALTH - ADDITIONAL DETAILS / COMMENTS
prominent negative symptoms of schizophrenia  responds with only brief answers but is able to be engaged  denies any SI/plan/intent or AH/VH

## 2018-01-31 NOTE — PROGRESS NOTE BEHAVIORAL HEALTH - SUMMARY
52 year old man with PPHx of Heroin / Cocaine / EtOH use disorders and Bipolar vs schizoaffective disorder most recent episode depressed presents to ED BIB self for SI in the setting of heroin and cocaine use as well as medication noncompliance. Patient with history most consistent with antisocial personality disorder, but presents in crisis following acute intoxication. Secondary gain possible given pt's future oriented narrative (wants help with housing). Patient's auditory hallucinations and acute suicidality have resolved with the treatment, and he is indicating desire for abstinence. Currently in process of discharge planning.     ;;01/17: Pt is superficially cooperative, isolated, reporting non acute withdrawal s/s. depressed mood and CAH; reports feeling safe in the hospital; inquires about methadone tx; will titrate invega from 3mg - 6 mg daily.  ;;01/18: Pt reports feeling low, continues to report AH though decreased in frequency; denies GI s/s but reports having body aches, in good behavioral control  ;;01/19: Pt reports feeling better and improvement in mood; denies withdrawal s/s; reports tolerating Invega well without SE  ;;01/22: Pt demonstrates improvement in mood and decreased CAH, tolerating Invega well without any side effects, reports feeling anxious at times particularly after lunch, denies any acute medical concerns; remains methadone seeking  ;;01/24: Pt mood improved; reports minimal AH; plan to start Invega ZAMORA, pt in agreement with; requests Neurontin for back pain.  ;;01/25: Reports feeling anxious potentially in context of d/c ativan, started on Neurontin 300 BID, will be monitored  ;;01/26: Reports reduction in anxiety level, Neurontin TID regimen started with plan of titration, denies any acute concerns; in good behavioral control

## 2018-01-31 NOTE — PROGRESS NOTE BEHAVIORAL HEALTH - PROBLEM SELECTOR PLAN 4
- Neurontin 300 mg TID  - PRN acetaminophen 650mg
- Neurontin 200 mg TID  - PRN acetaminophen 650mg
- Neurontin 200 mg TID  - PRN acetaminophen 650mg
- Neurontin 300 mg TID  - PRN acetaminophen 650mg
- Neurontin 300 mg BID  - PRN acetaminophen 650mg
- Neurontin 300 mg BID  - PRN acetaminophen 650mg

## 2018-01-31 NOTE — PROGRESS NOTE BEHAVIORAL HEALTH - NSBHFUPINTERVALHXFT_PSY_A_CORE
Patient seen and examined. Case discussed with treatment team. Per nursing report, patient is discharge focused, compliant, attends groups. His back pain and anxiety have improved with Neurontin, and his auditory hallucinations have subsided since admission. On evaluation, patient denies any persistent AH and denies depressed mood or SI/HI/VH. He is tolerating medications without reported side effects. He reports that he is ready to be discharged tomorrow and indicates he receives his monthly check on Feb 1, which he emphasizes he will spend on food and clothes but not drugs. Importance of abstinence discussed with patient and he reports a desire to continue with 12 step meetings to support his goal of abstinence.  Patient to receive Invega 156 mg IM today.

## 2018-01-31 NOTE — PROGRESS NOTE BEHAVIORAL HEALTH - PROBLEM SELECTOR PLAN 1
- Will continue Invega 9 mg and monitor for response and SE  - I/G/M tx
- Will continues Invega 3 mg and monitor for response and SE  - I/G/M tx
- Will continue Invega 9 mg and monitor for response and SE  - I/G/M tx
- Will continue Invega 9 mg and monitor for response and SE  - I/G/M tx
- Will continues Invega 3 mg and monitor for response and SE  - I/G/M tx
- Will continues Invega 3 mg and monitor for response and SE  - I/G/M tx

## 2018-01-31 NOTE — PROGRESS NOTE BEHAVIORAL HEALTH - PROBLEM SELECTOR PLAN 2
- Will continues Invega 9 mg and monitor for response and SE;  SUSTENNA 234mg IM on 1/24; Invega 156mg IM ordered for today  - I/G/M tx

## 2018-01-31 NOTE — PROGRESS NOTE BEHAVIORAL HEALTH - PROBLEM SELECTOR PLAN 5
- Neurontin 300 mg TID
- Neurontin 200 mg TID with plan to titrate

## 2018-02-01 VITALS
SYSTOLIC BLOOD PRESSURE: 128 MMHG | DIASTOLIC BLOOD PRESSURE: 89 MMHG | RESPIRATION RATE: 20 BRPM | TEMPERATURE: 99 F | HEART RATE: 76 BPM

## 2018-02-01 RX ADMIN — GABAPENTIN 300 MILLIGRAM(S): 400 CAPSULE ORAL at 10:05

## 2018-02-01 RX ADMIN — Medication 650 MILLIGRAM(S): at 10:05

## 2018-02-01 RX ADMIN — PALIPERIDONE 9 MILLIGRAM(S): 1.5 TABLET, EXTENDED RELEASE ORAL at 10:05

## 2018-02-01 NOTE — PROGRESS NOTE BEHAVIORAL HEALTH - GAIT / STATION
Abnormal gait / station

## 2018-02-01 NOTE — PROGRESS NOTE BEHAVIORAL HEALTH - NSBHPTASSESSDT_PSY_A_CORE
01-Feb-2018 08:35
19-Jan-2018 08:25
22-Jan-2018 08:55
23-Jan-2018 08:50
24-Jan-2018 09:01
25-Jan-2018 08:46
29-Jan-2018 13:14
30-Jan-2018 08:57
18-Jan-2018 08:48
31-Jan-2018 11:29
17-Jan-2018 08:56
26-Jan-2018 08:46

## 2018-02-01 NOTE — PROGRESS NOTE BEHAVIORAL HEALTH - NSBHCHARTREVIEWVS_PSY_A_CORE FT
Vital Signs Last 24 Hrs  T(C): 37.1 (01 Feb 2018 08:46), Max: 37.1 (01 Feb 2018 08:46)  T(F): 98.8 (01 Feb 2018 08:46), Max: 98.8 (01 Feb 2018 08:46)  HR: 76 (01 Feb 2018 08:46) (76 - 108)  BP: 128/89 (01 Feb 2018 08:46) (128/89 - 135/90)  BP(mean): --  RR: 20 (01 Feb 2018 08:46) (18 - 20)  SpO2: --

## 2018-02-01 NOTE — PROGRESS NOTE BEHAVIORAL HEALTH - NSBHATTESTSEENBY_PSY_A_CORE
Attending Psychiatrist supervising NP/Trainee, meeting pt...
attending Psychiatrist without NP/Trainee
Attending Psychiatrist supervising NP/Trainee, meeting pt...
attending Psychiatrist without NP/Trainee
Attending Psychiatrist supervising NP/Trainee, meeting pt...

## 2018-02-01 NOTE — PROGRESS NOTE BEHAVIORAL HEALTH - NSBHADMITIPOBSFT_PSY_A_CORE
Patient not suicidal on unit; makes needs known.
.
Patient not suicidal on unit; makes needs known.

## 2018-02-01 NOTE — PROGRESS NOTE BEHAVIORAL HEALTH - NSBHADMITMEDEDUDETAILS_A_CORE FT
reviewed past response to Invega

## 2018-02-01 NOTE — PROGRESS NOTE BEHAVIORAL HEALTH - NSBHADMITCOORDCAREOTHER_PSY_A_CORE FT
creative arts and psychology staff

## 2018-02-01 NOTE — PROGRESS NOTE BEHAVIORAL HEALTH - NS ED BHA MSE SPEECH ARTICULATION
Impaired

## 2018-02-01 NOTE — PROGRESS NOTE BEHAVIORAL HEALTH - MODIFICATIONS
Invega titration and return to SUSTENNA; drug counselling and potentially a rehab program; encourage ADLs and group attendance
Invega titration SUSTENNA 234mg(1/24) and 156mg IM on 2/31;  drug counselling and potentially a rehab program; encourage ADLs and group attendance
Invega titration SUSTENNA 234mg(1/24) and 156mg IM on 1/31;  drug counselling and potentially a rehab program; encourage ADLs and group attendance
Invega titration and return to SUSTENNA; drug counselling and potentially a rehab program; encourage ADLs and group attendance
Invega titration SUSTENNA 234mg(1/24) and 156mg IM on 1/31;  drug counselling and potentially a rehab program; encourage ADLs and group attendance
Invega titration and return to SUSTENNA; drug counselling and potentially a rehab program; encourage ADLs and group attendance
Invega titration SUSTENNA 234mg(1/24) and 156mg IM on 2/31;  drug counselling and potentially a rehab program; encourage ADLs and group attendance
Invega titration and return to SUSTENNA; drug counselling and potentially a rehab program; encourage ADLs and group attendance
Invega titration and return to SUSTENNA; drug counselling and potentially a rehab program; encourage ADLs and group attendance

## 2018-02-01 NOTE — PROGRESS NOTE BEHAVIORAL HEALTH - CASE SUMMARY
see above  ;;1/18: sitting in TV room; internally preoccupied.  "The same"  AH command self-harm.  [place] ADLs; continue Invega titration.  At 6mg a day;  after a day will raise to 9mg a day and consider adding an antidepressant.
see above  ;;1/25: minimal improvement from yesterday but seen reading a book; will attempt to approach for PE ; some AH and SI;  avoids eye contact.
see above  ;;2/1: continues with no SI or HI or AVH; better eye contact; slightly better ADLs; focused on leaving today.  Sleep appetite good. no pain issues including the injection site; gait wnl; no tremor.
see above  ;;1/19: lying in bed; but does wake and look at the writer; acknowledges that voices are diminishing and less threatening ;  sleep appetite good; no pain issues; minimally verbal but does anser simple questions; has a strange smile.
see above  ;;1/26: no pain at injection site; sitting quietly smiling in the TV lounge.  staff reports limited improvement; still endorses SI and AH ;  little speech; fair adls.
see above  ;;1/17: titrating Invega up to 6mg tomorrow; some slight improvement in ADLs ; some group attendance; better eye contact; continues blocked but smiles strangely.
see above  ;;1/31: improved eye contact; focused on getting SUSTENA so he can clear tomorrow; discussed not using resources to buy drugs; smiles strangely; however denies SI or AVH.  slightly better ADLs.
see above  ;;1/22: sitting in the TV lounge; avoids eye contact; smiles strangely; quiet; internally preoccupied but voices are diminishing;  no SI or HI.  guarded and evasive.  No pain issues.  Sleep and appetite are good.
see above  ;;1/24: suggestion of incremental improvement with better eye contact; more verbalizations; less blocking ; reports of less SI and AH.  However isolative and still ambivalent about rehab.

## 2018-02-01 NOTE — PROGRESS NOTE BEHAVIORAL HEALTH - RISK ASSESSMENT
The patient is a chronic risk of self harm given transient living situation, ongoing substance use and non adherence to treatment. His acute risk is diminished given improvement on treatment regimen and no longer reporting SI or CAH.
The patient is a chronic risk of self harm given transient living situation, ongoing substance use and non adherence to treatment. Acute factors include overdose on heroin
The patient is a chronic risk of self harm given transient living situation, ongoing substance use and non adherence to treatment. His acute risk is somewhat diminished given improvement on treatment regimen and no longer reporting SI or CAH.
The patient is a chronic risk of self harm given transient living situation, ongoing substance use and non adherence to treatment. His acute risk is diminished given improvement on treatment regimen and no longer reporting SI or CAH.
The patient is a chronic risk of self harm given transient living situation, ongoing substance use and non adherence to treatment. Acute factors include overdose on heroin

## 2018-02-01 NOTE — PROGRESS NOTE BEHAVIORAL HEALTH - MUSCLE TONE / STRENGTH
Normal muscle tone/strength

## 2018-02-01 NOTE — PROGRESS NOTE BEHAVIORAL HEALTH - NSBHADMITIPBHPROVIDER_PSY_A_CORE
writer is a previous provider/N/A
N/A/writer is a previous provider
writer is a previous provider/N/A
writer is a previous provider/N/A
N/A/writer is a previous provider
writer is a previous provider/N/A
N/A/writer is a previous provider
writer is a previous provider/N/A
N/A/writer is a previous provider
N/A/writer is a previous provider

## 2018-02-01 NOTE — PROGRESS NOTE BEHAVIORAL HEALTH - NSBHFUPTYPE_PSY_A_CORE
Inpatient

## 2018-02-01 NOTE — PROGRESS NOTE BEHAVIORAL HEALTH - SUMMARY
52 year old man with PPHx of Heroin / Cocaine / EtOH use disorders and Bipolar vs schizoaffective disorder most recent episode depressed presents to ED BIB self for SI in the setting of heroin and cocaine use as well as medication noncompliance. Patient with history most consistent with antisocial personality disorder, but presents in crisis following acute intoxication. Secondary gain possible given pt's future oriented narrative (wants help with housing). Patient's auditory hallucinations and acute suicidality have resolved with the treatment, and he is indicating desire for abstinence. Patient felt stable and safe to go home, therefore discharge plan for today 02/01.

## 2018-02-01 NOTE — PROGRESS NOTE BEHAVIORAL HEALTH - NSBHADMITCOORDWITH_PSY_A_CORE
other/medical staff/social work
social work/other/medical staff
medical staff/social work/other
social work/medical staff/other
other/social work/medical staff
medical staff/social work/other
medical staff/social work/other
medical staff/other/social work
medical staff
other/social work/medical staff
medical staff/social work/other
medical staff/social work/other

## 2018-02-01 NOTE — PROGRESS NOTE BEHAVIORAL HEALTH - AFFECT CONGRUENCE
Congruent

## 2018-02-01 NOTE — PROGRESS NOTE BEHAVIORAL HEALTH - PRIMARY DX
Schizoaffective disorder, bipolar type

## 2018-02-01 NOTE — PROGRESS NOTE BEHAVIORAL HEALTH - PROBLEM SELECTOR PROBLEM 4
Chronic low back pain, unspecified back pain laterality, with sciatica presence unspecified

## 2018-02-01 NOTE — PROGRESS NOTE BEHAVIORAL HEALTH - NS ED BHA MED ROS GASTROINTESTINAL
No complaints

## 2018-02-01 NOTE — PROGRESS NOTE BEHAVIORAL HEALTH - PROBLEM SELECTOR PROBLEM 1
Suicidal ideation

## 2018-02-01 NOTE — PROGRESS NOTE BEHAVIORAL HEALTH - NSBHFUPINTERVALHXFT_PSY_A_CORE
Patient seen and examined. Case discussed with treatment team. Per nursing report, patient is discharge focused, compliant, attends groups.     Patient reports good mood, feels stable and safe to go home. Feels he has significantly improved overall during his course and is looking forward to being discharged. He does not have any significant complaints. He is motivated to maintain sobriety post-discharge. Denies depressed mood, SI, HI, thoughts of harm to self or others, s/s psychosis.

## 2018-02-01 NOTE — PROGRESS NOTE BEHAVIORAL HEALTH - NSBHADMITCOUNSEL_PSY_A_CORE
diagnostic results/impressions and/or recommended studies/importance of adherence to chosen treatment/risks and benefits of treatment options/instructions for management, treatment and follow up/risk factor reduction
instructions for management, treatment and follow up/risk factor reduction/importance of adherence to chosen treatment/diagnostic results/impressions and/or recommended studies/risks and benefits of treatment options
diagnostic results/impressions and/or recommended studies/risks and benefits of treatment options/importance of adherence to chosen treatment/instructions for management, treatment and follow up/risk factor reduction
risks and benefits of treatment options/diagnostic results/impressions and/or recommended studies/importance of adherence to chosen treatment/instructions for management, treatment and follow up/risk factor reduction
risks and benefits of treatment options/instructions for management, treatment and follow up/risk factor reduction/diagnostic results/impressions and/or recommended studies/importance of adherence to chosen treatment
importance of adherence to chosen treatment/risks and benefits of treatment options/diagnostic results/impressions and/or recommended studies/instructions for management, treatment and follow up/risk factor reduction
instructions for management, treatment and follow up/risk factor reduction/risks and benefits of treatment options/diagnostic results/impressions and/or recommended studies/importance of adherence to chosen treatment
instructions for management, treatment and follow up/risk factor reduction/risks and benefits of treatment options/diagnostic results/impressions and/or recommended studies/importance of adherence to chosen treatment
diagnostic results/impressions and/or recommended studies/instructions for management, treatment and follow up
importance of adherence to chosen treatment/instructions for management, treatment and follow up/risk factor reduction/diagnostic results/impressions and/or recommended studies/risks and benefits of treatment options
risk factor reduction/diagnostic results/impressions and/or recommended studies/importance of adherence to chosen treatment/risks and benefits of treatment options/instructions for management, treatment and follow up
risk factor reduction/diagnostic results/impressions and/or recommended studies/risks and benefits of treatment options/importance of adherence to chosen treatment/instructions for management, treatment and follow up

## 2018-02-01 NOTE — PROGRESS NOTE BEHAVIORAL HEALTH - NSBHADMITIPREASON_PSY_A_CORE
Danger to self; mental illness expected to respond to inpatient care
other reason
Danger to self; mental illness expected to respond to inpatient care
other reason
Danger to self; mental illness expected to respond to inpatient care

## 2018-02-01 NOTE — PROGRESS NOTE BEHAVIORAL HEALTH - PROBLEM SELECTOR PROBLEM 3
Substance use disorder

## 2018-02-01 NOTE — PROGRESS NOTE BEHAVIORAL HEALTH - NSBHFUPINTERVALCCFT_PSY_A_CORE
"sniffed a bag of dope"; reports feeling little anxious
"Can I leave tomorrow?"
"Am I going to Rehab?"; continues reporting minimal AH; denies neurovegetative s/s of depression
"feeling better"; denies GI symptoms and body aches
"feeling the same"; reports feeling low; denies GI symptoms, reports body aches
"Feeling anxious"; continues reporting minimal AH; denies neurovegetative s/s of depression; anxious in context of d/c-ing lorazepam
voices are diminished; discussed f/u SUSTENNA due tomorrow at 156mg IM.  sleep appetite ok.  "Better";  not conversational limited responses
sleep appetite ok.  Avoids discussion but does indicated that voices are lessening.  Responds with hand gestures and monosyllabic replies.  avoids eye contact;
“I am doing fine.”
"I am doing fine"; denies withdrawal s/s; continues reporting CAH although decreased frequency, last heard yesterday; continues to be methadone seeking
"I have been doing good"; feeling less anxious; continues to report minimal AH; denies neurovegetative s/s of depression
"I want to go home"

## 2018-02-01 NOTE — PROGRESS NOTE BEHAVIORAL HEALTH - LANGUAGE
No abnormalities noted

## 2018-02-01 NOTE — PROGRESS NOTE BEHAVIORAL HEALTH - NS ED BHA MSE GENERAL APPEARANCE
Deformities present

## 2018-02-01 NOTE — PROGRESS NOTE BEHAVIORAL HEALTH - NSBHLEGALSTATUS_PSY_A_CORE
9.13 (Voluntary)

## 2018-02-01 NOTE — PROGRESS NOTE BEHAVIORAL HEALTH - NSBHADMITIPOBS_PSY_A_CORE
Routine observation

## 2018-02-01 NOTE — PROGRESS NOTE BEHAVIORAL HEALTH - BODY HABITUS
Average build

## 2018-02-04 DIAGNOSIS — R45.851 SUICIDAL IDEATIONS: ICD-10-CM

## 2018-02-04 DIAGNOSIS — M54.5 LOW BACK PAIN: ICD-10-CM

## 2018-02-04 DIAGNOSIS — R55 SYNCOPE AND COLLAPSE: ICD-10-CM

## 2018-02-04 DIAGNOSIS — Z91.19 PATIENT'S NONCOMPLIANCE WITH OTHER MEDICAL TREATMENT AND REGIMEN: ICD-10-CM

## 2018-02-04 DIAGNOSIS — F25.0 SCHIZOAFFECTIVE DISORDER, BIPOLAR TYPE: ICD-10-CM

## 2018-02-04 DIAGNOSIS — F41.9 ANXIETY DISORDER, UNSPECIFIED: ICD-10-CM

## 2018-02-04 DIAGNOSIS — G89.29 OTHER CHRONIC PAIN: ICD-10-CM

## 2018-02-04 DIAGNOSIS — F60.89 OTHER SPECIFIC PERSONALITY DISORDERS: ICD-10-CM

## 2018-02-04 DIAGNOSIS — F14.99 COCAINE USE, UNSPECIFIED WITH UNSPECIFIED COCAINE-INDUCED DISORDER: ICD-10-CM

## 2018-02-04 DIAGNOSIS — F11.99 OPIOID USE, UNSPECIFIED WITH UNSPECIFIED OPIOID-INDUCED DISORDER: ICD-10-CM

## 2018-04-17 NOTE — PROGRESS NOTE BEHAVIORAL HEALTH - PROBLEM/PLAN-5
no chest pain, +cough, and no shortness of breath.
DISPLAY PLAN FREE TEXT

## 2018-04-27 NOTE — BEHAVIORAL HEALTH ASSESSMENT NOTE - NSBHADMITIPDSM_PSY_A_CORE
How Severe Are Your Spot(S)?: moderate
What Is The Reason For Today's Visit?: Full Body Skin Examination
What Is The Reason For Today's Visit? (Being Monitored For X): concerning skin lesions on an annual basis
see above for Axis I, II, III

## 2018-05-23 PROCEDURE — 99285 EMERGENCY DEPT VISIT HI MDM: CPT | Mod: 25

## 2018-05-23 PROCEDURE — 83036 HEMOGLOBIN GLYCOSYLATED A1C: CPT

## 2018-05-23 PROCEDURE — 82962 GLUCOSE BLOOD TEST: CPT

## 2018-05-23 PROCEDURE — 85025 COMPLETE CBC W/AUTO DIFF WBC: CPT

## 2018-05-23 PROCEDURE — 80307 DRUG TEST PRSMV CHEM ANLYZR: CPT

## 2018-05-23 PROCEDURE — 93005 ELECTROCARDIOGRAM TRACING: CPT

## 2018-05-23 PROCEDURE — 80061 LIPID PANEL: CPT

## 2018-05-23 PROCEDURE — 80048 BASIC METABOLIC PNL TOTAL CA: CPT

## 2018-05-23 PROCEDURE — 81001 URINALYSIS AUTO W/SCOPE: CPT

## 2018-05-23 PROCEDURE — 36415 COLL VENOUS BLD VENIPUNCTURE: CPT

## 2018-05-23 PROCEDURE — 70450 CT HEAD/BRAIN W/O DYE: CPT

## 2018-11-13 ENCOUNTER — INPATIENT (INPATIENT)
Facility: HOSPITAL | Age: 53
LOS: 16 days | Discharge: HOME | DRG: 885 | End: 2018-11-30
Attending: PSYCHIATRY & NEUROLOGY | Admitting: PSYCHIATRY & NEUROLOGY
Payer: MEDICAID

## 2018-11-13 VITALS
DIASTOLIC BLOOD PRESSURE: 83 MMHG | HEART RATE: 96 BPM | WEIGHT: 149.91 LBS | RESPIRATION RATE: 18 BRPM | TEMPERATURE: 98 F | SYSTOLIC BLOOD PRESSURE: 131 MMHG | OXYGEN SATURATION: 100 %

## 2018-11-13 DIAGNOSIS — F10.94 ALCOHOL USE, UNSPECIFIED WITH ALCOHOL-INDUCED MOOD DISORDER: ICD-10-CM

## 2018-11-13 DIAGNOSIS — F13.10 SEDATIVE, HYPNOTIC OR ANXIOLYTIC ABUSE, UNCOMPLICATED: ICD-10-CM

## 2018-11-13 DIAGNOSIS — F14.94 COCAINE USE, UNSPECIFIED WITH COCAINE-INDUCED MOOD DISORDER: ICD-10-CM

## 2018-11-13 DIAGNOSIS — F12.10 CANNABIS ABUSE, UNCOMPLICATED: ICD-10-CM

## 2018-11-13 LAB
ALBUMIN SERPL ELPH-MCNC: 3.6 G/DL — SIGNIFICANT CHANGE UP (ref 3.3–5)
ALBUMIN SERPL ELPH-MCNC: 4.1 G/DL — SIGNIFICANT CHANGE UP (ref 3.3–5)
ALP SERPL-CCNC: 108 U/L — SIGNIFICANT CHANGE UP (ref 40–120)
ALP SERPL-CCNC: 117 U/L — SIGNIFICANT CHANGE UP (ref 40–120)
ALT FLD-CCNC: 12 U/L — SIGNIFICANT CHANGE UP (ref 10–45)
ALT FLD-CCNC: 14 U/L — SIGNIFICANT CHANGE UP (ref 10–45)
AMPHET UR-MCNC: NEGATIVE — SIGNIFICANT CHANGE UP
ANION GAP SERPL CALC-SCNC: 12 MMOL/L — SIGNIFICANT CHANGE UP (ref 5–17)
ANISOCYTOSIS BLD QL: SLIGHT — SIGNIFICANT CHANGE UP
APAP SERPL-MCNC: <5 UG/ML — LOW (ref 10–30)
APPEARANCE UR: CLEAR — SIGNIFICANT CHANGE UP
AST SERPL-CCNC: 16 U/L — SIGNIFICANT CHANGE UP (ref 10–40)
AST SERPL-CCNC: 23 U/L — SIGNIFICANT CHANGE UP (ref 10–40)
BACTERIA # UR AUTO: PRESENT /HPF
BARBITURATES UR SCN-MCNC: NEGATIVE — SIGNIFICANT CHANGE UP
BASOPHILS NFR BLD AUTO: 1 % — SIGNIFICANT CHANGE UP (ref 0–2)
BENZODIAZ UR-MCNC: NEGATIVE — SIGNIFICANT CHANGE UP
BILIRUB DIRECT SERPL-MCNC: <0.2 MG/DL — SIGNIFICANT CHANGE UP (ref 0–0.2)
BILIRUB INDIRECT FLD-MCNC: SIGNIFICANT CHANGE UP MG/DL (ref 0.2–1)
BILIRUB SERPL-MCNC: <0.2 MG/DL — SIGNIFICANT CHANGE UP (ref 0.2–1.2)
BILIRUB SERPL-MCNC: <0.2 MG/DL — SIGNIFICANT CHANGE UP (ref 0.2–1.2)
BILIRUB UR-MCNC: NEGATIVE — SIGNIFICANT CHANGE UP
BUN SERPL-MCNC: 14 MG/DL — SIGNIFICANT CHANGE UP (ref 7–23)
CALCIUM SERPL-MCNC: 9 MG/DL — SIGNIFICANT CHANGE UP (ref 8.4–10.5)
CHLORIDE SERPL-SCNC: 103 MMOL/L — SIGNIFICANT CHANGE UP (ref 96–108)
CO2 SERPL-SCNC: 26 MMOL/L — SIGNIFICANT CHANGE UP (ref 22–31)
COCAINE METAB.OTHER UR-MCNC: POSITIVE
COLOR SPEC: YELLOW — SIGNIFICANT CHANGE UP
CREAT SERPL-MCNC: 0.78 MG/DL — SIGNIFICANT CHANGE UP (ref 0.5–1.3)
DIFF PNL FLD: ABNORMAL
EOSINOPHIL NFR BLD AUTO: 14 % — HIGH (ref 0–6)
EOSINOPHIL NFR BLD AUTO: 7 % — HIGH (ref 0–6)
EPI CELLS # UR: SIGNIFICANT CHANGE UP /HPF (ref 0–5)
ETHANOL SERPL-MCNC: <10 MG/DL — SIGNIFICANT CHANGE UP (ref 0–10)
GLUCOSE SERPL-MCNC: 101 MG/DL — HIGH (ref 70–99)
GLUCOSE UR QL: NEGATIVE — SIGNIFICANT CHANGE UP
HBA1C BLD-MCNC: 5.6 % — SIGNIFICANT CHANGE UP (ref 4–5.6)
HCT VFR BLD CALC: 25.3 % — LOW (ref 39–50)
HCT VFR BLD CALC: 25.3 % — LOW (ref 39–50)
HGB BLD-MCNC: 7.2 G/DL — LOW (ref 13–17)
HGB BLD-MCNC: 7.2 G/DL — LOW (ref 13–17)
HYPOCHROMIA BLD QL: SIGNIFICANT CHANGE UP
KETONES UR-MCNC: NEGATIVE — SIGNIFICANT CHANGE UP
LEUKOCYTE ESTERASE UR-ACNC: NEGATIVE — SIGNIFICANT CHANGE UP
LG PLATELETS BLD QL AUTO: PRESENT — SIGNIFICANT CHANGE UP
LYMPHOCYTES # BLD AUTO: 16 % — SIGNIFICANT CHANGE UP (ref 13–44)
LYMPHOCYTES # BLD AUTO: 26 % — SIGNIFICANT CHANGE UP (ref 13–44)
MACROCYTES BLD QL: SLIGHT — SIGNIFICANT CHANGE UP
MANUAL DIF COMMENT BLD-IMP: SIGNIFICANT CHANGE UP
MANUAL SMEAR VERIFICATION: SIGNIFICANT CHANGE UP
MCHC RBC-ENTMCNC: 19.3 PG — LOW (ref 27–34)
MCHC RBC-ENTMCNC: 19.6 PG — LOW (ref 27–34)
MCHC RBC-ENTMCNC: 28.5 G/DL — LOW (ref 32–36)
MCHC RBC-ENTMCNC: 28.5 G/DL — LOW (ref 32–36)
MCV RBC AUTO: 67.8 FL — LOW (ref 80–100)
MCV RBC AUTO: 68.8 FL — LOW (ref 80–100)
METHADONE UR-MCNC: NEGATIVE — SIGNIFICANT CHANGE UP
MICROCYTES BLD QL: SLIGHT — SIGNIFICANT CHANGE UP
MONOCYTES NFR BLD AUTO: 10 % — SIGNIFICANT CHANGE UP (ref 2–14)
MONOCYTES NFR BLD AUTO: 5 % — SIGNIFICANT CHANGE UP (ref 2–14)
NEUTROPHILS NFR BLD AUTO: 47 % — SIGNIFICANT CHANGE UP (ref 43–77)
NEUTROPHILS NFR BLD AUTO: 72 % — SIGNIFICANT CHANGE UP (ref 43–77)
NITRITE UR-MCNC: NEGATIVE — SIGNIFICANT CHANGE UP
OPIATES UR-MCNC: NEGATIVE — SIGNIFICANT CHANGE UP
OVALOCYTES BLD QL SMEAR: SLIGHT — SIGNIFICANT CHANGE UP
PCP SPEC-MCNC: SIGNIFICANT CHANGE UP
PCP UR-MCNC: NEGATIVE — SIGNIFICANT CHANGE UP
PH UR: 6.5 — SIGNIFICANT CHANGE UP (ref 5–8)
PLAT MORPH BLD: ABNORMAL
PLATELET # BLD AUTO: 612 K/UL — HIGH (ref 150–400)
PLATELET # BLD AUTO: 618 K/UL — HIGH (ref 150–400)
POIKILOCYTOSIS BLD QL AUTO: SLIGHT — SIGNIFICANT CHANGE UP
POLYCHROMASIA BLD QL SMEAR: SLIGHT — SIGNIFICANT CHANGE UP
POTASSIUM SERPL-MCNC: 4.2 MMOL/L — SIGNIFICANT CHANGE UP (ref 3.5–5.3)
POTASSIUM SERPL-SCNC: 4.2 MMOL/L — SIGNIFICANT CHANGE UP (ref 3.5–5.3)
PROT SERPL-MCNC: 6.8 G/DL — SIGNIFICANT CHANGE UP (ref 6–8.3)
PROT SERPL-MCNC: 7.4 G/DL — SIGNIFICANT CHANGE UP (ref 6–8.3)
PROT UR-MCNC: NEGATIVE MG/DL — SIGNIFICANT CHANGE UP
RBC # BLD: 3.68 M/UL — LOW (ref 4.2–5.8)
RBC # BLD: 3.73 M/UL — LOW (ref 4.2–5.8)
RBC # FLD: 17.8 % — HIGH (ref 10.3–16.9)
RBC # FLD: 18.1 % — HIGH (ref 10.3–16.9)
RBC BLD AUTO: ABNORMAL
RBC CASTS # UR COMP ASSIST: < 5 /HPF — SIGNIFICANT CHANGE UP
SALICYLATES SERPL-MCNC: <0.3 MG/DL — LOW (ref 2.8–20)
SMUDGE CELLS # BLD: SIGNIFICANT CHANGE UP
SODIUM SERPL-SCNC: 141 MMOL/L — SIGNIFICANT CHANGE UP (ref 135–145)
SP GR SPEC: 1.02 — SIGNIFICANT CHANGE UP (ref 1–1.03)
SPHEROCYTES BLD QL SMEAR: SLIGHT — SIGNIFICANT CHANGE UP
THC UR QL: POSITIVE
TOXIC GRANULES BLD QL SMEAR: SLIGHT — SIGNIFICANT CHANGE UP
TRANSFERRIN SERPL-MCNC: 294 MG/DL — SIGNIFICANT CHANGE UP (ref 200–360)
TSH SERPL-MCNC: 1.63 UIU/ML — SIGNIFICANT CHANGE UP (ref 0.35–4.94)
UROBILINOGEN FLD QL: 0.2 E.U./DL — SIGNIFICANT CHANGE UP
WBC # BLD: 10.3 K/UL — SIGNIFICANT CHANGE UP (ref 3.8–10.5)
WBC # BLD: 9.4 K/UL — SIGNIFICANT CHANGE UP (ref 3.8–10.5)
WBC # FLD AUTO: 10.3 K/UL — SIGNIFICANT CHANGE UP (ref 3.8–10.5)
WBC # FLD AUTO: 9.4 K/UL — SIGNIFICANT CHANGE UP (ref 3.8–10.5)
WBC UR QL: < 5 /HPF — SIGNIFICANT CHANGE UP

## 2018-11-13 PROCEDURE — 93010 ELECTROCARDIOGRAM REPORT: CPT

## 2018-11-13 PROCEDURE — 99053 MED SERV 10PM-8AM 24 HR FAC: CPT

## 2018-11-13 PROCEDURE — 90792 PSYCH DIAG EVAL W/MED SRVCS: CPT

## 2018-11-13 PROCEDURE — 99285 EMERGENCY DEPT VISIT HI MDM: CPT | Mod: 25

## 2018-11-13 RX ORDER — DIPHENHYDRAMINE HCL 50 MG
25 CAPSULE ORAL ONCE
Qty: 0 | Refills: 0 | Status: COMPLETED | OUTPATIENT
Start: 2018-11-13 | End: 2018-11-13

## 2018-11-13 RX ORDER — PERMETHRIN CREAM 5% W/W 50 MG/G
1 CREAM TOPICAL ONCE
Qty: 0 | Refills: 0 | Status: COMPLETED | OUTPATIENT
Start: 2018-11-13 | End: 2018-11-14

## 2018-11-13 RX ORDER — FOLIC ACID 0.8 MG
1 TABLET ORAL DAILY
Qty: 0 | Refills: 0 | Status: DISCONTINUED | OUTPATIENT
Start: 2018-11-13 | End: 2018-11-30

## 2018-11-13 RX ORDER — HALOPERIDOL DECANOATE 100 MG/ML
5 INJECTION INTRAMUSCULAR EVERY 6 HOURS
Qty: 0 | Refills: 0 | Status: DISCONTINUED | OUTPATIENT
Start: 2018-11-13 | End: 2018-11-30

## 2018-11-13 RX ORDER — THIAMINE MONONITRATE (VIT B1) 100 MG
100 TABLET ORAL DAILY
Qty: 0 | Refills: 0 | Status: DISCONTINUED | OUTPATIENT
Start: 2018-11-13 | End: 2018-11-30

## 2018-11-13 RX ADMIN — Medication 25 MILLIGRAM(S): at 09:02

## 2018-11-13 NOTE — ED BEHAVIORAL HEALTH ASSESSMENT NOTE - NS ED BHA DEMOGRAPHICS MEDICAL RECORD REVIEWED YES RECORDS
"Subjective   Eileen Carson is a 26 y.o. female who presents for a postpartum visit. She is 4 weeks postpartum following a spontaneous vaginal delivery. I have fully reviewed the prenatal and intrapartum course. The delivery was at 39-2 gestational weeks. Outcome: spontaneous vaginal delivery. Anesthesia: epidural. Postpartum course has been uncomplicated. Baby's course has been uncomplicated. Baby is feeding by breast. Bleeding no bleeding. Bowel function is normal. Bladder function is normal. Patient is sexually active. Contraception method is oral progesterone-only contraceptive. Postpartum depression screening: negative.    The following portions of the patient's history were reviewed and updated as appropriate: allergies, current medications, past family history, past medical history, past social history, past surgical history and problem list.    Review of Systems  Pertinent items are noted in HPI.    Objective   /64  Ht 167.6 cm (66\")  Wt 73.8 kg (162 lb 9.6 oz)  LMP 03/29/2017  BMI 26.24 kg/m2   General:  alert, appears stated age and cooperative    Vulva:  not evaluated   Vagina: not evaluated   Assessment/Plan   postpartum exam. Pap smear not done at today's visit.    1. Contraception: oral progesterone-only contraceptive  2. Flu exposure - tamiflu   3. Follow up in: 3 weeks or as needed.           " Hospital chart
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
## 2018-11-13 NOTE — ED BEHAVIORAL HEALTH ASSESSMENT NOTE - DESCRIPTION (FIRST USE, LAST USE, QUANTITY, FREQUENCY, DURATION)
unclear denying etoh now, but past charts indicate heavy use denying weed now, but past charts indicate use uses heavily, spent $800 on coke this week Denied ever using heroin, but past charts indicate he used to use a lot of heroin Denies use of benzos but positive for benzos on January UTOX

## 2018-11-13 NOTE — ED PROVIDER NOTE - PROGRESS NOTE DETAILS
h/h low pt denies blood in stool or black stools, cp, sob. Pt refusing rectal exam for guiac. Recommend iron

## 2018-11-13 NOTE — ED BEHAVIORAL HEALTH ASSESSMENT NOTE - HPI (INCLUDE ILLNESS QUALITY, SEVERITY, DURATION, TIMING, CONTEXT, MODIFYING FACTORS, ASSOCIATED SIGNS AND SYMPTOMS)
This is a 52 year old man with a PPHx of cocaine, heroin, etoh, and cannabis use disorders and self reported Bipolar most recent episode manic, who was BIB self due to SI to jump in front of train in context of cocaine relapse. The patient is an unreliable historian, often contradicting himself, and sometimes lying to me. For example he denied using any substances other than cocaine but our past charts indicate a well-documented history of heroin, etoh, and cannabis use. He denied any legal history but has been in jail for 18 months: charges unknown.  Patient has reported hx of bipolar and of schizoaffective but has also been given diagnosis of antisocial PD on our unit in the past. He reports one suicide attempt in the past when took twenty 300 mg neurontin pills in an od. He said that was his only attempt ever but old documentation reports he overdosed on zyprexa. He has had frequent similar presentations here where he had SI in the context of cocaine/heroin/cannabis/etoh use and has been admitted to [facility] thrice in past couple of years (1/15/18-2/1/18, 8/7/17-9/5/17, and 6/6/17-7/5/17. All three times he was medicated and has used Invega, zyprexa, depakote, zoloft, neurontin, and cymbalta here at [place]. He is also a frequent user of [facility] psychiatric services. He says most recently he had been on depakote and zyprexa prescribed at [facility] but he admits he is rarely adherent to psychiatric meds upon discharge. He has been to detox and rehab several times in the past. He voices interest in rehab to stop using cocaine but at the same time was not honest with me about use of other substances so I think he is precontemplative at best. Wants admission. Secondary gain/malingering is very likely. I think most likely diagnosis is polysubstance use d.o and substance induced mood d/o and/or psychosis.   The patient also reports using cocaine yesterday. The patient is never able to provide any collateral information as he states he has no friends or family, and reports that he has been living in [facility] shelter or on streets, panhandling for money to purchase more drugs. . The patient cannot provide the name of his most recent psychiatrist or the hospital he was discharged from. The patient endorses SI, with plan to jump in front of train, but also expresses future orientation stating that he wants to get help with housing. The patient denies any HI/PI/AVH. He does say he last heard voices yesterday. The patient denies manic or psychotic symptoms. No signs of etoh wdl but very itchy and has scratch or bite bhupinder scars on upper arms. Can't stop scratching his back and arms during interview. He says he recently had bug bites: "white bugs."Formication also very possible. 52 year old man with a PPHx of cocaine, heroin, etoh, and cannabis use disorders and self reported Bipolar most recent episode manic, who was BIB self due to SI to jump in front of train in context of cocaine relapse. The patient is an unreliable historian, often contradicting himself, and sometimes lying to me. For example he denied using any substances other than cocaine but our past charts indicate a well-documented history of heroin, etoh, and cannabis use. He denied any legal history but has been in [facility] for 18 months: charges unknown.  Patient has reported hx of bipolar and of schizoaffective but has also been given diagnosis of antisocial PD on our unit in the past. He reports one suicide attempt in the past when took twenty 300 mg neurontin pills in an od. He said that was his only attempt ever but old documentation reports he overdosed on zyprexa. He has had frequent similar presentations here where he had SI in the context of cocaine/heroin/cannabis/etoh use and has been admitted to [facility] thrice in past couple of years (1/15/18-2/1/18, 8/7/17-9/5/17, and 6/6/17-7/5/17. All three times he was medicated and has used Invega, zyprexa, depakote, zoloft, neurontin, and cymbalta here at [place]. He is also a frequent user of [facility] psychiatric services. He says most recently he had been on depakote and zyprexa prescribed at [facility] but he admits he is rarely adherent to psychiatric meds upon discharge. He has been to detox and rehab several times in the past. He voices interest in rehab to stop using cocaine but at the same time was not honest with me about use of other substances so I think he is precontemplative at best. Wants admission. Secondary gain/malingering is very likely. I think most likely diagnosis is polysubstance use d.o and substance induced mood d/o and/or psychosis.   The patient also reports using cocaine yesterday. The patient is never able to provide any collateral information as he states he has no friends or family, and reports that he has been living in [facility] shelter or on streets, panhandling for money to purchase more drugs. . The patient cannot provide the name of his most recent psychiatrist or the hospital he was discharged from. The patient endorses SI, with plan to jump in front of train, but also expresses future orientation stating that he wants to get help with housing. The patient denies any HI/PI/AVH. He does say he last heard voices yesterday. The patient denies manic or psychotic symptoms. No signs of etoh wdl but very itchy and has scratch or bite bhupinder scars on upper arms. Can't stop scratching his back and arms during interview. He says he recently had bug bites: "white bugs."Formication also very possible.

## 2018-11-13 NOTE — ED PROVIDER NOTE - MEDICAL DECISION MAKING DETAILS
53 yo m with pmh of bipolar and substance abuse c/o feeling suicidal x 3 days. Pt states his plan is to jump in front of a train. +cocaine use last night. Offers no physical complaints. PE unremarkable. labs, psyc eval

## 2018-11-13 NOTE — ED ADULT NURSE NOTE - OBJECTIVE STATEMENT
Received pt ambulatory from triage. No apparent distress. Denies CP. No SOB. cc of SI. Unkempt. Calm. Cooperative. Placed on 1:1.

## 2018-11-13 NOTE — ED ADULT NURSE NOTE - NSIMPLEMENTINTERV_GEN_ALL_ED
Implemented All Universal Safety Interventions:  [place] to call system. Call bell, personal items and telephone within reach. Instruct patient to call for assistance. Room bathroom lighting operational. Non-slip footwear when patient is off stretcher. Physically safe environment: no spills, clutter or unnecessary equipment. Stretcher in lowest position, wheels locked, appropriate side rails in place.

## 2018-11-13 NOTE — ED BEHAVIORAL HEALTH ASSESSMENT NOTE - OTHER
[facility] staff- Dr. De La Torre & Laura Solano, nursing manager Can't stop scratching himself on arms and back during interview Poorly kempt beard, xerotic lower extremities with scrape/scab on R foreleg, some swelling of LEs did not assess monotone constricted but did not seem particularly depressed

## 2018-11-13 NOTE — ED BEHAVIORAL HEALTH ASSESSMENT NOTE - OTHER PAST PSYCHIATRIC HISTORY (INCLUDE DETAILS REGARDING ONSET, COURSE OF ILLNESS, INPATIENT/OUTPATIENT TREATMENT)
Multiple past psych hospitalizations, 3 here, many at [facility]. Has often gotten outpt care at [facility]. 1 past SA by OD on either neurontin or zyprexa but I question whether it actually happened as details of story shift. Has been on zyprexa, invega, depakote, cymbalta, neurontin, and zoloft in past.

## 2018-11-13 NOTE — ED PROVIDER NOTE - OBJECTIVE STATEMENT
51 yo male h/o Heroin / Cocaine / EtOH use disorders and Bipolar vs schizoaffective disorder admitted in Feb 2018 for SI in the setting of heroin and cocaine use as well as medication noncompliance and felt to have antisocial personality disorder, returns to [facility] today c/o 53 yo m with pmh of bipolar and substance abuse c/o feeling suicidal x 3 days. Pt states his plan is to jump in front of a train. Pt reports suicide attempt in the past. Pt feeling upset because he relapsed last night with cocaine. Denies fever, chills, cp, sob, abd pain, n/v. Reports AH telling him to "leave the hospital and jump in front of a train."

## 2018-11-13 NOTE — ED BEHAVIORAL HEALTH ASSESSMENT NOTE - MEDICAL ISSUES AND PLAN (INCLUDE STANDING AND PRN MEDICATION)
Pt should be followed by medicine for new anemia with low MCV, abnormal platelet and RBC morphology, & increased MCHC, RDW

## 2018-11-13 NOTE — ED BEHAVIORAL HEALTH ASSESSMENT NOTE - AXIS IV
Economic problems/Problems with primary support/Problems with interaction with legal system/Housing problems

## 2018-11-13 NOTE — ED PROVIDER NOTE - ATTENDING CONTRIBUTION TO CARE
53 yo male h/o Heroin / Cocaine / EtOH use disorders and Bipolar vs schizoaffective disorder admitted in Feb 2018 for SI in the setting of heroin and cocaine use as well as medication noncompliance and felt to have antisocial personality disorder, returns to [facility] today c/o 53 yo male h/o Heroin / Cocaine / EtOH use disorders and Bipolar vs schizoaffective disorder admitted in Feb 2018 for SI in the setting of heroin and cocaine use as well as medication noncompliance and felt to have antisocial personality disorder, returns to [facility] today c/o SI and AH - voices telling him to jump in front of a train.  Pt denies hi, vh.  + cocaine abuse.  No physical complaint.  Well appearing, disheveled, nad, nc/at, nl resp effort, heart reg, navarro, no gross neuro deficits.  Pt placed on 1:1, psych consulted, plan labs/ekg for med clearance; dispo per psych.

## 2018-11-13 NOTE — ED BEHAVIORAL HEALTH ASSESSMENT NOTE - SUMMARY
53 YO M c polysubstance (cocaine/heroin/etoh/cannabis/benzo) use d/o, susbtance induced mood d/o & psychosis vs. bipolar or schizoaffective who regularly comes to [facility] with SI in context of substance use. Never adherent to outpt treatment or meds. Secondary gain, malingering, and antisocial traits suspected. 1)Pt can be admitted to [facility] on voluntary status if medically cleared. In light of pruritus and report of recent bug bites, bug infestation should be treated, and medical clearance needed in light of anemia and abnormal CBC results.   2)F/U UTOX results.   3)Would not give psychotropics at this time, as I suspect substance induced mood d/o/psychosis. I wonder if he needs psychotropics at all.   4)Can give haldol 5 mg q6h prn agitation. 53 YO M c polysubstance (cocaine/heroin/etoh/cannabis/benzo) use d/o, susbtance induced mood d/o & psychosis vs. bipolar or schizoaffective who regularly comes to [facility] with SI in context of substance use. Never adherent to outpt treatment or meds. Secondary gain, malingering, and antisocial traits suspected. 1)Pt can be admitted to [facility] on voluntary status if medically cleared. In light of pruritus and report of recent bug bites, bug infestation should be treated, and medical clearance needed in light of anemia and abnormal CBC results.   2)F/U UTOX results.   3)Would not give psychotropics at this time, as I suspect substance induced mood d/o/psychosis. I wonder if he needs psychotropics at all.   4)Can give haldol 5 mg q6h prn agitation.  5)CIWA & CINA

## 2018-11-13 NOTE — ED BEHAVIORAL HEALTH ASSESSMENT NOTE - DETAILS
current plan to jump in front of a train but I question his intent, +future-oriented thinking, suicidality very conditional on whether he will be admitted or not Pruritus A lot old scratch scars and new excoriations on upper arms, he says he had recent bug bites and contsantly scratching his back. spoke with [facility] nursing and with Max Lawson and Britt about patient self-referred

## 2018-11-13 NOTE — ED BEHAVIORAL HEALTH ASSESSMENT NOTE - DIFFERENTIAL
polysubstance (cocaine/heroin/etoh/cannabis/benzo) use d/o, susbtance induced mood d/o & psychosis vs. bipolar or schizoaffective who regularly comes to [facility] with SI in context of substance use. Never adherent to outpt treatment or meds. Secondary gain, malingering, and antisocial traits suspected.

## 2018-11-13 NOTE — CHART NOTE - NSCHARTNOTEFT_GEN_A_CORE
51 YO M c polysubstance (cocaine/heroin/etoh/cannabis/benzo) use d/o, susbtance induced mood d/o & psychosis vs. bipolar or schizoaffective who regularly comes to [facility] with SI in context of substance use. H and P note reviewed, labs reviewed (new onset anemia - will need med consult), and VS stable. Pt seen on unit sleeping.    MMSE: limited, as patient is currently asleep. He is aroused by voice - states he is fine but wants to sleep.   Physical Exam: reviewed from ED, no findings of concern    A/P:  51 YO M c polysubstance (cocaine/heroin/etoh/cannabis/benzo) use d/o, susbtance induced mood d/o & psychosis vs. bipolar or schizoaffective who regularly comes to [facility] with SI in context of substance use. Never adherent to outpt treatment or meds. Secondary gain, malingering, and antisocial traits suspected.    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 Secondary Dx 2 Cannabis abuse F12.10. Secondary Dx 3 Sedative abuse F13.10.    per CL team who saw patient earlier in day  - haldol 5 mg Q8H prn for severe agitation  - CIWA / withdrawal precautions  - hold starting standing psychotropic, monitoring symptom improvement with detox from substances.  - utox + MJ and cocaine

## 2018-11-14 DIAGNOSIS — F20.3 UNDIFFERENTIATED SCHIZOPHRENIA: ICD-10-CM

## 2018-11-14 DIAGNOSIS — D50.8 OTHER IRON DEFICIENCY ANEMIAS: ICD-10-CM

## 2018-11-14 DIAGNOSIS — B85.2 PEDICULOSIS, UNSPECIFIED: ICD-10-CM

## 2018-11-14 RX ORDER — RISPERIDONE 4 MG/1
0.5 TABLET ORAL DAILY
Qty: 0 | Refills: 0 | Status: DISCONTINUED | OUTPATIENT
Start: 2018-11-15 | End: 2018-11-16

## 2018-11-14 RX ADMIN — PERMETHRIN CREAM 5% W/W 1 APPLICATION(S): 50 CREAM TOPICAL at 07:28

## 2018-11-14 NOTE — BEHAVIORAL HEALTH ASSESSMENT NOTE - HPI (INCLUDE ILLNESS QUALITY, SEVERITY, DURATION, TIMING, CONTEXT, MODIFYING FACTORS, ASSOCIATED SIGNS AND SYMPTOMS)
52 year old man with a PPHx of cocaine, heroin, etoh, and cannabis use disorders and self reported Bipolar most recent episode manic, who was BIB self due to SI to jump in front of train in context of cocaine relapse. The patient is an unreliable historian, often contradicting himself, and sometimes lying to me. For example he denied using any substances other than cocaine but our past charts indicate a well-documented history of heroin, etoh, and cannabis use. He denied any legal history but has [facility] in [facility]: charges unknown.  Patient has reported hx of bipolar and of schizoaffective but has also been given diagnosis of antisocial PD on our unit in the past. He reports one suicide attempt in the past when took twenty 300 mg neurontin pills in an od. He said that was his only attempt ever but old documentation reports he overdosed on zyprexa. He has had frequent similar presentations here where he had SI in the context of cocaine/heroin/cannabis/etoh use and has been admitted to [facility] thrice in past couple of years (1/15/18-2/1/18, 8/7/17-9/5/17, and 6/6/17-7/5/17. All three times he was medicated and has used Invega, zyprexa, depakote, zoloft, neurontin, and cymbalta here at [place]. He is also a frequent user of [facility] psychiatric services. He says most recently he had been on depakote and zyprexa prescribed at [facility] but he admits he is rarely adherent to psychiatric meds upon discharge. He has been to detox and rehab several times in the past. He voices interest in rehab to stop using cocaine but at the same time was not honest with me about use of other substances so I think he is precontemplative at best. Wants admission. Secondary gain/malingering is very likely. I think most likely diagnosis is polysubstance use d.o and substance induced mood d/o and/or psychosis.   The patient also reports using cocaine yesterday. The patient is never able to provide any collateral information as he states he has no friends or family, and reports that he has been living in [facility] shelter or on streets, panhandling for money to purchase more drugs. . The patient cannot provide the name of his most recent psychiatrist or the hospital he was discharged from. The patient endorses SI, with plan to jump in front of train, but also expresses future orientation stating that he wants to get help with housing. The patient denies any HI/PI/AVH. He does say he last heard voices yesterday. The patient denies manic or psychotic symptoms. No signs of etoh wdl but very itchy and has scratch or bite bhupinder scars on upper arms. Can't stop scratching his back and arms during interview. He says he recently had bug bites: "white bugs."Formication also very possible.
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
## 2018-11-14 NOTE — BEHAVIORAL HEALTH ASSESSMENT NOTE - NSBHSUICRISKFACTOR_PSY_A_CORE
Hopelessness/Perceived burden on family and others/Substance abuse/dependence/Unable to engage in safety planning/Mood episode/Access to means (pills, firearms, etc.)

## 2018-11-14 NOTE — BEHAVIORAL HEALTH ASSESSMENT NOTE - PROBLEM SELECTOR PLAN 3
obtain Fe level; med consult done; may need supplemental Iron.;see interval and summary data for updates

## 2018-11-14 NOTE — BEHAVIORAL HEALTH ASSESSMENT NOTE - DETAILS
current plan to jump in front of a train but I question his intent, +future-oriented thinking, suicidality very conditional on whether he will be admitted or not Pruritus A lot old scratch scars and new excoriations on upper arms, he says he had recent bug bites and contsantly scratching his back.

## 2018-11-14 NOTE — BEHAVIORAL HEALTH ASSESSMENT NOTE - DIFFERENTIAL
polysubstance (cocaine/heroin/etoh/cannabis/benzo) use d/o, susbtance induced mood d/o & psychosis vs. bipolar or schizoaffective who regularly comes [facility] with SI in context of substance use. Never adherent to outpt treatment or meds. Secondary gain, malingering, and antisocial traits suspected.

## 2018-11-14 NOTE — BEHAVIORAL HEALTH ASSESSMENT NOTE - SUMMARY
53 YO M c polysubstance (cocaine/heroin/etoh/cannabis/benzo) use d/o, susbtance induced mood d/o & psychosis vs. bipolar or schizoaffective who regularly comes to [facility] with SI in context of substance use. Never adherent to outpt treatment or meds. Secondary gain, malingering, and antisocial traits suspected. 1)Pt can be admitted to [facility] on voluntary status if medically cleared. In light of pruritus and report of recent bug bites, bug infestation should be treated, and medical clearance needed in light of anemia and abnormal CBC results.   2)F/U UTOX results.   3)Would not give psychotropics at this time, as I suspect substance induced mood d/o/psychosis. I wonder if he needs psychotropics at all.   4)Can give haldol 5 mg q6h prn agitation.  5)CIWA & CINA

## 2018-11-15 LAB — T PALLIDUM AB TITR SER: POSITIVE

## 2018-11-15 PROCEDURE — 99232 SBSQ HOSP IP/OBS MODERATE 35: CPT

## 2018-11-15 RX ADMIN — Medication 100 MILLIGRAM(S): at 10:13

## 2018-11-15 RX ADMIN — RISPERIDONE 0.5 MILLIGRAM(S): 4 TABLET ORAL at 10:12

## 2018-11-15 RX ADMIN — Medication 1 MILLIGRAM(S): at 10:13

## 2018-11-15 RX ADMIN — Medication 1 TABLET(S): at 10:13

## 2018-11-15 NOTE — PROGRESS NOTE BEHAVIORAL HEALTH - DETAILS
Pruritus A lot old scratch scars and new excoriations on upper arms, he says he had recent bug bites and contsantly scratching his back.

## 2018-11-15 NOTE — PROGRESS NOTE BEHAVIORAL HEALTH - OTHER
Can't stop scratching himself on arms and back during interview Poorly kempt beard, xerotic lower extremities with scrape/scab on R foreleg, some swelling of LEs did not assess monotone constricted but did not seem particularly depressed

## 2018-11-15 NOTE — PROGRESS NOTE BEHAVIORAL HEALTH - NSBHFUPINTERVALHXFT_PSY_A_CORE
MSE: sitting mute in the back of the TV lounge; eyes shut at times; .  avoids eye contact.  appears disheveled; oriented ;; not cooperative with structured interview; acknowledges  auditory halluinations which comment on the patient;   preoccupied and blocked; mood sad; affect sad and constricted; guarded and evasive; i&j: poor.  accepts treatment; however not reflective on sxs or situation.   Very soft speech; little spontaneity; impoverished.  Very disheveled ; no longer  scratching at scalp.

## 2018-11-15 NOTE — PROGRESS NOTE BEHAVIORAL HEALTH - RISK ASSESSMENT
static: psychosis; substance abuse; past suicide attempts  modifiable: treat psychosis and medical comorbidieis (anemia); address sub abuse  protective: seeks help; wants to live;

## 2018-11-15 NOTE — PROGRESS NOTE BEHAVIORAL HEALTH - SUMMARY
At time of admission: 53 YO M c polysubstance (cocaine/heroin/etoh/cannabis/benzo) use d/o, susbtance induced mood d/o & psychosis vs. bipolar or schizoaffective who regularly comes to [facility] with SI in context of substance use. Never adherent to outpt treatment or meds. Secondary gain, malingering, and antisocial traits suspected. 1)Pt can be admitted to [facility] on voluntary status if medically cleared. In light of pruritus and report of recent bug bites, bug infestation should be treated, and medical clearance needed in light of anemia and abnormal CBC results.     ;;11/14: recieived Nix shampoo for pruritis ; concern about lice; [place]  Medicine is reviewing anemia with falling Hgb. (11.0 in Janunary and now 7.2)   no NVD; patient states he hears voices and has SI but acknowledges recent use of cocaine and depression that follows (Urine tox positive for cocaine and THC);   QTc 426ms;  CT head last January:  ;There is slight diffuse parenchymal loss. There is no hydrocephalus. ;There is cavum septum pellucidum et vergae, a normal variant. There is no ;acute intracranial hemorrhage. There is no mass effect, midline shift or ;extra axial collection ; may need to repeat .  Last January received Invega SUSTENNA 156mg IM and Neurontin 300mg po TID with good effect.   Because Invega is not forumulary may need to substitute Risperdal for now.  Start at low dose mid-day and titrate upwards for AH and mood.     ;;11/15: no longer scratching but almost mute; avoidant; has AH;  discussed restarting Risperdal for AH; patient who had initiailly declined states he will consent; w/u of anemia in progress. At time of admission: 51 YO M c polysubstance (cocaine/heroin/etoh/cannabis/benzo) use d/o, susbtance induced mood d/o & psychosis vs. bipolar or schizoaffective who regularly comes to [facility] with SI in context of substance use. Never adherent to outpt treatment or meds. Secondary gain, malingering, and antisocial traits suspected. 1)Pt can be admitted to [facility] on voluntary status if medically cleared. In light of pruritus and report of recent bug bites, bug infestation should be treated, and medical clearance needed in light of anemia and abnormal CBC results.     ;;11/14: received Nix shampoo for pruritis ; concern about lice; [place]  Medicine is reviewing anemia with falling Hgb. (11.0 in January and now 7.2)   no NVD; patient states he hears voices and has SI but acknowledges recent use of cocaine and depression that follows (Urine tox positive for cocaine and THC);   QTc 426ms;  CT head last January:  ;There is slight diffuse parenchymal loss. There is no hydrocephalus. ;There is cavum septum pellucidum et vergae, a normal variant. There is no ;acute intracranial hemorrhage. There is no mass effect, midline shift or ;extra axial collection ; may need to repeat .  Last January received Invega SUSTENNA 156mg IM and Neurontin 300mg po TID with good effect.   Because Invega is not formulary may need to substitute Risperdal for now.  Start at low dose mid-day and titrate upwards for AH and mood.     ;;11/15: no longer scratching but almost mute; avoidant; has AH;  discussed restarting Risperdal for AH; patient who had initially declined states he will consent; w/u of anemia in progress.

## 2018-11-15 NOTE — PROGRESS NOTE BEHAVIORAL HEALTH - NSBHFUPINTERVALCCFT_PSY_A_CORE
has been refusing meds but allowed labs;  would not answer any qustions but noded that he would take his medications.

## 2018-11-16 DIAGNOSIS — D50.9 IRON DEFICIENCY ANEMIA, UNSPECIFIED: ICD-10-CM

## 2018-11-16 DIAGNOSIS — F20.9 SCHIZOPHRENIA, UNSPECIFIED: ICD-10-CM

## 2018-11-16 PROCEDURE — 99232 SBSQ HOSP IP/OBS MODERATE 35: CPT

## 2018-11-16 RX ORDER — RISPERIDONE 4 MG/1
1 TABLET ORAL DAILY
Qty: 0 | Refills: 0 | Status: DISCONTINUED | OUTPATIENT
Start: 2018-11-17 | End: 2018-11-21

## 2018-11-16 RX ORDER — ACETAMINOPHEN 500 MG
650 TABLET ORAL EVERY 6 HOURS
Qty: 0 | Refills: 0 | Status: DISCONTINUED | OUTPATIENT
Start: 2018-11-16 | End: 2018-11-30

## 2018-11-16 RX ORDER — MAGNESIUM HYDROXIDE 400 MG/1
30 TABLET, CHEWABLE ORAL DAILY
Qty: 0 | Refills: 0 | Status: DISCONTINUED | OUTPATIENT
Start: 2018-11-16 | End: 2018-11-30

## 2018-11-16 RX ORDER — TRAZODONE HCL 50 MG
50 TABLET ORAL AT BEDTIME
Qty: 0 | Refills: 0 | Status: DISCONTINUED | OUTPATIENT
Start: 2018-11-16 | End: 2018-11-30

## 2018-11-16 RX ADMIN — Medication 1 TABLET(S): at 11:27

## 2018-11-16 RX ADMIN — Medication 1 MILLIGRAM(S): at 11:27

## 2018-11-16 RX ADMIN — Medication 100 MILLIGRAM(S): at 11:27

## 2018-11-16 RX ADMIN — RISPERIDONE 0.5 MILLIGRAM(S): 4 TABLET ORAL at 11:27

## 2018-11-16 NOTE — PROGRESS NOTE BEHAVIORAL HEALTH - NSBHFUPINTERVALCCFT_PSY_A_CORE
Says little;  little response to questions about sleep appetite or pain.  Appears to be taking medication;  says his back itches a little but not interested in having it examined.  Discussed need to understand and treat anemia.

## 2018-11-16 NOTE — PROGRESS NOTE BEHAVIORAL HEALTH - SUMMARY
At time of admission: 51 YO M c polysubstance (cocaine/heroin/etoh/cannabis/benzo) use d/o, susbtance induced mood d/o & psychosis vs. bipolar or schizoaffective who regularly comes to [facility] with SI in context of substance use. Never adherent to outpt treatment or meds. Secondary gain, malingering, and antisocial traits suspected. 1)Pt can be admitted to 8[facility] on voluntary status if medically cleared. In light of pruritus and report of recent bug bites, bug infestation should be treated, and medical clearance needed in light of anemia and abnormal CBC results.     ;;11/14: recieived Nix shampoo for pruritis ; concern about lice; [place].  Medicine is reviewing anemia with falling Hgb. (11.0 in Janunary and now 7.2)   no NVD; patient states he hears voices and has SI but acknowledges recent use of cocaine and depression that follows (Urine tox positive for cocaine and THC);   QTc 426ms;  CT head last January:  ;There is slight diffuse parenchymal loss. There is no hydrocephalus. ;There is cavum septum pellucidum et vergae, a normal variant. There is no ;acute intracranial hemorrhage. There is no mass effect, midline shift or ;extra axial collection ; may need to repeat .  Last January received Invega SUSTENNA 156mg IM and Neurontin 300mg po TID with good effect.   Because Invega is not forumulary may need to substitute Risperdal for now.  Start at low dose mid-day and titrate upwards for AH and mood.     ;;11/15: no longer scratching but almost mute; avoidant; has AH;  discussed restarting Risperdal for AH; patient who had initiailly declined states he will consent; w/u of anemia in progress.

## 2018-11-16 NOTE — PROGRESS NOTE BEHAVIORAL HEALTH - NSBHFUPINTERVALHXFT_PSY_A_CORE
MSE: lying in bed during breakfast time; does make some  eye contact.  almost mute; very soft speech at times unclear;  appears disheveled; oriented ;; not cooperative with structured interview; acknowledges  auditory halluinations which comment on the patient;   preoccupied and blocked; mood sad; affect sad and constricted; guarded and evasive; i&j: poor.  accepts treatment; however not reflective on sxs or situation.  ; impoverished thinking .

## 2018-11-17 RX ADMIN — Medication 100 MILLIGRAM(S): at 10:07

## 2018-11-17 RX ADMIN — RISPERIDONE 1 MILLIGRAM(S): 4 TABLET ORAL at 10:07

## 2018-11-17 RX ADMIN — Medication 1 MILLIGRAM(S): at 10:07

## 2018-11-17 RX ADMIN — Medication 1 TABLET(S): at 10:07

## 2018-11-18 RX ADMIN — Medication 1 MILLIGRAM(S): at 10:34

## 2018-11-18 RX ADMIN — Medication 1 TABLET(S): at 10:34

## 2018-11-18 RX ADMIN — Medication 100 MILLIGRAM(S): at 10:34

## 2018-11-18 RX ADMIN — RISPERIDONE 1 MILLIGRAM(S): 4 TABLET ORAL at 09:20

## 2018-11-18 RX ADMIN — Medication 50 MILLIGRAM(S): at 21:04

## 2018-11-19 PROCEDURE — 99232 SBSQ HOSP IP/OBS MODERATE 35: CPT

## 2018-11-19 PROCEDURE — 99221 1ST HOSP IP/OBS SF/LOW 40: CPT | Mod: GC

## 2018-11-19 RX ORDER — RISPERIDONE 4 MG/1
1 TABLET ORAL AT BEDTIME
Qty: 0 | Refills: 0 | Status: DISCONTINUED | OUTPATIENT
Start: 2018-11-19 | End: 2018-11-27

## 2018-11-19 RX ORDER — FERROUS SULFATE 325(65) MG
325 TABLET ORAL EVERY 8 HOURS
Qty: 0 | Refills: 0 | Status: DISCONTINUED | OUTPATIENT
Start: 2018-11-19 | End: 2018-11-30

## 2018-11-19 RX ADMIN — Medication 50 MILLIGRAM(S): at 21:03

## 2018-11-19 RX ADMIN — Medication 325 MILLIGRAM(S): at 21:03

## 2018-11-19 RX ADMIN — RISPERIDONE 1 MILLIGRAM(S): 4 TABLET ORAL at 21:03

## 2018-11-19 NOTE — PROGRESS NOTE BEHAVIORAL HEALTH - SUMMARY
At time of admission: 51 YO M c polysubstance (cocaine/heroin/etoh/cannabis/benzo) use d/o, susbtance induced mood d/o & psychosis vs. bipolar or schizoaffective who regularly comes to [facility] with SI in context of substance use. Never adherent to outpt treatment or meds. Secondary gain, malingering, and antisocial traits suspected. 1)Pt can be admitted to [facility] on voluntary status if medically cleared. In light of pruritus and report of recent bug bites, bug infestation should be treated, and medical clearance needed in light of anemia and abnormal CBC results.     ;;11/14: recieived Nix shampoo for pruritis ; concern about lice; [place].  Medicine is reviewing anemia with falling Hgb. (11.0 in Janunary and now 7.2)   no NVD; patient states he hears voices and has SI but acknowledges recent use of cocaine and depression that follows (Urine tox positive for cocaine and THC);   QTc 426ms;  CT head last January:  ;There is slight diffuse parenchymal loss. There is no hydrocephalus. ;There is cavum septum pellucidum et vergae, a normal variant. There is no ;acute intracranial hemorrhage. There is no mass effect, midline shift or ;extra axial collection ; may need to repeat .  Last January received Invega SUSTENNA 156mg IM and Neurontin 300mg po TID with good effect.   Because Invega is not forumulary may need to substitute Risperdal for now.  Start at low dose mid-day and titrate upwards for AH and mood.     ;;11/15: no longer scratching but almost mute; avoidant; has AH;  discussed restarting Risperdal for AH; patient who had initiailly declined states he will consent; w/u of anemia in progress.  11/19:  he is not noted to be scraching himself today.  still minimally verbal, will add hs dose risperdal.

## 2018-11-19 NOTE — PROGRESS NOTE BEHAVIORAL HEALTH - NSBHADMITCOUNSEL_PSY_A_CORE
diagnostic results/impressions and/or recommended studies/risk factor reduction
diagnostic results/impressions and/or recommended studies/risk factor reduction

## 2018-11-19 NOTE — PROGRESS NOTE BEHAVIORAL HEALTH - DETAILS
A lot old scratch scars and new excoriations on upper arms, he says he had recent bug bites and contsantly scratching his back. Pruritus

## 2018-11-19 NOTE — PROGRESS NOTE BEHAVIORAL HEALTH - NSBHFUPINTERVALHXFT_PSY_A_CORE
RN report received, patient is visible at interval, OOB for snack and medication, restless at times, received trazodone 50mg po prn last night for sleep.  Patient is minimally verbal and does not appear to be interested in interview.  He gives one or two word answers to most questions asked, said, "good" to state his mood, denied SI/Hi/AVH, but appears to be internally preoccupied.

## 2018-11-19 NOTE — CHART NOTE - NSCHARTNOTEFT_GEN_A_CORE
Mr. Quiroz was approached by Kavitha Gifford, PhD for the purposes of a CAMS assessment.     Mr. Quiroz was found in his room, lying in bed.  He declined to participate in the assessment or any kind of conversation, in spite of much encouragement.      The team will attempt to engage him again at a later date.

## 2018-11-20 LAB
HCT VFR BLD CALC: 26.2 % — LOW (ref 39–50)
HGB BLD-MCNC: 7.4 G/DL — LOW (ref 13–17)
MCHC RBC-ENTMCNC: 19.3 PG — LOW (ref 27–34)
MCHC RBC-ENTMCNC: 28.2 G/DL — LOW (ref 32–36)
MCV RBC AUTO: 68.4 FL — LOW (ref 80–100)
PLATELET # BLD AUTO: 490 K/UL — HIGH (ref 150–400)
RBC # BLD: 3.83 M/UL — LOW (ref 4.2–5.8)
RBC # FLD: 18.7 % — HIGH (ref 10.3–16.9)
WBC # BLD: 7 K/UL — SIGNIFICANT CHANGE UP (ref 3.8–10.5)
WBC # FLD AUTO: 7 K/UL — SIGNIFICANT CHANGE UP (ref 3.8–10.5)

## 2018-11-20 PROCEDURE — 99233 SBSQ HOSP IP/OBS HIGH 50: CPT | Mod: GC

## 2018-11-20 PROCEDURE — 99232 SBSQ HOSP IP/OBS MODERATE 35: CPT

## 2018-11-20 RX ADMIN — RISPERIDONE 1 MILLIGRAM(S): 4 TABLET ORAL at 21:10

## 2018-11-20 RX ADMIN — Medication 50 MILLIGRAM(S): at 21:10

## 2018-11-20 RX ADMIN — Medication 325 MILLIGRAM(S): at 21:10

## 2018-11-20 RX ADMIN — Medication 1 MILLIGRAM(S): at 09:52

## 2018-11-20 RX ADMIN — Medication 1 TABLET(S): at 09:51

## 2018-11-20 RX ADMIN — RISPERIDONE 1 MILLIGRAM(S): 4 TABLET ORAL at 09:51

## 2018-11-20 RX ADMIN — Medication 325 MILLIGRAM(S): at 14:59

## 2018-11-20 RX ADMIN — Medication 100 MILLIGRAM(S): at 09:51

## 2018-11-20 NOTE — PROGRESS NOTE BEHAVIORAL HEALTH - SUMMARY
At time of admission: 51 YO M c polysubstance (cocaine/heroin/etoh/cannabis/benzo) use d/o, susbtance induced mood d/o & psychosis vs. bipolar or schizoaffective who regularly comes to [facility] with SI in context of substance use. Never adherent to outpt treatment or meds. Secondary gain, malingering, and antisocial traits suspected. 1)Pt can be admitted to 8[facility] on voluntary status if medically cleared. In light of pruritus and report of recent bug bites, bug infestation should be treated, and medical clearance needed in light of anemia and abnormal CBC results.     ;;11/14: recieived Nix shampoo for pruritis ; concern about lice; [place].  Medicine is reviewing anemia with falling Hgb. (11.0 in Janunary and now 7.2)   no NVD; patient states he hears voices and has SI but acknowledges recent use of cocaine and depression that follows (Urine tox positive for cocaine and THC);   QTc 426ms;  CT head last January:  ;There is slight diffuse parenchymal loss. There is no hydrocephalus. ;There is cavum septum pellucidum et vergae, a normal variant. There is no ;acute intracranial hemorrhage. There is no mass effect, midline shift or ;extra axial collection ; may need to repeat .  Last January received Invega SUSTENNA 156mg IM and Neurontin 300mg po TID with good effect.   Because Invega is not forumulary may need to substitute Risperdal for now.  Start at low dose mid-day and titrate upwards for AH and mood.     ;;11/15: no longer scratching but almost mute; avoidant; has AH;  discussed restarting Risperdal for AH; patient who had initiailly declined states he will consent; w/u of anemia in progress.   ;;11/20: recieving iron supplements for anemnia;  a little more visible; affect just slightly less constricted;  shaved head; AH persist but no SI. At time of admission: 51 YO M c polysubstance (cocaine/heroin/etoh/cannabis/benzo) use d/o, susbtance induced mood d/o & psychosis vs. bipolar or schizoaffective who regularly comes to [facility] with SI in context of substance use. Never adherent to outpt treatment or meds. Secondary gain, malingering, and antisocial traits suspected. 1)Pt can be admitted to 8[facility] on voluntary status if medically cleared. In light of pruritus and report of recent bug bites, bug infestation should be treated, and medical clearance needed in light of anemia and abnormal CBC results.     ;;11/14: recieived Nix shampoo for pruritis ; concern about lice; [place]  Medicine is reviewing anemia with falling Hgb. (11.0 in January and now 7.2)   no NVD; patient states he hears voices and has SI but acknowledges recent use of cocaine and depression that follows (Urine tox positive for cocaine and THC);   QTc 426ms;  CT head last January:  ;There is slight diffuse parenchymal loss. There is no hydrocephalus. ;There is cavum septum pellucidum et vergae, a normal variant. There is no ;acute intracranial hemorrhage. There is no mass effect, midline shift or ;extra axial collection ; may need to repeat .  Last January received Invega SUSTENNA 156mg IM and Neurontin 300mg po TID with good effect.   Because Invega is not formulary may need to substitute Risperdal for now.  Start at low dose mid-day and titrate upwards for AH and mood.     ;;11/15: no longer scratching but almost mute; avoidant; has AH;  discussed restarting Risperdal for AH; patient who had initially declined states he will consent; w/u of anemia in progress.   ;;11/20: receiving iron supplements for anemia;  a little more visible; affect just slightly less constricted;  shaved head; AH persist but no SI.

## 2018-11-20 NOTE — PROGRESS NOTE BEHAVIORAL HEALTH - NSBHFUPINTERVALHXFT_PSY_A_CORE
Taking Fe supplements;  shaved head;  a little more visible;  MSE: lying in bed during breakfast time; does make some  eye contact.  almost mute; very soft speech at times unclear;  appears disheveled; oriented ;; not cooperative with structured interview; acknowledges  auditory halluinations which comment on the patient; however no SI at this time;     preoccupied and blocked; mood sad; affect sad and constricted; guarded and evasive; i&j: poor.  accepts treatment; however not reflective on sxs or situation.  ; impoverished thinking . Taking Fe supplements;  shaved head;  a little more visible;  MSE: lying in bed during breakfast time; does make some  eye contact.  almost mute; very soft speech at times unclear;  appears disheveled; oriented ;; not cooperative with structured interview; acknowledges  auditory hallucinations which comment on the patient; however no SI at this time;     preoccupied and blocked; mood sad; affect sad and constricted; guarded and evasive; i&j: poor.  accepts treatment; however not reflective on sxs or situation.  ; impoverished thinking .

## 2018-11-20 NOTE — PROGRESS NOTE BEHAVIORAL HEALTH - RISK ASSESSMENT
static: psychosis; substance abuse; past suicide attempts  modifiable: treat psychosis and medical comorbidities (anemia); address sub abuse; as implemented in the treatment plan  protective: seeks help; wants to live;  see interval and summary data for updates.

## 2018-11-21 PROCEDURE — 99233 SBSQ HOSP IP/OBS HIGH 50: CPT | Mod: GC

## 2018-11-21 PROCEDURE — 99232 SBSQ HOSP IP/OBS MODERATE 35: CPT

## 2018-11-21 RX ORDER — RISPERIDONE 4 MG/1
1.5 TABLET ORAL DAILY
Qty: 0 | Refills: 0 | Status: DISCONTINUED | OUTPATIENT
Start: 2018-11-21 | End: 2018-11-23

## 2018-11-21 RX ORDER — DOCUSATE SODIUM 100 MG
100 CAPSULE ORAL DAILY
Qty: 0 | Refills: 0 | Status: DISCONTINUED | OUTPATIENT
Start: 2018-11-21 | End: 2018-11-30

## 2018-11-21 RX ADMIN — Medication 100 MILLIGRAM(S): at 09:26

## 2018-11-21 RX ADMIN — Medication 1 MILLIGRAM(S): at 09:26

## 2018-11-21 RX ADMIN — RISPERIDONE 1 MILLIGRAM(S): 4 TABLET ORAL at 09:26

## 2018-11-21 RX ADMIN — Medication 1 TABLET(S): at 09:26

## 2018-11-21 RX ADMIN — RISPERIDONE 1 MILLIGRAM(S): 4 TABLET ORAL at 21:15

## 2018-11-21 RX ADMIN — Medication 50 MILLIGRAM(S): at 21:15

## 2018-11-21 NOTE — PROGRESS NOTE BEHAVIORAL HEALTH - SUMMARY
At time of admission: 53 YO M c polysubstance (cocaine/heroin/etoh/cannabis/benzo) use d/o, susbtance induced mood d/o & psychosis vs. bipolar or schizoaffective who regularly comes to [facility] with SI in context of substance use. Never adherent to outpt treatment or meds. Secondary gain, malingering, and antisocial traits suspected. 1)Pt can be admitted to 8[facility] on voluntary status if medically cleared. In light of pruritus and report of recent bug bites, bug infestation should be treated, and medical clearance needed in light of anemia and abnormal CBC results.     ;;11/14: recieived Nix shampoo for pruritis ; concern about lice; [place].  Medicine is reviewing anemia with falling Hgb. (11.0 in Janunary and now 7.2)   no NVD; patient states he hears voices and has SI but acknowledges recent use of cocaine and depression that follows (Urine tox positive for cocaine and THC);   QTc 426ms;  CT head last January:  ;There is slight diffuse parenchymal loss. There is no hydrocephalus. ;There is cavum septum pellucidum et vergae, a normal variant. There is no ;acute intracranial hemorrhage. There is no mass effect, midline shift or ;extra axial collection ; may need to repeat .  Last January received Invega SUSTENNA 156mg IM and Neurontin 300mg po TID with good effect.   Because Invega is not forumulary may need to substitute Risperdal for now.  Start at low dose mid-day and titrate upwards for AH and mood.     ;;11/15: no longer scratching but almost mute; avoidant; has AH;  discussed restarting Risperdal for AH; patient who had initiailly declined states he will consent; w/u of anemia in progress.   ;;11/20: recieving iron supplements for anemnia;  a little more visible; affect just slightly less constricted;  shaved head; AH persist but no SI.   ;;11/21:  some suggestion of improved mood and less isolative; raising Risperdal to 1.5mg in am and 1mg in pm for AH and psychotic withdrawal.  Denies SI.

## 2018-11-21 NOTE — PROGRESS NOTE BEHAVIORAL HEALTH - NSBHFUPINTERVALCCFT_PSY_A_CORE
no sleep appetite or pain complaints.  Mostly silent.  difficulty to converse with.  However no SI but some AH.

## 2018-11-21 NOTE — PROGRESS NOTE BEHAVIORAL HEALTH - NSBHFUPINTERVALHXFT_PSY_A_CORE
seen as improving slightly  ;  MSE: active holding tray during breakfast time  ; does make some  eye contact.  almost mute; very soft speech at times unclear;  appears disheveled; oriented ;; not cooperative with structured interview; acknowledges  auditory halluinations which comment on the patient; however no SI at this time;   smiles slightly at moments;     preoccupied and blocked; mood sad; affect sad and constricted; guarded and evasive; i&j: poor.  accepts treatment; however not reflective on sxs or situation.  ; impoverished thinking . seen as improving slightly  ;  MSE: active holding tray during breakfast time  ; does make some  eye contact.  almost mute; very soft speech at times unclear;  appears disheveled; oriented ;; not cooperative with structured interview; acknowledges  auditory hallucinations which comment on the patient; however no SI at this time;   smiles slightly at moments;     preoccupied and blocked; mood sad; affect sad and constricted; guarded and evasive; i&j: poor.  accepts treatment; however not reflective on sxs or situation.  ; impoverished thinking .

## 2018-11-22 RX ADMIN — Medication 100 MILLIGRAM(S): at 14:57

## 2018-11-22 RX ADMIN — Medication 100 MILLIGRAM(S): at 09:35

## 2018-11-22 RX ADMIN — RISPERIDONE 1 MILLIGRAM(S): 4 TABLET ORAL at 21:04

## 2018-11-22 RX ADMIN — Medication 325 MILLIGRAM(S): at 21:04

## 2018-11-22 RX ADMIN — Medication 325 MILLIGRAM(S): at 14:58

## 2018-11-22 RX ADMIN — Medication 50 MILLIGRAM(S): at 21:05

## 2018-11-22 RX ADMIN — Medication 1 TABLET(S): at 14:58

## 2018-11-22 RX ADMIN — RISPERIDONE 1.5 MILLIGRAM(S): 4 TABLET ORAL at 09:35

## 2018-11-22 RX ADMIN — Medication 1 MILLIGRAM(S): at 14:58

## 2018-11-23 PROCEDURE — 99232 SBSQ HOSP IP/OBS MODERATE 35: CPT

## 2018-11-23 RX ORDER — RISPERIDONE 4 MG/1
2 TABLET ORAL DAILY
Qty: 0 | Refills: 0 | Status: DISCONTINUED | OUTPATIENT
Start: 2018-11-23 | End: 2018-11-30

## 2018-11-23 RX ADMIN — Medication 100 MILLIGRAM(S): at 10:26

## 2018-11-23 RX ADMIN — Medication 1 MILLIGRAM(S): at 10:26

## 2018-11-23 RX ADMIN — Medication 1 TABLET(S): at 10:26

## 2018-11-23 RX ADMIN — Medication 325 MILLIGRAM(S): at 10:26

## 2018-11-23 RX ADMIN — RISPERIDONE 2 MILLIGRAM(S): 4 TABLET ORAL at 10:26

## 2018-11-23 NOTE — PROGRESS NOTE BEHAVIORAL HEALTH - NSBHFUPINTERVALHXFT_PSY_A_CORE
MSE: fair eye contact; watching TV in the day lounge.  appears disheveled; alert, oriented, cognition intact.  not cooperative with structured interview; speech soft; not spontaneous; unclear at times; has voices which comment.  appears blocked and intermally preoccupied.  mood sad; affect sad and constricted; guarded and evasive.  i&j: poor.  accepts treatment; however not reflective on sxs or situation.

## 2018-11-23 NOTE — PROGRESS NOTE BEHAVIORAL HEALTH - NSBHFUPINTERVALCCFT_PSY_A_CORE
reports good sleep and appetite.  avoids discussions.  no complaints of pain.  Discussed need to continue taking Iron pills.  Patient only smiles.

## 2018-11-23 NOTE — PROGRESS NOTE BEHAVIORAL HEALTH - RISK ASSESSMENT
13 Pertinent Items   ---xxx  Risk elements: cocaine;etoh;schizoaffective;suicidality (current/past) yes;depressed;manic;bipolar;voluntary;hit;rehab;non-compliant;oriented to situation yes;overdose;  Static: substance abuse/dependance;chronic psychiatric disorder;past suicide attempts;mood issues;mood episodes;;past aggressiveness;past psychiatriuc disorder;;  Modifiable: substance abuse counselling and treatiment;treatment of psychotic sxs;treat underlying mood issues;;reduce aggressive potential with treatment;address subsgtance dependency issues;address/enhance treratment compliance and supervision;;assess/treat underlying mood issues;  Protective: seeks help;cognitively able to engage in treatment;  Modifiable factors addressed in treatment plan; see summary and interval data for updates 14 Pertinent Items   ---xxx  Risk elements: cocaine yes; etoh; schizoaffective; suicidality (current/past) yes; depressed; manic; bipolar; voluntary; hit; rehab; non-compliant; oriented to situation yes; overdose; alcohol yes;   Static: substance abuse/dependance; chronic psychiatric disorder; past suicide attempts; mood issues; mood episodes; ; past aggressiveness; past psychiatric disorder; ;   Modifiable: substance abuse counselling and treatment; substance abuse counselling and treatiment; treatment of psychotic sxs; treat underlying mood issues; ; reduce aggressive potential with treatment; address subsgtance dependency issues; address/enhance treatment compliance and supervision; ; assess/treat underlying mood issues; ;   Protective: seeks help; cognitively able to engage in treatment;   Modifiable factors addressed in treatment plan; see summary and interval data for updates 14 Pertinent Items   ---xxx  Risk elements: cocaine yes; etoh; schizoaffective; suicidality (current/past) yes; depressed; manic; bipolar; voluntary; hit; rehab; non-compliant; oriented to situation yes; overdose; alcohol yes;   Static: substance abuse/dependance; chronic psychiatric disorder; past suicide attempts; mood issues; mood episodes; ; past aggressiveness; past psychiatric disorder; ;   Modifiable: substance abuse counselling and treatment; substance abuse counselling and treatiment; treatment of psychotic sxs; treat underlying mood issues; ; reduce aggressive potential with treatment; address substance dependency issues; address/enhance treatment compliance and supervision; ; assess/treat underlying mood issues; ;   Protective: seeks help; cognitively able to engage in treatment;   Modifiable factors addressed in treatment plan; see summary and interval data for updates

## 2018-11-23 NOTE — PROGRESS NOTE BEHAVIORAL HEALTH - SUMMARY
At time of admission: 53 YO M c polysubstance (cocaine/heroin/etoh/cannabis/benzo) use d/o, susbtance induced mood d/o & psychosis vs. bipolar or schizoaffective who regularly comes to [facility] with SI in context of substance use. Never adherent to outpt treatment or meds. Secondary gain, malingering, and antisocial traits suspected. 1)Pt can be admitted to 8[facility] on voluntary status if medically cleared. In light of pruritus and report of recent bug bites, bug infestation should be treated, and medical clearance needed in light of anemia and abnormal CBC results.     ;;11/14: recieived Nix shampoo for pruritis ; concern about lice; [place].  Medicine is reviewing anemia with falling Hgb. (11.0 in Janunary and now 7.2)   no NVD; patient states he hears voices and has SI but acknowledges recent use of cocaine and depression that follows (Urine tox positive for cocaine and THC);   QTc 426ms;  CT head last January:  ;There is slight diffuse parenchymal loss. There is no hydrocephalus. ;There is cavum septum pellucidum et vergae, a normal variant. There is no ;acute intracranial hemorrhage. There is no mass effect, midline shift or ;extra axial collection ; may need to repeat .  Last January received Invega SUSTENNA 156mg IM and Neurontin 300mg po TID with good effect.   Because Invega is not forumulary may need to substitute Risperdal for now.  Start at low dose mid-day and titrate upwards for AH and mood.     ;;11/15: no longer scratching but almost mute; avoidant; has AH;  discussed restarting Risperdal for AH; patient who had initiailly declined states he will consent; w/u of anemia in progress.   ;;11/20: recieving iron supplements for anemnia;  a little more visible; affect just slightly less constricted;  shaved head; AH persist but no SI.   ;;11/21:  some suggestion of improved mood and less isolative; raising Risperdal to 1.5mg in am and 1mg in pm for AH and psychotic withdrawal.  Denies SI.   ;;11/23: refusied  Ferrous Sulfate but appears to have resumed compliance. ;  smiles when writer discussing this.  isolative however better eye contact; AH still present.   Raising Risperdal to 2mg in am and 1mg at night for AH and psychotic sxs.

## 2018-11-24 RX ADMIN — Medication 100 MILLIGRAM(S): at 09:25

## 2018-11-24 RX ADMIN — RISPERIDONE 2 MILLIGRAM(S): 4 TABLET ORAL at 09:25

## 2018-11-24 RX ADMIN — RISPERIDONE 1 MILLIGRAM(S): 4 TABLET ORAL at 20:44

## 2018-11-24 RX ADMIN — Medication 1 MILLIGRAM(S): at 09:25

## 2018-11-24 RX ADMIN — Medication 1 TABLET(S): at 09:25

## 2018-11-24 RX ADMIN — Medication 50 MILLIGRAM(S): at 00:27

## 2018-11-24 RX ADMIN — Medication 325 MILLIGRAM(S): at 20:44

## 2018-11-25 RX ADMIN — Medication 100 MILLIGRAM(S): at 09:59

## 2018-11-25 RX ADMIN — Medication 1 TABLET(S): at 10:00

## 2018-11-25 RX ADMIN — Medication 325 MILLIGRAM(S): at 15:24

## 2018-11-25 RX ADMIN — Medication 50 MILLIGRAM(S): at 00:19

## 2018-11-25 RX ADMIN — RISPERIDONE 2 MILLIGRAM(S): 4 TABLET ORAL at 09:58

## 2018-11-25 RX ADMIN — Medication 325 MILLIGRAM(S): at 21:19

## 2018-11-25 RX ADMIN — Medication 1 MILLIGRAM(S): at 10:00

## 2018-11-25 RX ADMIN — Medication 50 MILLIGRAM(S): at 21:20

## 2018-11-25 RX ADMIN — Medication 100 MILLIGRAM(S): at 10:00

## 2018-11-25 RX ADMIN — RISPERIDONE 1 MILLIGRAM(S): 4 TABLET ORAL at 21:19

## 2018-11-26 PROCEDURE — 99232 SBSQ HOSP IP/OBS MODERATE 35: CPT

## 2018-11-26 RX ADMIN — Medication 1 TABLET(S): at 09:32

## 2018-11-26 RX ADMIN — Medication 325 MILLIGRAM(S): at 07:02

## 2018-11-26 RX ADMIN — Medication 325 MILLIGRAM(S): at 21:19

## 2018-11-26 RX ADMIN — Medication 325 MILLIGRAM(S): at 14:12

## 2018-11-26 RX ADMIN — RISPERIDONE 1 MILLIGRAM(S): 4 TABLET ORAL at 21:19

## 2018-11-26 RX ADMIN — Medication 100 MILLIGRAM(S): at 09:32

## 2018-11-26 RX ADMIN — Medication 1 MILLIGRAM(S): at 09:32

## 2018-11-26 RX ADMIN — Medication 50 MILLIGRAM(S): at 21:19

## 2018-11-26 RX ADMIN — RISPERIDONE 2 MILLIGRAM(S): 4 TABLET ORAL at 09:32

## 2018-11-26 NOTE — PROGRESS NOTE BEHAVIORAL HEALTH - NSBHFUPINTERVALCCFT_PSY_A_CORE
says he feels feverish and it is hard to talk;  not answering most questions but appears not to have eaten food on the desk in his room.  Complains of left shoulder pain where he states "They gave me an injection". sleep appetite ok. no pain issues;  looks forward to leaving later this week.

## 2018-11-26 NOTE — PROGRESS NOTE BEHAVIORAL HEALTH - NSBHFUPINTERVALHXFT_PSY_A_CORE
MSE: fair eye contact; some replies ; no mention of AH or s/h i/i/p;  anxious ; blocked; no tremor observed;  complains of shoulder pain; somewhat oriented to place;  loud not spontaneous speech; (however has some hearing impairment).  Poor judgment; no insight.    Spoke to Ms. Blakemore of epidemiology and encouraged to order varicell and measles titrer despite no fever or conjunctivitis.  Attempting to obtain dermatology and ID consult. sits with staff and cooperates with CAMs interview.     MSE: better  eye contact; .  appears a little less disheveled; alert, oriented, cognition intact.  not cooperative with structured interview; speech soft; not spontaneous; unclear at times; voices are gone.  no SI. .  appears  less  blocked and not as  intermally preoccupied.  mood anxious  affect anxious  and constricted; still rather guarded and evasive.  i&j: poor.  accepting  treatment; however not reflective on sxs or situation. sits with staff and cooperates with CAMs interview.     MSE: better  eye contact; .  appears a little less disheveled; alert, oriented, cognition intact.  not cooperative with structured interview; speech soft; not spontaneous; unclear at times; voices are gone.  no SI. .  appears  less  blocked and not as  internally preoccupied.  mood anxious  affect anxious  and constricted; still rather guarded and evasive.  i&j: poor.  accepting  treatment; however not reflective on sxs or situation.

## 2018-11-26 NOTE — PROGRESS NOTE BEHAVIORAL HEALTH - RISK ASSESSMENT
Risk elements: cocaine yes; etoh; schizoaffective; suicidality (current/past) yes; depressed; manic; bipolar; voluntary; hit; rehab; non-compliant; oriented to situation yes; overdose; alcohol yes;     Static: substance abuse/dependance; chronic psychiatric disorder; past suicide attempts; mood issues; mood episodes; ; past aggressiveness; past psychiatric disorder; ;     Modifiable: substance abuse counselling and treatment; substance abuse counselling and treatiment; treatment of psychotic sxs; treat underlying mood issues; ; reduce aggressive potential with treatment; address substance dependency issues; address/enhance treatment compliance and supervision; ; assess/treat underlying mood issues; ;     Protective: seeks help; cognitively able to engage in treatment;   Modifiable factors addressed in treatment plan; see summary and interval data for updates

## 2018-11-26 NOTE — CHART NOTE - NSCHARTNOTEFT_GEN_A_CORE
CAMS initial assessment and stabilization plan completed by Kavitha Gifford, PhD, Postdoctoral Fellow.  Documentation is in the patient's chart.

## 2018-11-26 NOTE — PROGRESS NOTE BEHAVIORAL HEALTH - SUMMARY
81 yo male retired, domiciled alone, h/o psychiatric admissions, last at [facility] in July 2017 for similar presentation, diagnosed with major cognitive d/o with psychosis at the time and was discharged on Risperdal 1mg po bid.  Patient was bib EMS after police called by son in law today earlier for erratic behavior, worsening paranoia and delusions.  Per Son-in law, Alfredo Sheppard, [phone], patient's behavioral and psychiatric condition became out of control at least for the past 1 month, he has not be adherence to psychiatric meds as well as other medication for physical condition including ones for his chronic rash.  For the past 1 month, patient has been increasingly paranoid, delusional, believes he owns the building where he rented an apt and stopped paying rent, currently getting evicted by landlord.  There were multiple complaints made by neighbors due to erratic behavior, leaving apt middle of night aimlessly.  He also voiced paranoid delusion such as people are poisoning him, trying to harm him etc. Current presentation is most consistent with major neurocognitive d/o with behavioral disturbance in context of medication noncompliance.  He has impaired insight/judgment/inability to care for self warranting involuntary admission for stabilization.    ;;11/23:  knows he is in a hosptial but disoriented to date and as cognitive assessment proceeded became angry and paranoid "You are [employer] and you are going to give me a bad report!"  refused further interview;  Restarting Risperdal for paranoid psychosis in the context of dementia.  Patient has petichial  lesions on chest and trunk ; seen by medicine; thought to have scabies;  Pernepherine cream ordered; patient on contact isolation.  To be applied and left for 12 hours then washed.  Review with medicine priror to d/c contact precautions (isolation).   In view of reports of elevated systolic bp will review with medicine prior to restarting antihypertensives.     ;;11/26: afebrile but rash perists after treatment for possible scabies with permethrin.  off of CO.  not eating ; spotty compliance with Risperdal.  Obtaining ID and derm consult ; obtaining titers for varicella and measles in view of appearance of rash  (however not febrile or pruritic; no conjuncitivits).  Considering treatment over objection if compliance with antipsychotic for paranoid (and routine lab work)  is more consistently refused.   Patient later seen by Dr. Perkins dermatologist.  Diagnosis of pemphigus folliculaceous made ; more benign than vulgaris version insofar as there is no buccal involvement.  Dapsone can be started at 80mg if used previously.  Monitor LFTs and monitor for hemolytic anemia. At time of admission: 51 YO M c polysubstance (cocaine/heroin/etoh/cannabis/benzo) use d/o, susbtance induced mood d/o & psychosis vs. bipolar or schizoaffective who regularly comes to [facility] with SI in context of substance use. Never adherent to outpt treatment or meds. Secondary gain, malingering, and antisocial traits suspected. 1)Pt can be admitted to [facility] on voluntary status if medically cleared. In light of pruritus and report of recent bug bites, bug infestation should be treated, and medical clearance needed in light of anemia and abnormal CBC results.     ;;11/14: recieived Nix shampoo for pruritis ; concern about lice; [place].  Medicine is reviewing anemia with falling Hgb. (11.0 in Janunary and now 7.2)   no NVD; patient states he hears voices and has SI but acknowledges recent use of cocaine and depression that follows (Urine tox positive for cocaine and THC);   QTc 426ms;  CT head last January:  ;There is slight diffuse parenchymal loss. There is no hydrocephalus. ;There is cavum septum pellucidum et vergae, a normal variant. There is no ;acute intracranial hemorrhage. There is no mass effect, midline shift or ;extra axial collection ; may need to repeat .  Last January received Invega SUSTENNA 156mg IM and Neurontin 300mg po TID with good effect.   Because Invega is not forumulary may need to substitute Risperdal for now.  Start at low dose mid-day and titrate upwards for AH and mood.     ;;11/15: no longer scratching but almost mute; avoidant; has AH;  discussed restarting Risperdal for AH; patient who had initially declined states he will consent; w/u of anemia in progress.   ;;11/20: receiving iron supplements for anemia;  a little more visible; affect just slightly less constricted;  shaved head; AH persist but no SI.   ;;11/21:  some suggestion of improved mood and less isolative; raising Risperdal to 1.5mg in am and 1mg in pm for AH and psychotic withdrawal.  Denies SI.   ;;11/23: refused  Ferrous Sulfate but appears to have resumed compliance. ;  smiles when writer discussing this.  isolative however better eye contact; AH still present.   Raising Risperdal to 2mg in am and 1mg at night for AH and psychotic sxs.    ;;11/26: not as preoccupied; AH are gone; SI not present;  somewhat better related;  allowing CAMS interview.

## 2018-11-27 PROCEDURE — 99232 SBSQ HOSP IP/OBS MODERATE 35: CPT

## 2018-11-27 RX ORDER — RISPERIDONE 4 MG/1
1.5 TABLET ORAL AT BEDTIME
Qty: 0 | Refills: 0 | Status: DISCONTINUED | OUTPATIENT
Start: 2018-11-27 | End: 2018-11-30

## 2018-11-27 RX ORDER — NICOTINE POLACRILEX 2 MG
2 GUM BUCCAL
Qty: 0 | Refills: 0 | Status: DISCONTINUED | OUTPATIENT
Start: 2018-11-27 | End: 2018-11-27

## 2018-11-27 RX ORDER — NICOTINE POLACRILEX 2 MG
1 GUM BUCCAL DAILY
Qty: 0 | Refills: 0 | Status: DISCONTINUED | OUTPATIENT
Start: 2018-11-27 | End: 2018-11-30

## 2018-11-27 RX ADMIN — Medication 1 TABLET(S): at 09:23

## 2018-11-27 RX ADMIN — Medication 325 MILLIGRAM(S): at 07:08

## 2018-11-27 RX ADMIN — Medication 100 MILLIGRAM(S): at 09:23

## 2018-11-27 RX ADMIN — RISPERIDONE 1.5 MILLIGRAM(S): 4 TABLET ORAL at 21:53

## 2018-11-27 RX ADMIN — Medication 1 MILLIGRAM(S): at 09:23

## 2018-11-27 RX ADMIN — Medication 50 MILLIGRAM(S): at 21:53

## 2018-11-27 RX ADMIN — RISPERIDONE 2 MILLIGRAM(S): 4 TABLET ORAL at 09:23

## 2018-11-27 RX ADMIN — Medication 325 MILLIGRAM(S): at 14:14

## 2018-11-27 RX ADMIN — Medication 325 MILLIGRAM(S): at 21:53

## 2018-11-27 NOTE — PROGRESS NOTE BEHAVIORAL HEALTH - SUMMARY
At time of admission: 53 YO M c polysubstance (cocaine/heroin/etoh/cannabis/benzo) use d/o, susbtance induced mood d/o & psychosis vs. bipolar or schizoaffective who regularly comes to [facility] with SI in context of substance use. Never adherent to outpt treatment or meds. Secondary gain, malingering, and antisocial traits suspected. 1)Pt can be admitted to 8[facility] on voluntary status if medically cleared. In light of pruritus and report of recent bug bites, bug infestation should be treated, and medical clearance needed in light of anemia and abnormal CBC results.     ;;11/14: recieived Nix shampoo for pruritis ; concern about lice; [place].  Medicine is reviewing anemia with falling Hgb. (11.0 in Janunary and now 7.2)   no NVD; patient states he hears voices and has SI but acknowledges recent use of cocaine and depression that follows (Urine tox positive for cocaine and THC);   QTc 426ms;  CT head last January:  ;There is slight diffuse parenchymal loss. There is no hydrocephalus. ;There is cavum septum pellucidum et vergae, a normal variant. There is no ;acute intracranial hemorrhage. There is no mass effect, midline shift or ;extra axial collection ; may need to repeat .  Last January received Invega SUSTENNA 156mg IM and Neurontin 300mg po TID with good effect.   Because Invega is not forumulary may need to substitute Risperdal for now.  Start at low dose mid-day and titrate upwards for AH and mood.     ;;11/15: no longer scratching but almost mute; avoidant; has AH;  discussed restarting Risperdal for AH; patient who had initially declined states he will consent; w/u of anemia in progress.   ;;11/20: receiving iron supplements for anemia;  a little more visible; affect just slightly less constricted;  shaved head; AH persist but no SI.   ;;11/21:  some suggestion of improved mood and less isolative; raising Risperdal to 1.5mg in am and 1mg in pm for AH and psychotic withdrawal.  Denies SI.   ;;11/23: refused  Ferrous Sulfate but appears to have resumed compliance. ;  smiles when writer discussing this.  isolative however better eye contact; AH still present.   Raising Risperdal to 2mg in am and 1mg at night for AH and psychotic sxs.    ;;11/26: not as preoccupied; AH are gone; SI not present;  somewhat better related;  allowing CAMS interview.   ;;11/27: still avoidant and blocked ;  raising Risperdal to 1.5mg at night and 1mg in am for psychotic sxs;  NRT (patch 21mg daily ). At time of admission: 51 YO M c polysubstance (cocaine/heroin/etoh/cannabis/benzo) use d/o, susbtance induced mood d/o & psychosis vs. bipolar or schizoaffective who regularly comes to [facility] with SI in context of substance use. Never adherent to outpt treatment or meds. Secondary gain, malingering, and antisocial traits suspected. 1)Pt can be admitted to 8[facility] on voluntary status if medically cleared. In light of pruritus and report of recent bug bites, bug infestation should be treated, and medical clearance needed in light of anemia and abnormal CBC results.     ;;11/14: recieived Nix shampoo for pruritis ; concern about lice; [place]  Medicine is reviewing anemia with falling Hgb. (11.0 in January and now 7.2)   no NVD; patient states he hears voices and has SI but acknowledges recent use of cocaine and depression that follows (Urine tox positive for cocaine and THC);   QTc 426ms;  CT head last January:  ;There is slight diffuse parenchymal loss. There is no hydrocephalus. ;There is cavum septum pellucidum et vergae, a normal variant. There is no ;acute intracranial hemorrhage. There is no mass effect, midline shift or ;extra axial collection ; may need to repeat .  Last January received Invega SUSTENNA 156mg IM and Neurontin 300mg po TID with good effect.   Because Invega is not formulary may need to substitute Risperdal for now.  Start at low dose mid-day and titrate upwards for AH and mood.     ;;11/15: no longer scratching but almost mute; avoidant; has AH;  discussed restarting Risperdal for AH; patient who had initially declined states he will consent; w/u of anemia in progress.   ;;11/20: receiving iron supplements for anemia;  a little more visible; affect just slightly less constricted;  shaved head; AH persist but no SI.   ;;11/21:  some suggestion of improved mood and less isolative; raising Risperdal to 1.5mg in am and 1mg in pm for AH and psychotic withdrawal.  Denies SI.   ;;11/23: refused  Ferrous Sulfate but appears to have resumed compliance. ;  smiles when writer discussing this.  isolative however better eye contact; AH still present.   Raising Risperdal to 2mg in am and 1mg at night for AH and psychotic sxs.    ;;11/26: not as preoccupied; AH are gone; SI not present;  somewhat better related;  allowing CAMS interview.   ;;11/27: still avoidant and blocked ;  raising Risperdal to 1.5mg at night and 1mg in am for psychotic sxs;  NRT (patch 21mg daily ).

## 2018-11-27 NOTE — PROGRESS NOTE BEHAVIORAL HEALTH - NSBHFUPINTERVALCCFT_PSY_A_CORE
Wants nicotine patch; discussed increasing dose of Risperdal; no sleep appettite or pain issues; discussed transition to aftercare likely 11/29 after completing dose adjustment.

## 2018-11-27 NOTE — PROGRESS NOTE BEHAVIORAL HEALTH - NSBHFUPINTERVALHXFT_PSY_A_CORE
MSE: better  eye contact; . smiles at times; however no spontaneous speech but shorter latencies when ansering quesetions.  less blocked and less  disheveled; alert, oriented, cognition intact.  ; voices are gone.  no SI. .  appears  less  blocked and not as  intermally preoccupied.  mood anxious  affect anxious  and constricted; still rather guarded and evasive.  i&j: poor.  accepting  treatment; however not reflective on sxs or situation.

## 2018-11-28 PROCEDURE — 99232 SBSQ HOSP IP/OBS MODERATE 35: CPT

## 2018-11-28 RX ADMIN — Medication 100 MILLIGRAM(S): at 10:01

## 2018-11-28 RX ADMIN — RISPERIDONE 2 MILLIGRAM(S): 4 TABLET ORAL at 10:01

## 2018-11-28 RX ADMIN — Medication 50 MILLIGRAM(S): at 21:27

## 2018-11-28 RX ADMIN — Medication 1 MILLIGRAM(S): at 10:01

## 2018-11-28 RX ADMIN — Medication 325 MILLIGRAM(S): at 21:27

## 2018-11-28 RX ADMIN — Medication 325 MILLIGRAM(S): at 06:53

## 2018-11-28 RX ADMIN — Medication 1 PATCH: at 10:01

## 2018-11-28 RX ADMIN — RISPERIDONE 1.5 MILLIGRAM(S): 4 TABLET ORAL at 21:27

## 2018-11-28 RX ADMIN — Medication 1 PATCH: at 18:09

## 2018-11-28 RX ADMIN — Medication 1 TABLET(S): at 10:01

## 2018-11-28 RX ADMIN — Medication 325 MILLIGRAM(S): at 13:56

## 2018-11-28 NOTE — PROGRESS NOTE BEHAVIORAL HEALTH - NSBHFUPINTERVALHXFT_PSY_A_CORE
sits in back of TV room next to another patient but little interaction.       MSE: fair   eye contact; . smiles at times; however no spontaneous speech but shorter latencies when ansering quesetions.  less blocked and less  disheveled; alert, oriented, cognition intact.  ; voices are gone.  no SI. .  appears  less  blocked and not as  intermally preoccupied.  mood anxious  affect anxious  and constricted; still rather guarded and evasive.  i&j: poor.  accepting  treatment; however not reflective on sxs or situation.

## 2018-11-28 NOTE — PROGRESS NOTE BEHAVIORAL HEALTH - NSBHFUPINTERVALCCFT_PSY_A_CORE
no sleep appetite or pain issues.  Not spontaneous.  Not conversational.   Discussed plan to monitor  cbc and counselled to continue Iron suplementation with food in stomach after d/c.

## 2018-11-28 NOTE — PROGRESS NOTE BEHAVIORAL HEALTH - SUMMARY
At time of admission: 53 YO M c polysubstance (cocaine/heroin/etoh/cannabis/benzo) use d/o, susbtance induced mood d/o & psychosis vs. bipolar or schizoaffective who regularly comes to [facility] with SI in context of substance use. Never adherent to outpt treatment or meds. Secondary gain, malingering, and antisocial traits suspected. 1)Pt can be admitted to 8[facility] on voluntary status if medically cleared. In light of pruritus and report of recent bug bites, bug infestation should be treated, and medical clearance needed in light of anemia and abnormal CBC results.     ;;11/14: recieived Nix shampoo for pruritis ; concern about lice; [place].  Medicine is reviewing anemia with falling Hgb. (11.0 in Janunary and now 7.2)   no NVD; patient states he hears voices and has SI but acknowledges recent use of cocaine and depression that follows (Urine tox positive for cocaine and THC);   QTc 426ms;  CT head last January:  ;There is slight diffuse parenchymal loss. There is no hydrocephalus. ;There is cavum septum pellucidum et vergae, a normal variant. There is no ;acute intracranial hemorrhage. There is no mass effect, midline shift or ;extra axial collection ; may need to repeat .  Last January received Invega SUSTENNA 156mg IM and Neurontin 300mg po TID with good effect.   Because Invega is not forumulary may need to substitute Risperdal for now.  Start at low dose mid-day and titrate upwards for AH and mood.     ;;11/15: no longer scratching but almost mute; avoidant; has AH;  discussed restarting Risperdal for AH; patient who had initially declined states he will consent; w/u of anemia in progress.   ;;11/20: receiving iron supplements for anemia;  a little more visible; affect just slightly less constricted;  shaved head; AH persist but no SI.   ;;11/21:  some suggestion of improved mood and less isolative; raising Risperdal to 1.5mg in am and 1mg in pm for AH and psychotic withdrawal.  Denies SI.   ;;11/23: refused  Ferrous Sulfate but appears to have resumed compliance. ;  smiles when writer discussing this.  isolative however better eye contact; AH still present.   Raising Risperdal to 2mg in am and 1mg at night for AH and psychotic sxs.    ;;11/26: not as preoccupied; AH are gone; SI not present;  somewhat better related;  allowing CAMS interview.   ;;11/27: still avoidant and blocked ;  raising Risperdal to 1.5mg at night and 1mg in am for psychotic sxs;  NRT (patch 21mg daily ).   ;;11/28: continues more visible but blocked ;

## 2018-11-29 VITALS
HEART RATE: 83 BPM | RESPIRATION RATE: 18 BRPM | TEMPERATURE: 99 F | SYSTOLIC BLOOD PRESSURE: 119 MMHG | DIASTOLIC BLOOD PRESSURE: 72 MMHG

## 2018-11-29 PROCEDURE — 99232 SBSQ HOSP IP/OBS MODERATE 35: CPT

## 2018-11-29 RX ADMIN — Medication 1 PATCH: at 07:11

## 2018-11-29 RX ADMIN — Medication 1 MILLIGRAM(S): at 09:52

## 2018-11-29 RX ADMIN — Medication 100 MILLIGRAM(S): at 09:52

## 2018-11-29 RX ADMIN — Medication 1 PATCH: at 18:45

## 2018-11-29 RX ADMIN — Medication 325 MILLIGRAM(S): at 21:12

## 2018-11-29 RX ADMIN — Medication 1 PATCH: at 09:52

## 2018-11-29 RX ADMIN — RISPERIDONE 2 MILLIGRAM(S): 4 TABLET ORAL at 09:52

## 2018-11-29 RX ADMIN — Medication 1 TABLET(S): at 09:52

## 2018-11-29 RX ADMIN — Medication 1 PATCH: at 09:53

## 2018-11-29 RX ADMIN — Medication 50 MILLIGRAM(S): at 21:11

## 2018-11-29 RX ADMIN — RISPERIDONE 1.5 MILLIGRAM(S): 4 TABLET ORAL at 21:12

## 2018-11-29 RX ADMIN — Medication 325 MILLIGRAM(S): at 09:52

## 2018-11-29 RX ADMIN — Medication 325 MILLIGRAM(S): at 17:59

## 2018-11-29 NOTE — PROGRESS NOTE BEHAVIORAL HEALTH - SUMMARY
At time of admission: 51 YO M c polysubstance (cocaine/heroin/etoh/cannabis/benzo) use d/o, susbtance induced mood d/o & psychosis vs. bipolar or schizoaffective who regularly comes to [facility] with SI in context of substance use. Never adherent to outpt treatment or meds. Secondary gain, malingering, and antisocial traits suspected. 1)Pt can be admitted to 8[facility] on voluntary status if medically cleared. In light of pruritus and report of recent bug bites, bug infestation should be treated, and medical clearance needed in light of anemia and abnormal CBC results.     ;;11/14: recieived Nix shampoo for pruritis ; concern about lice; [place].  Medicine is reviewing anemia with falling Hgb. (11.0 in Janunary and now 7.2)   no NVD; patient states he hears voices and has SI but acknowledges recent use of cocaine and depression that follows (Urine tox positive for cocaine and THC);   QTc 426ms;  CT head last January:  ;There is slight diffuse parenchymal loss. There is no hydrocephalus. ;There is cavum septum pellucidum et vergae, a normal variant. There is no ;acute intracranial hemorrhage. There is no mass effect, midline shift or ;extra axial collection ; may need to repeat .  Last January received Invega SUSTENNA 156mg IM and Neurontin 300mg po TID with good effect.   Because Invega is not forumulary may need to substitute Risperdal for now.  Start at low dose mid-day and titrate upwards for AH and mood.     ;;11/15: no longer scratching but almost mute; avoidant; has AH;  discussed restarting Risperdal for AH; patient who had initially declined states he will consent; w/u of anemia in progress.   ;;11/20: receiving iron supplements for anemia;  a little more visible; affect just slightly less constricted;  shaved head; AH persist but no SI.   ;;11/21:  some suggestion of improved mood and less isolative; raising Risperdal to 1.5mg in am and 1mg in pm for AH and psychotic withdrawal.  Denies SI.   ;;11/23: refused  Ferrous Sulfate but appears to have resumed compliance. ;  smiles when writer discussing this.  isolative however better eye contact; AH still present.   Raising Risperdal to 2mg in am and 1mg at night for AH and psychotic sxs.    ;;11/26: not as preoccupied; AH are gone; SI not present;  somewhat better related;  allowing CAMS interview.   ;;11/27: still avoidant and blocked ;  raising Risperdal to 1.5mg at night and 1mg in am for psychotic sxs;  NRT (patch 21mg daily ).   ;;11/29: continues more visible but blocked ;   last admission FTA titers low; had been treated in past for syphillis.

## 2018-11-29 NOTE — PROGRESS NOTE BEHAVIORAL HEALTH - NSBHFUPINTERVALHXFT_PSY_A_CORE
up early and getting breakfast tray; smiles and makes much better eye contqact.     MSE: fair   eye contact; . smiles at times; however no spontaneous speech but shorter latencies when ansering quesetions.  less blocked and less  disheveled; alert, oriented, cognition intact.  ; voices are gone.  no SI. .  appears  less  blocked and not as  intermally preoccupied.  mood anxious  affect anxious  and constricted; still rather guarded and evasive.  i&j: poor.  accepting  treatment; however not reflective on sxs or situation.

## 2018-11-30 PROCEDURE — 99238 HOSP IP/OBS DSCHRG MGMT 30/<: CPT

## 2018-11-30 RX ORDER — FOLIC ACID 0.8 MG
1 TABLET ORAL
Qty: 15 | Refills: 0 | OUTPATIENT
Start: 2018-11-30 | End: 2018-12-14

## 2018-11-30 RX ORDER — TRAZODONE HCL 50 MG
1 TABLET ORAL
Qty: 7 | Refills: 0 | OUTPATIENT
Start: 2018-11-30 | End: 2018-12-06

## 2018-11-30 RX ORDER — RISPERIDONE 4 MG/1
3 TABLET ORAL
Qty: 45 | Refills: 0 | OUTPATIENT
Start: 2018-11-30 | End: 2018-12-14

## 2018-11-30 RX ORDER — DOCUSATE SODIUM 100 MG
1 CAPSULE ORAL
Qty: 15 | Refills: 0 | OUTPATIENT
Start: 2018-11-30 | End: 2018-12-14

## 2018-11-30 RX ORDER — THIAMINE MONONITRATE (VIT B1) 100 MG
1 TABLET ORAL
Qty: 15 | Refills: 0 | OUTPATIENT
Start: 2018-11-30 | End: 2018-12-14

## 2018-11-30 RX ORDER — FERROUS SULFATE 325(65) MG
1 TABLET ORAL
Qty: 15 | Refills: 0 | OUTPATIENT
Start: 2018-11-30 | End: 2018-12-14

## 2018-11-30 RX ORDER — RISPERIDONE 4 MG/1
1 TABLET ORAL
Qty: 15 | Refills: 0 | OUTPATIENT
Start: 2018-11-30 | End: 2018-12-14

## 2018-11-30 RX ADMIN — Medication 100 MILLIGRAM(S): at 10:15

## 2018-11-30 RX ADMIN — RISPERIDONE 2 MILLIGRAM(S): 4 TABLET ORAL at 10:14

## 2018-11-30 RX ADMIN — Medication 1 PATCH: at 10:17

## 2018-11-30 RX ADMIN — Medication 1 MILLIGRAM(S): at 10:15

## 2018-11-30 RX ADMIN — Medication 1 TABLET(S): at 10:15

## 2018-11-30 RX ADMIN — Medication 325 MILLIGRAM(S): at 07:11

## 2018-11-30 NOTE — PROGRESS NOTE BEHAVIORAL HEALTH - PROBLEM SELECTOR PROBLEM 1
Schizophrenia, unspecified type

## 2018-11-30 NOTE — PROGRESS NOTE BEHAVIORAL HEALTH - NSBHFUPINTERVALHXFT_PSY_A_CORE
more active on unit; more social with some other patients.     MSE: fair   eye contact; . smiles at times; now very focused on leaving , getting his clothes, etc. .  less blocked and less  disheveled; alert, oriented, cognition intact.  ; voices are gone.  no SI. .  appears  less  blocked and not as  intermally preoccupied.  mood anxious  affect anxious  and constricted; still rather guarded and evasive.  i&j: poor.  accepting  treatment; however not reflective on sxs or situation.

## 2018-11-30 NOTE — PROGRESS NOTE BEHAVIORAL HEALTH - NSBHADMITIPOBSFT_PSY_A_CORE
makes needs known

## 2018-11-30 NOTE — PROGRESS NOTE BEHAVIORAL HEALTH - AFFECT CONGRUENCE
Not congruent

## 2018-11-30 NOTE — PROGRESS NOTE BEHAVIORAL HEALTH - NSBHADMITIPBHPROVFT_PSY_A_CORE
[facility] staff

## 2018-11-30 NOTE — PROGRESS NOTE BEHAVIORAL HEALTH - AFFECT QUALITY
Other/Euthymic
Euthymic/Other
Euthymic/Other
Other/Euthymic
Euthymic/Other
Euthymic/Other
Other/Euthymic
Other/Euthymic

## 2018-11-30 NOTE — PROGRESS NOTE BEHAVIORAL HEALTH - PROBLEM SELECTOR PLAN 1
Risperdal /SUSTENNA;  see interval and summary data for updates
Risperdal/ CONSTA;  see interval and summary data for updates
Risperdal /SUSTENNA;  see interval and summary data for updates

## 2018-11-30 NOTE — PROGRESS NOTE BEHAVIORAL HEALTH - NS ED BHA MSE SPEECH ARTICULATION
Other/Normal
Normal/Other
Other/Normal
Other/Normal
Normal/Other
Normal/Other
Other/Normal
Other/Normal

## 2018-11-30 NOTE — PROGRESS NOTE BEHAVIORAL HEALTH - NSBHFUPINTERVALCCFT_PSY_A_CORE
no sleep appetite or pain issues;  approaches writer frequently this am asking when he is leaving (today).
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
## 2018-11-30 NOTE — PROGRESS NOTE BEHAVIORAL HEALTH - PROBLEM SELECTOR PLAN 2
med consult; supplementation after understanding etiology
med consult; receiving Fe SO4
med consult; supplementation after understanding etiology
med consult; receiving Fe SO4
med consult; supplementation after understanding etiology
med consult; receiving Fe SO4
med consult; supplementation after understanding etiology
med consult; receiving Fe SO4

## 2018-11-30 NOTE — PROGRESS NOTE BEHAVIORAL HEALTH - NSBHCHARTREVIEWVS_PSY_A_CORE FT
Vital Signs Last 24 Hrs  T(C): 37.1 (15 Nov 2018 09:12), Max: 37.1 (15 Nov 2018 09:12)  T(F): 98.8 (15 Nov 2018 09:12), Max: 98.8 (15 Nov 2018 09:12)  HR: 98 (15 Nov 2018 09:12) (86 - 103)  BP: 111/72 (15 Nov 2018 09:12) (111/72 - 118/79)  BP(mean): --  RR: 20 (15 Nov 2018 09:12) (18 - 20)  SpO2: --
Vital Signs Last 24 Hrs  T(C): 36.8 (28 Nov 2018 08:59), Max: 36.8 (28 Nov 2018 08:59)  T(F): 98.2 (28 Nov 2018 08:59), Max: 98.2 (28 Nov 2018 08:59)  HR: 102 (28 Nov 2018 08:59) (102 - 102)  BP: 120/76 (28 Nov 2018 08:59) (120/76 - 120/76)  BP(mean): --  RR: --  SpO2: --
Vital Signs Last 24 Hrs  T(C): 36.9 (26 Nov 2018 09:37), Max: 36.9 (25 Nov 2018 16:20)  T(F): 98.5 (26 Nov 2018 09:37), Max: 98.5 (26 Nov 2018 09:37)  HR: 74 (26 Nov 2018 09:37) (74 - 93)  BP: 112/72 (26 Nov 2018 09:37) (112/72 - 126/88)  BP(mean): --  RR: 18 (26 Nov 2018 09:37) (18 - 18)  SpO2: --
Vital Signs Last 24 Hrs  T(C): 36.8 (20 Nov 2018 06:25), Max: 37.1 (19 Nov 2018 20:21)  T(F): 98.2 (20 Nov 2018 06:25), Max: 98.8 (19 Nov 2018 20:21)  HR: 96 (20 Nov 2018 09:19) (96 - 103)  BP: 113/70 (20 Nov 2018 09:19) (109/70 - 116/73)  BP(mean): --  RR: 17 (20 Nov 2018 06:25) (17 - 18)  SpO2: --
Vital Signs Last 24 Hrs  T(C): 36.8 (28 Nov 2018 08:59), Max: 36.8 (28 Nov 2018 08:59)  T(F): 98.2 (28 Nov 2018 08:59), Max: 98.2 (28 Nov 2018 08:59)  HR: 102 (28 Nov 2018 08:59) (102 - 102)  BP: 120/76 (28 Nov 2018 08:59) (120/76 - 120/76)  BP(mean): --  RR: --  SpO2: --
Vital Signs Last 24 Hrs  T(C): 37.1 (15 Nov 2018 09:12), Max: 37.1 (15 Nov 2018 09:12)  T(F): 98.8 (15 Nov 2018 09:12), Max: 98.8 (15 Nov 2018 09:12)  HR: 98 (15 Nov 2018 09:12) (86 - 103)  BP: 111/72 (15 Nov 2018 09:12) (111/72 - 118/79)  BP(mean): --  RR: 20 (15 Nov 2018 09:12) (18 - 20)  SpO2: --
Vital Signs Last 24 Hrs  T(C): 37.2 (20 Nov 2018 15:58), Max: 37.2 (20 Nov 2018 15:58)  T(F): 99 (20 Nov 2018 15:58), Max: 99 (20 Nov 2018 15:58)  HR: 101 (20 Nov 2018 15:58) (96 - 101)  BP: 103/62 (20 Nov 2018 15:58) (103/62 - 113/70)  BP(mean): --  RR: 18 (20 Nov 2018 15:58) (18 - 18)  SpO2: --
Vital Signs Last 24 Hrs  T(C): 36.8 (28 Nov 2018 08:59), Max: 36.8 (28 Nov 2018 08:59)  T(F): 98.2 (28 Nov 2018 08:59), Max: 98.2 (28 Nov 2018 08:59)  HR: 102 (28 Nov 2018 08:59) (102 - 102)  BP: 120/76 (28 Nov 2018 08:59) (120/76 - 120/76)  BP(mean): --  RR: --  SpO2: --
Vital Signs Last 24 Hrs  T(C): 36.9 (26 Nov 2018 09:37), Max: 36.9 (25 Nov 2018 16:20)  T(F): 98.5 (26 Nov 2018 09:37), Max: 98.5 (26 Nov 2018 09:37)  HR: 74 (26 Nov 2018 09:37) (74 - 93)  BP: 112/72 (26 Nov 2018 09:37) (112/72 - 126/88)  BP(mean): --  RR: 18 (26 Nov 2018 09:37) (18 - 18)  SpO2: --
Vital Signs Last 24 Hrs  T(C): 37.1 (15 Nov 2018 09:12), Max: 37.1 (15 Nov 2018 09:12)  T(F): 98.8 (15 Nov 2018 09:12), Max: 98.8 (15 Nov 2018 09:12)  HR: 98 (15 Nov 2018 09:12) (86 - 103)  BP: 111/72 (15 Nov 2018 09:12) (111/72 - 118/79)  BP(mean): --  RR: 20 (15 Nov 2018 09:12) (18 - 20)  SpO2: --
Vital Signs Last 24 Hrs  T(C): 37.2 (20 Nov 2018 15:58), Max: 37.2 (20 Nov 2018 15:58)  T(F): 99 (20 Nov 2018 15:58), Max: 99 (20 Nov 2018 15:58)  HR: 101 (20 Nov 2018 15:58) (96 - 101)  BP: 103/62 (20 Nov 2018 15:58) (103/62 - 113/70)  BP(mean): --  RR: 18 (20 Nov 2018 15:58) (18 - 18)  SpO2: --

## 2018-11-30 NOTE — PROGRESS NOTE BEHAVIORAL HEALTH - THOUGHT PROCESS
Normal reasoning/Linear
Normal reasoning/Linear
Linear/Normal reasoning
Normal reasoning/Linear
Linear/Normal reasoning
Linear/Normal reasoning
Normal reasoning/Linear
Normal reasoning/Linear

## 2018-11-30 NOTE — PROGRESS NOTE BEHAVIORAL HEALTH - PROBLEM SELECTOR PROBLEM 2
Iron deficiency anemia, unspecified iron deficiency anemia type
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
## 2018-11-30 NOTE — PROGRESS NOTE BEHAVIORAL HEALTH - MOOD
Depressed

## 2018-11-30 NOTE — PROGRESS NOTE BEHAVIORAL HEALTH - THOUGHT CONTENT
Suicidality

## 2018-11-30 NOTE — PROGRESS NOTE BEHAVIORAL HEALTH - PROBLEM SELECTOR PROBLEM 3
Cocaine use with cocaine-induced mood disorder

## 2018-11-30 NOTE — PROGRESS NOTE BEHAVIORAL HEALTH - PROBLEM SELECTOR PLAN 3
counselling; rehab?

## 2018-12-02 DIAGNOSIS — F19.14 OTHER PSYCHOACTIVE SUBSTANCE ABUSE WITH PSYCHOACTIVE SUBSTANCE-INDUCED MOOD DISORDER: ICD-10-CM

## 2018-12-02 DIAGNOSIS — F20.9 SCHIZOPHRENIA, UNSPECIFIED: ICD-10-CM

## 2018-12-02 DIAGNOSIS — D50.9 IRON DEFICIENCY ANEMIA, UNSPECIFIED: ICD-10-CM

## 2018-12-02 DIAGNOSIS — F17.200 NICOTINE DEPENDENCE, UNSPECIFIED, UNCOMPLICATED: ICD-10-CM

## 2018-12-02 DIAGNOSIS — L85.3 XEROSIS CUTIS: ICD-10-CM

## 2018-12-02 DIAGNOSIS — R45.851 SUICIDAL IDEATIONS: ICD-10-CM

## 2018-12-06 ENCOUNTER — HOSPITAL ENCOUNTER (INPATIENT)
Dept: HOSPITAL 74 - YASAS | Age: 53
LOS: 13 days | Discharge: HOME | DRG: 772 | End: 2018-12-19
Attending: PSYCHIATRY & NEUROLOGY | Admitting: PSYCHIATRY & NEUROLOGY
Payer: COMMERCIAL

## 2018-12-06 VITALS — BODY MASS INDEX: 23.6 KG/M2

## 2018-12-06 DIAGNOSIS — F17.210: ICD-10-CM

## 2018-12-06 DIAGNOSIS — F14.20: ICD-10-CM

## 2018-12-06 DIAGNOSIS — F20.9: ICD-10-CM

## 2018-12-06 DIAGNOSIS — F31.9: ICD-10-CM

## 2018-12-06 DIAGNOSIS — G81.94: ICD-10-CM

## 2018-12-06 DIAGNOSIS — F10.20: Primary | ICD-10-CM

## 2018-12-06 DIAGNOSIS — F19.24: ICD-10-CM

## 2018-12-06 LAB
ALBUMIN SERPL-MCNC: 3.3 G/DL (ref 3.4–5)
ALP SERPL-CCNC: 117 U/L (ref 45–117)
ALT SERPL-CCNC: 13 U/L (ref 13–61)
ANION GAP SERPL CALC-SCNC: 9 MMOL/L (ref 8–16)
APPEARANCE UR: (no result)
AST SERPL-CCNC: 11 U/L (ref 15–37)
BILIRUB SERPL-MCNC: 0.2 MG/DL (ref 0.2–1)
BILIRUB UR STRIP.AUTO-MCNC: NEGATIVE MG/DL
BUN SERPL-MCNC: 16 MG/DL (ref 7–18)
CALCIUM SERPL-MCNC: 8.8 MG/DL (ref 8.5–10.1)
CHLORIDE SERPL-SCNC: 106 MMOL/L (ref 98–107)
CO2 SERPL-SCNC: 27 MMOL/L (ref 21–32)
COLOR UR: YELLOW
CREAT SERPL-MCNC: 1 MG/DL (ref 0.55–1.3)
DEPRECATED RDW RBC AUTO: 21.3 % (ref 11.9–15.9)
GLUCOSE SERPL-MCNC: 88 MG/DL (ref 74–106)
HCT VFR BLD CALC: 25.7 % (ref 35.4–49)
HGB BLD-MCNC: 8.2 GM/DL (ref 11.7–16.9)
KETONES UR QL STRIP: NEGATIVE
LEUKOCYTE ESTERASE UR QL STRIP.AUTO: NEGATIVE
MCH RBC QN AUTO: 20.4 PG (ref 25.7–33.7)
MCHC RBC AUTO-ENTMCNC: 31.9 G/DL (ref 32–35.9)
MCV RBC: 63.8 FL (ref 80–96)
NITRITE UR QL STRIP: NEGATIVE
PH UR: 8 [PH] (ref 5–8)
PLATELET # BLD AUTO: 549 K/MM3 (ref 134–434)
PMV BLD: 7.7 FL (ref 7.5–11.1)
POTASSIUM SERPLBLD-SCNC: 4.3 MMOL/L (ref 3.5–5.1)
PROT SERPL-MCNC: 7 G/DL (ref 6.4–8.2)
PROT UR QL STRIP: NEGATIVE
PROT UR QL STRIP: NEGATIVE
RBC # BLD AUTO: 4.03 M/MM3 (ref 4–5.6)
SODIUM SERPL-SCNC: 142 MMOL/L (ref 136–145)
SP GR UR: 1.01 (ref 1.01–1.03)
UROBILINOGEN UR STRIP-MCNC: NEGATIVE MG/DL (ref 0.2–1)
WBC # BLD AUTO: 6.8 K/MM3 (ref 4–10)

## 2018-12-06 PROCEDURE — HZ42ZZZ GROUP COUNSELING FOR SUBSTANCE ABUSE TREATMENT, COGNITIVE-BEHAVIORAL: ICD-10-PCS | Performed by: PSYCHIATRY & NEUROLOGY

## 2018-12-06 RX ADMIN — Medication SCH MG: at 21:07

## 2018-12-06 RX ADMIN — NICOTINE SCH MG: 21 PATCH TRANSDERMAL at 15:50

## 2018-12-06 RX ADMIN — BACITRACIN ZINC SCH: 500 OINTMENT TOPICAL at 19:08
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
## 2018-12-07 RX ADMIN — Medication SCH MG: at 21:07

## 2018-12-07 RX ADMIN — Medication SCH TAB: at 10:04

## 2018-12-07 RX ADMIN — NICOTINE SCH MG: 21 PATCH TRANSDERMAL at 10:05

## 2018-12-07 RX ADMIN — BACITRACIN ZINC SCH GM: 500 OINTMENT TOPICAL at 10:05

## 2018-12-07 NOTE — EKG
Test Reason : 

Blood Pressure : ***/*** mmHG

Vent. Rate : 092 BPM     Atrial Rate : 092 BPM

   P-R Int : 120 ms          QRS Dur : 086 ms

    QT Int : 342 ms       P-R-T Axes : 066 078 061 degrees

   QTc Int : 422 ms

 

NORMAL SINUS RHYTHM

NORMAL ECG

NO PREVIOUS ECGS AVAILABLE

Confirmed by OSEI FAJARDO, TATO ([id number]) on 12/7/2018 11:38:36 AM

 

Referred By:             Confirmed By:TATO FRANZ MD

## 2018-12-07 NOTE — PN
[facility] Progress Note


Note: 





Psychiatric nurse practitioner note:


Will maintain patient on current medication regime of zyprexa 5mg and abilify 

10mg. Patient is currently stable. No psychosis noted. Patient insist that this 

combination has worked well for him.

## 2018-12-08 RX ADMIN — NICOTINE SCH: 21 PATCH TRANSDERMAL at 11:03

## 2018-12-08 RX ADMIN — BACITRACIN ZINC SCH: 500 OINTMENT TOPICAL at 11:03

## 2018-12-08 RX ADMIN — Medication SCH: at 11:03

## 2018-12-08 RX ADMIN — Medication SCH MG: at 21:21

## 2018-12-09 RX ADMIN — NICOTINE SCH: 21 PATCH TRANSDERMAL at 09:52

## 2018-12-09 RX ADMIN — BACITRACIN ZINC SCH: 500 OINTMENT TOPICAL at 09:52

## 2018-12-09 RX ADMIN — Medication SCH: at 09:53

## 2018-12-09 RX ADMIN — Medication SCH MG: at 21:12

## 2018-12-10 RX ADMIN — Medication SCH: at 10:01

## 2018-12-10 RX ADMIN — BACITRACIN ZINC SCH: 500 OINTMENT TOPICAL at 10:01

## 2018-12-10 RX ADMIN — Medication SCH MG: at 21:04

## 2018-12-10 RX ADMIN — NICOTINE SCH: 21 PATCH TRANSDERMAL at 10:01

## 2018-12-11 RX ADMIN — Medication PRN MG: at 21:16

## 2018-12-11 RX ADMIN — Medication SCH MG: at 21:15

## 2018-12-11 RX ADMIN — Medication SCH TAB: at 10:04

## 2018-12-11 RX ADMIN — BACITRACIN ZINC SCH GM: 500 OINTMENT TOPICAL at 10:04

## 2018-12-11 RX ADMIN — NICOTINE SCH MG: 21 PATCH TRANSDERMAL at 10:04

## 2018-12-12 RX ADMIN — Medication SCH MG: at 21:12

## 2018-12-12 RX ADMIN — BACITRACIN ZINC SCH GM: 500 OINTMENT TOPICAL at 10:05

## 2018-12-12 RX ADMIN — Medication PRN MG: at 21:13

## 2018-12-12 RX ADMIN — Medication SCH TAB: at 10:05

## 2018-12-12 RX ADMIN — NICOTINE SCH MG: 21 PATCH TRANSDERMAL at 10:05

## 2018-12-13 RX ADMIN — Medication SCH TAB: at 10:22

## 2018-12-13 RX ADMIN — NICOTINE SCH MG: 21 PATCH TRANSDERMAL at 10:23

## 2018-12-13 RX ADMIN — Medication PRN MG: at 21:14

## 2018-12-13 RX ADMIN — Medication SCH MG: at 21:14

## 2018-12-13 RX ADMIN — BACITRACIN ZINC SCH GM: 500 OINTMENT TOPICAL at 10:22

## 2018-12-14 RX ADMIN — Medication SCH TAB: at 10:13

## 2018-12-14 RX ADMIN — NICOTINE SCH MG: 21 PATCH TRANSDERMAL at 10:13

## 2018-12-14 RX ADMIN — Medication SCH MG: at 21:06

## 2018-12-14 RX ADMIN — Medication PRN MG: at 21:06

## 2018-12-14 RX ADMIN — BACITRACIN ZINC SCH GM: 500 OINTMENT TOPICAL at 10:12

## 2018-12-15 RX ADMIN — Medication SCH MG: at 21:20

## 2018-12-15 RX ADMIN — Medication SCH TAB: at 09:39

## 2018-12-15 RX ADMIN — Medication PRN MG: at 21:21

## 2018-12-15 RX ADMIN — NICOTINE SCH MG: 21 PATCH TRANSDERMAL at 09:39

## 2018-12-15 RX ADMIN — BACITRACIN ZINC SCH GM: 500 OINTMENT TOPICAL at 09:39

## 2018-12-16 RX ADMIN — Medication PRN MG: at 21:19

## 2018-12-16 RX ADMIN — BACITRACIN ZINC SCH GM: 500 OINTMENT TOPICAL at 09:55

## 2018-12-16 RX ADMIN — NICOTINE SCH MG: 21 PATCH TRANSDERMAL at 09:55

## 2018-12-16 RX ADMIN — Medication SCH TAB: at 09:55

## 2018-12-16 RX ADMIN — Medication SCH MG: at 21:18

## 2018-12-17 RX ADMIN — Medication SCH MG: at 21:18

## 2018-12-17 RX ADMIN — Medication PRN MG: at 21:18

## 2018-12-17 RX ADMIN — NICOTINE SCH MG: 21 PATCH TRANSDERMAL at 10:06

## 2018-12-17 RX ADMIN — Medication SCH TAB: at 10:06

## 2018-12-17 RX ADMIN — BACITRACIN ZINC SCH GM: 500 OINTMENT TOPICAL at 10:06

## 2018-12-18 RX ADMIN — Medication SCH MG: at 21:03

## 2018-12-18 RX ADMIN — Medication PRN MG: at 21:03

## 2018-12-18 RX ADMIN — Medication SCH TAB: at 10:01

## 2018-12-18 RX ADMIN — BACITRACIN ZINC SCH GM: 500 OINTMENT TOPICAL at 10:01

## 2018-12-18 RX ADMIN — NICOTINE SCH MG: 21 PATCH TRANSDERMAL at 10:01

## 2018-12-18 NOTE — PN
[facility] Progress Note


Note: 





PT IS PLANNED FOR  DISCHARGE TOMORROW PER HIS COUNSELOR/NURSE. PT REPORTS HE 

WILL BE FOLLOWING UP AT [facility] [place] FOR 

MEDICAL MANAGEMENT. ALERT O X 3. 


 Vital Signs











  12/18/18 12/18/18





  03:30 06:44


 


Temperature  98.1 F


 


Pulse Rate  70


 


Respiratory 18 16





Rate  


 


Blood Pressure  136/76








NAD





PLAN:D/C PT TODAY


F/U WITH PRIMARY CARE AT [facility] [place] FOR MEDICAL MANAGEMENT 1-2 WEEKS 

AFTER DISCHARGE.


FOLLOW UP AT [facility].

## 2018-12-19 VITALS — DIASTOLIC BLOOD PRESSURE: 82 MMHG | TEMPERATURE: 97.7 F | HEART RATE: 71 BPM | SYSTOLIC BLOOD PRESSURE: 129 MMHG

## 2018-12-19 RX ADMIN — Medication SCH TAB: at 09:38

## 2018-12-19 RX ADMIN — BACITRACIN ZINC SCH GM: 500 OINTMENT TOPICAL at 09:38

## 2018-12-19 RX ADMIN — NICOTINE SCH: 21 PATCH TRANSDERMAL at 09:39
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
## 2018-12-19 NOTE — PN
[facility] Progress Note


Note: 





PT COMPLETED REHAB. REFERRED TO [facility] FOR AFTERCARE. FOLLOW UP AT 

[facility].


 Vital Signs











  12/19/18





  06:41


 


Temperature 97.7 F


 


Pulse Rate 71


 


Respiratory 16





Rate 


 


Blood Pressure 129/82








NAD





PLAN:D/C'D TODAY

## 2018-12-26 PROCEDURE — 99285 EMERGENCY DEPT VISIT HI MDM: CPT | Mod: 25

## 2018-12-26 PROCEDURE — 84443 ASSAY THYROID STIM HORMONE: CPT

## 2018-12-26 PROCEDURE — 83540 ASSAY OF IRON: CPT

## 2018-12-26 PROCEDURE — 83550 IRON BINDING TEST: CPT

## 2018-12-26 PROCEDURE — 36415 COLL VENOUS BLD VENIPUNCTURE: CPT

## 2018-12-26 PROCEDURE — 85027 COMPLETE CBC AUTOMATED: CPT

## 2018-12-26 PROCEDURE — 82728 ASSAY OF FERRITIN: CPT

## 2018-12-26 PROCEDURE — 82746 ASSAY OF FOLIC ACID SERUM: CPT

## 2018-12-26 PROCEDURE — 80076 HEPATIC FUNCTION PANEL: CPT

## 2018-12-26 PROCEDURE — 85025 COMPLETE CBC W/AUTO DIFF WBC: CPT

## 2018-12-26 PROCEDURE — 80307 DRUG TEST PRSMV CHEM ANLYZR: CPT

## 2018-12-26 PROCEDURE — 80061 LIPID PANEL: CPT

## 2018-12-26 PROCEDURE — 81001 URINALYSIS AUTO W/SCOPE: CPT

## 2018-12-26 PROCEDURE — 93005 ELECTROCARDIOGRAM TRACING: CPT

## 2018-12-26 PROCEDURE — 84466 ASSAY OF TRANSFERRIN: CPT

## 2018-12-26 PROCEDURE — 86593 SYPHILIS TEST NON-TREP QUANT: CPT

## 2018-12-26 PROCEDURE — 80053 COMPREHEN METABOLIC PANEL: CPT

## 2018-12-26 PROCEDURE — 83036 HEMOGLOBIN GLYCOSYLATED A1C: CPT

## 2018-12-26 PROCEDURE — 82607 VITAMIN B-12: CPT

## 2018-12-26 PROCEDURE — 86780 TREPONEMA PALLIDUM: CPT

## 2018-12-26 PROCEDURE — 86592 SYPHILIS TEST NON-TREP QUAL: CPT

## 2019-03-05 ENCOUNTER — INPATIENT (INPATIENT)
Facility: HOSPITAL | Age: 54
LOS: 30 days | Discharge: NURSING, REHAB OR LONG-TERM CARE | DRG: 885 | End: 2019-04-05
Attending: PSYCHIATRY & NEUROLOGY | Admitting: PSYCHIATRY & NEUROLOGY
Payer: MEDICAID

## 2019-03-05 VITALS
TEMPERATURE: 98 F | OXYGEN SATURATION: 99 % | DIASTOLIC BLOOD PRESSURE: 88 MMHG | HEART RATE: 97 BPM | WEIGHT: 147.05 LBS | RESPIRATION RATE: 18 BRPM | SYSTOLIC BLOOD PRESSURE: 158 MMHG

## 2019-03-05 LAB
ALBUMIN SERPL ELPH-MCNC: 4 G/DL — SIGNIFICANT CHANGE UP (ref 3.3–5)
ALP SERPL-CCNC: 106 U/L — SIGNIFICANT CHANGE UP (ref 40–120)
ALT FLD-CCNC: 7 U/L — LOW (ref 10–45)
AMPHET UR-MCNC: NEGATIVE — SIGNIFICANT CHANGE UP
ANION GAP SERPL CALC-SCNC: 12 MMOL/L — SIGNIFICANT CHANGE UP (ref 5–17)
APPEARANCE UR: CLEAR — SIGNIFICANT CHANGE UP
AST SERPL-CCNC: 17 U/L — SIGNIFICANT CHANGE UP (ref 10–40)
BACTERIA # UR AUTO: PRESENT /HPF
BARBITURATES UR SCN-MCNC: NEGATIVE — SIGNIFICANT CHANGE UP
BASOPHILS # BLD AUTO: 0.02 K/UL — SIGNIFICANT CHANGE UP (ref 0–0.2)
BASOPHILS NFR BLD AUTO: 0.3 % — SIGNIFICANT CHANGE UP (ref 0–2)
BENZODIAZ UR-MCNC: NEGATIVE — SIGNIFICANT CHANGE UP
BILIRUB SERPL-MCNC: <0.2 MG/DL — SIGNIFICANT CHANGE UP (ref 0.2–1.2)
BILIRUB UR-MCNC: NEGATIVE — SIGNIFICANT CHANGE UP
BUN SERPL-MCNC: 12 MG/DL — SIGNIFICANT CHANGE UP (ref 7–23)
CALCIUM SERPL-MCNC: 9.6 MG/DL — SIGNIFICANT CHANGE UP (ref 8.4–10.5)
CHLORIDE SERPL-SCNC: 104 MMOL/L — SIGNIFICANT CHANGE UP (ref 96–108)
CO2 SERPL-SCNC: 26 MMOL/L — SIGNIFICANT CHANGE UP (ref 22–31)
COCAINE METAB.OTHER UR-MCNC: POSITIVE
COLOR SPEC: YELLOW — SIGNIFICANT CHANGE UP
COMMENT - URINE: SIGNIFICANT CHANGE UP
CREAT SERPL-MCNC: 0.85 MG/DL — SIGNIFICANT CHANGE UP (ref 0.5–1.3)
DIFF PNL FLD: ABNORMAL
EOSINOPHIL # BLD AUTO: 0.08 K/UL — SIGNIFICANT CHANGE UP (ref 0–0.5)
EOSINOPHIL NFR BLD AUTO: 1.4 % — SIGNIFICANT CHANGE UP (ref 0–6)
EPI CELLS # UR: SIGNIFICANT CHANGE UP /HPF (ref 0–5)
GLUCOSE SERPL-MCNC: 75 MG/DL — SIGNIFICANT CHANGE UP (ref 70–99)
GLUCOSE UR QL: NEGATIVE — SIGNIFICANT CHANGE UP
HCT VFR BLD CALC: 39.1 % — SIGNIFICANT CHANGE UP (ref 39–50)
HGB BLD-MCNC: 11.5 G/DL — LOW (ref 13–17)
IMM GRANULOCYTES NFR BLD AUTO: 0.3 % — SIGNIFICANT CHANGE UP (ref 0–1.5)
KETONES UR-MCNC: ABNORMAL MG/DL
LEUKOCYTE ESTERASE UR-ACNC: NEGATIVE — SIGNIFICANT CHANGE UP
LYMPHOCYTES # BLD AUTO: 1.34 K/UL — SIGNIFICANT CHANGE UP (ref 1–3.3)
LYMPHOCYTES # BLD AUTO: 23.1 % — SIGNIFICANT CHANGE UP (ref 13–44)
MCHC RBC-ENTMCNC: 23 PG — LOW (ref 27–34)
MCHC RBC-ENTMCNC: 29.4 GM/DL — LOW (ref 32–36)
MCV RBC AUTO: 78 FL — LOW (ref 80–100)
METHADONE UR-MCNC: NEGATIVE — SIGNIFICANT CHANGE UP
MONOCYTES # BLD AUTO: 0.4 K/UL — SIGNIFICANT CHANGE UP (ref 0–0.9)
MONOCYTES NFR BLD AUTO: 6.9 % — SIGNIFICANT CHANGE UP (ref 2–14)
NEUTROPHILS # BLD AUTO: 3.93 K/UL — SIGNIFICANT CHANGE UP (ref 1.8–7.4)
NEUTROPHILS NFR BLD AUTO: 68 % — SIGNIFICANT CHANGE UP (ref 43–77)
NITRITE UR-MCNC: NEGATIVE — SIGNIFICANT CHANGE UP
NRBC # BLD: 0 /100 WBCS — SIGNIFICANT CHANGE UP (ref 0–0)
OPIATES UR-MCNC: NEGATIVE — SIGNIFICANT CHANGE UP
PCP SPEC-MCNC: SIGNIFICANT CHANGE UP
PCP UR-MCNC: NEGATIVE — SIGNIFICANT CHANGE UP
PH UR: 6.5 — SIGNIFICANT CHANGE UP (ref 5–8)
PLATELET # BLD AUTO: 225 K/UL — SIGNIFICANT CHANGE UP (ref 150–400)
POTASSIUM SERPL-MCNC: 4.3 MMOL/L — SIGNIFICANT CHANGE UP (ref 3.5–5.3)
POTASSIUM SERPL-SCNC: 4.3 MMOL/L — SIGNIFICANT CHANGE UP (ref 3.5–5.3)
PROT SERPL-MCNC: 7.5 G/DL — SIGNIFICANT CHANGE UP (ref 6–8.3)
PROT UR-MCNC: NEGATIVE MG/DL — SIGNIFICANT CHANGE UP
RBC # BLD: 5.01 M/UL — SIGNIFICANT CHANGE UP (ref 4.2–5.8)
RBC # FLD: 22.7 % — HIGH (ref 10.3–14.5)
RBC CASTS # UR COMP ASSIST: ABNORMAL /HPF
SODIUM SERPL-SCNC: 142 MMOL/L — SIGNIFICANT CHANGE UP (ref 135–145)
SP GR SPEC: 1.02 — SIGNIFICANT CHANGE UP (ref 1–1.03)
THC UR QL: NEGATIVE — SIGNIFICANT CHANGE UP
UROBILINOGEN FLD QL: 0.2 E.U./DL — SIGNIFICANT CHANGE UP
WBC # BLD: 5.79 K/UL — SIGNIFICANT CHANGE UP (ref 3.8–10.5)
WBC # FLD AUTO: 5.79 K/UL — SIGNIFICANT CHANGE UP (ref 3.8–10.5)
WBC UR QL: < 5 /HPF — SIGNIFICANT CHANGE UP

## 2019-03-05 PROCEDURE — 99285 EMERGENCY DEPT VISIT HI MDM: CPT | Mod: 25

## 2019-03-05 PROCEDURE — 93010 ELECTROCARDIOGRAM REPORT: CPT

## 2019-03-05 RX ORDER — RISPERIDONE 4 MG/1
1 TABLET ORAL
Qty: 0 | Refills: 0 | Status: DISCONTINUED | OUTPATIENT
Start: 2019-03-05 | End: 2019-03-08

## 2019-03-05 RX ORDER — RISPERIDONE 4 MG/1
1 TABLET ORAL ONCE
Qty: 0 | Refills: 0 | Status: COMPLETED | OUTPATIENT
Start: 2019-03-05 | End: 2019-03-05

## 2019-03-05 RX ORDER — ACETAMINOPHEN 500 MG
650 TABLET ORAL EVERY 6 HOURS
Qty: 0 | Refills: 0 | Status: DISCONTINUED | OUTPATIENT
Start: 2019-03-05 | End: 2019-04-04

## 2019-03-05 RX ORDER — HALOPERIDOL DECANOATE 100 MG/ML
5 INJECTION INTRAMUSCULAR EVERY 6 HOURS
Qty: 0 | Refills: 0 | Status: DISCONTINUED | OUTPATIENT
Start: 2019-03-05 | End: 2019-04-04

## 2019-03-05 RX ORDER — DIPHENHYDRAMINE HCL 50 MG
25 CAPSULE ORAL EVERY 6 HOURS
Qty: 0 | Refills: 0 | Status: DISCONTINUED | OUTPATIENT
Start: 2019-03-05 | End: 2019-04-04

## 2019-03-05 RX ORDER — HYDROXYZINE HCL 10 MG
25 TABLET ORAL EVERY 6 HOURS
Qty: 0 | Refills: 0 | Status: DISCONTINUED | OUTPATIENT
Start: 2019-03-05 | End: 2019-04-04

## 2019-03-05 RX ORDER — ACETAMINOPHEN 500 MG
650 TABLET ORAL EVERY 6 HOURS
Qty: 0 | Refills: 0 | Status: DISCONTINUED | OUTPATIENT
Start: 2019-03-05 | End: 2019-03-05

## 2019-03-05 RX ADMIN — Medication 25 MILLIGRAM(S): at 23:51

## 2019-03-05 RX ADMIN — RISPERIDONE 1 MILLIGRAM(S): 4 TABLET ORAL at 22:03

## 2019-03-05 NOTE — ED ADULT NURSE REASSESSMENT NOTE - NS ED NURSE REASSESS COMMENT FT1
Pt received from day shift RN. Presented w/ c/o SI, denies HI. PT received on 1:1, tech noted @ bedside.

## 2019-03-05 NOTE — ED BEHAVIORAL HEALTH ASSESSMENT NOTE - AXIS IV
Problems with primary support/Problems with interaction with legal system/Economic problems/Housing problems

## 2019-03-05 NOTE — ED BEHAVIORAL HEALTH ASSESSMENT NOTE - SUBSTANCE ISSUES AND PLAN (INCLUDE STANDING AND PRN MEDICATION)
polysubstance use d/o, would benefit from dual diagnosis treatment, VANWA & CINA Polysubstance USe - current episode EtOH, will prescribe Ativan 2mg PO q4hr prn S&S EtOH withdrawal

## 2019-03-05 NOTE — ED BEHAVIORAL HEALTH ASSESSMENT NOTE - SUMMARY
52yo M, pph polysubstance (cocaine/heroin/etoh/cannabis/benzo) use d/o, susbtance induced mood d/o & psychosis vs. bipolar or schizoaffective who again presents to [facility] seeking admission to [facility] with SI in context of substance use. Pt. is now agreeable to   2)F/U UTOX results.   3)Would not give psychotropics at this time, as I suspect substance induced mood d/o/psychosis. I wonder if he needs psychotropics at all.   4)Can give haldol 5 mg q6h prn agitation.  5)CIWA & CINA 54yo M, pph polysubstance (cocaine/heroin/etoh/cannabis/benzo) use d/o, susbtance induced mood d/o & psychosis vs. bipolar or schizoaffective who again presents to [facility] seeking admission to [facility] with SI in context of substance use. Voluntary 9.13 Admission. Pt. is now agreeable to treatment with [facility].   2)F/U UTOX results.   3)Received Risperdal 1mg in the ED 52yo M, pph polysubstance (cocaine/heroin/etoh/cannabis/benzo) use d/o, susbtance induced mood d/o & psychosis vs. bipolar or schizoaffective who again presents to [facility] seeking admission to [facility] with SI in context of substance use.   1)9.13 Admission. Pt. is now agreeable to treatment with [facility].   2)F/U UTOX results.   3)Received Risperdal 1mg in the ED

## 2019-03-05 NOTE — ED PROVIDER NOTE - OBJECTIVE STATEMENT
54 y/o m with PMH of SI presents to ED with SI pt states triggered from mother passing away two years prior.  Pt admits to etoh and crack cocaine use.  Does not currently take medications.  Pt hospitalized at [facility] several times in past.  No physical complaints in ED.  Pt cooperative and calm.

## 2019-03-05 NOTE — ED ADULT NURSE NOTE - OBJECTIVE STATEMENT
Pt reports SI for 2 weeks, denies HI. Pt reports using crack cocaine, denies any other drug/etoh use. Pt reports He wants to jumpin front of a train. Pt reports suicide attempt in the past using "pills."

## 2019-03-05 NOTE — ED BEHAVIORAL HEALTH ASSESSMENT NOTE - PAST PSYCHOTROPIC MEDICATION
Has been on zyprexa, invega, depakote, cymbalta, neurontin, and zoloft in past. zyprexa, invega, depakote, cymbalta, neurontin, and zoloft in past.

## 2019-03-05 NOTE — ED BEHAVIORAL HEALTH ASSESSMENT NOTE - DIFFERENTIAL
polysubstance (cocaine/heroin/etoh/cannabis/benzo) use d/o, susbtance induced mood d/o & psychosis vs. bipolar or schizoaffective who [facility] comes [facility] with SI in context of substance use. Never adherent to outpt treatment or meds. Secondary gain, malingering, and antisocial traits suspected. polysubstance (cocaine/heroin/etoh/cannabis/benzo) use d/o, susbtance induced mood d/o & psychosis vs. bipolar or schizoaffective.

## 2019-03-05 NOTE — ED BEHAVIORAL HEALTH ASSESSMENT NOTE - DETAILS
current plan to jump in front of a train Pt reports voices commanding him to jump in front of train spoke with [facility] nursing and with Max Lawson and Britt about patient self-referred information regarding pt was communicated to treatment team

## 2019-03-05 NOTE — ED ADULT NURSE NOTE - NSIMPLEMENTINTERV_GEN_ALL_ED
Implemented All Universal Safety Interventions:  [place] to call system. Call bell, personal items and telephone within reach. Instruct patient to call for assistance. Room bathroom lighting operational. Non-slip footwear when patient is off stretcher. Physically safe environment: no spills, clutter or unnecessary equipment. Stretcher in lowest position, wheels locked, appropriate side rails in place.

## 2019-03-05 NOTE — ED BEHAVIORAL HEALTH ASSESSMENT NOTE - MEDICAL ISSUES AND PLAN (INCLUDE STANDING AND PRN MEDICATION)
Pt should be followed by medicine for new anemia with low MCV, abnormal platelet and RBC morphology, & increased MCHC, RDW Risperdal 1mg BID; Haldol 5mg prn agitation

## 2019-03-05 NOTE — ED BEHAVIORAL HEALTH ASSESSMENT NOTE - RISK ASSESSMENT
continue 1:1 for SI in ED but could go to routine observation on [facility] Pt. currently reports he feels safe in the hospital and should he start to have thoughts of hurting self states he will immediately seek the help of hospital staff.

## 2019-03-05 NOTE — ED PROVIDER NOTE - CLINICAL SUMMARY MEDICAL DECISION MAKING FREE TEXT BOX
Pt presenting with SI - seen by psych who agrees on admission.  Requests risperdal in ED.  Pt calm throughout ED stay.  Pt medically cleared for admission.

## 2019-03-05 NOTE — ED ADULT NURSE NOTE - CAS TRG GENERAL NORM CIRC DET
I performed a face-to-face evaluation of the patient and performed a history and physical examination. I agree with the history and physical examination.    Yesenia: 77 F, dementia, nephrostomy, on Abx recently for UTI, p/w watery diarrhea 4 x/day, no F or abd pain. Appears chronically ill. NAD. Abd NT/ND. No LE edema. Concern for C. diff. Plan: Flagyl, labs, IVF, admit. Capillary refill less/equal to 2 seconds/Strong peripheral pulses

## 2019-03-05 NOTE — ED BEHAVIORAL HEALTH ASSESSMENT NOTE - LEGAL HISTORY
Denied but had been in [facility] for 18 mos for something as per last chart. senior care for 18mo as per last chart.
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
## 2019-03-05 NOTE — ED BEHAVIORAL HEALTH ASSESSMENT NOTE - OTHER PAST PSYCHIATRIC HISTORY (INCLUDE DETAILS REGARDING ONSET, COURSE OF ILLNESS, INPATIENT/OUTPATIENT TREATMENT)
Multiple past psych hospitalizations, 4 at [facility], multiple at [facility]. Has often gotten outpt care at [facility]. 1 past SA by OD. Has been on zyprexa, invega, depakote, cymbalta, neurontin, and zoloft in past.

## 2019-03-05 NOTE — ED ADULT TRIAGE NOTE - TEMPERATURE IN FAHRENHEIT (DEGREES F)
"Oncology Rooming Note    February 15, 2019 5:04 PM   Angel Yanez is a 58 year old male who presents for:    Chief Complaint   Patient presents with     Labs Only     cvc, heparin locked, vitals checked     RECHECK     MM~ here for provider visit     Initial Vitals: /71 (BP Location: Right arm, Patient Position: Sitting, Cuff Size: Adult Regular)   Pulse 90   Temp 98.8  F (37.1  C) (Oral)   Resp 16   Ht 1.75 m (5' 8.9\")   Wt 84.1 kg (185 lb 4.8 oz)   SpO2 96%   BMI 27.44 kg/m   Estimated body mass index is 27.44 kg/m  as calculated from the following:    Height as of this encounter: 1.75 m (5' 8.9\").    Weight as of this encounter: 84.1 kg (185 lb 4.8 oz). Body surface area is 2.02 meters squared.  No Pain (0) Comment: Data Unavailable   No LMP for male patient.  Allergies reviewed: Yes  Medications reviewed: Yes    Medications: Medication refills not needed today.  Pharmacy name entered into EPIC:    [facility] - [place], [place] - [address]  [facility] - [place], [place] - [address]    Clinical concerns: no MD was NOT notified.    8 minutes for nursing intake (face to face time)     Marry Astorga RN              " 98.1

## 2019-03-05 NOTE — ED BEHAVIORAL HEALTH ASSESSMENT NOTE - PSYCHIATRIC ISSUES AND PLAN (INCLUDE STANDING AND PRN MEDICATION)
substance induced mood d/o, haldol 5 mg prn agitation, no standing meds. suicidal ideation and auditory hallucinations - Risperdal 1mg BID & Haldol 5mg prn agitation

## 2019-03-05 NOTE — ED BEHAVIORAL HEALTH ASSESSMENT NOTE - HPI (INCLUDE ILLNESS QUALITY, SEVERITY, DURATION, TIMING, CONTEXT, MODIFYING FACTORS, ASSOCIATED SIGNS AND SYMPTOMS)
52yo  M, PPHx of cocaine, heroin, etoh, and cannabis use disorders and self reported Bipolar most recent episode manic, who was BIB self due to SI to jump in front of train in context of cocaine and alcohol relapse, similar to recent psychiatric admission in Nov 2018. The patient is an unreliable historian, often contradicting himself, and sometimes lying to me. For example he denied using any substances other than cocaine but our past charts indicate a well-documented history of heroin, etoh, and cannabis use. He denied any legal history but has been in [facility] for 18 months: charges unknown.  Patient has reported hx of bipolar and of schizoaffective but has also been given diagnosis of antisocial PD on our unit in the past. He reports one suicide attempt in the past when took twenty 300 mg neurontin pills in an od. He said that was his only attempt ever but old documentation reports he overdosed on zyprexa. He has had frequent similar presentations here where he had SI in the context of cocaine/heroin/cannabis/etoh use and has been admitted to [facility] thrice in past couple of years (1/15/18-2/1/18, 8/7/17-9/5/17, and 6/6/17-7/5/17. All three times he was medicated and has used Invega, zyprexa, depakote, zoloft, neurontin, and cymbalta here at [facility]. He is also a frequent user of [facility] psychiatric services. He says most recently he had been on depakote and zyprexa prescribed at [facility] but he admits he is rarely adherent to psychiatric meds upon discharge. He has been to detox and rehab several times in the past. He voices interest in rehab to stop using cocaine but at the same time was not honest with me about use of other substances so I think he is precontemplative at best. Wants admission. Secondary gain/malingering is very likely. I think most likely diagnosis is polysubstance use d.o and substance induced mood d/o and/or psychosis.   The patient also reports using cocaine yesterday. The patient is never able to provide any collateral information as he states he has no friends or family, and reports that he has been living in [facility] shelter or on streets, panhandling for money to purchase more drugs. . The patient cannot provide the name of his most recent psychiatrist or the hospital he was discharged from. The patient endorses SI, with plan to jump in front of train, but also expresses future orientation stating that he wants to get help with housing. The patient denies any HI/PI/AVH. He does say he last heard voices yesterday. The patient denies manic or psychotic symptoms. No signs of etoh wdl but very itchy and has scratch or bite bhupinder scars on upper arms. Can't stop scratching his back and arms during interview. He says he recently had bug bites: "white bugs."Formication also very possible. 54yo  M, PPHx of cocaine, heroin, EtOH, and cannabis use disorders and self reported Bipolar most recent episode depression, who was BIB self due to SI to jump in front of train in context of cocaine and alcohol relapse, similar to recent psychiatric admission in Nov 2018.  Pt. admits to relapse on crack/cocaine and EtOH earlier today.  He is not clear as to why he was not able to follow up with outpatient providers.  He states he feels safe in the hospital right now, but should he return to the streets in his current state he fears for his life as his depression is so severe he will end his life.  He states he is currently hearing voices telling him to "jump in front of a train." He denies VH. He denies HI.   He reports his mood as "depressed" and endorses difficulty with appetite, sleep, energy, and concentration.  Discussed with pt was the use of ZAMORA given his hx of poor compliance and pt stated he would accept an injection prior to his discharge. 54yo  M, PPHx of cocaine, heroin, EtOH, and cannabis use disorders and self reported Bipolar most recent episode depression, who was BIB self due to SI to jump in front of train in context of cocaine and alcohol relapse, similar to recent psychiatric admission in Nov 2018.  Pt. admits to relapse on crack/cocaine and EtOH earlier today.  He is not clear as to why he was not able to follow up with outpatient providers.  He states he feels safe in the hospital right now, but should he return to the streets in his current state he fears for his life as his depression is so severe he will end his life.  He states he is currently hearing voices telling him to "jump in front of a train." He denies VH. He denies HI.   He reports his mood as "depressed" and endorses difficulty with appetite, sleep, energy, and concentration.  Discussed with pt was the use of ZAMORA given his hx of poor compliance and pt stated he would accept an injection prior to his discharge.  Pt. reports to have been drinking earlier today, he denies any previous episodes of withdrawal seizure and states he has only experienced "shakes" in the past.

## 2019-03-05 NOTE — ED ADULT TRIAGE NOTE - CHIEF COMPLAINT QUOTE
Pt presents c/o SI w/ plan and access to means.  Pt denies HI or paranoia.  Pt is pending security for belongings check.  One-to-one initiated, ECG in progress.

## 2019-03-06 DIAGNOSIS — F14.10 COCAINE ABUSE, UNCOMPLICATED: ICD-10-CM

## 2019-03-06 DIAGNOSIS — F20.9 SCHIZOPHRENIA, UNSPECIFIED: ICD-10-CM

## 2019-03-06 DIAGNOSIS — F10.10 ALCOHOL ABUSE, UNCOMPLICATED: ICD-10-CM

## 2019-03-06 PROCEDURE — 99233 SBSQ HOSP IP/OBS HIGH 50: CPT

## 2019-03-06 RX ADMIN — Medication 25 MILLIGRAM(S): at 21:14

## 2019-03-06 RX ADMIN — RISPERIDONE 1 MILLIGRAM(S): 4 TABLET ORAL at 21:14

## 2019-03-06 RX ADMIN — RISPERIDONE 1 MILLIGRAM(S): 4 TABLET ORAL at 10:52

## 2019-03-06 NOTE — PROGRESS NOTE BEHAVIORAL HEALTH - NSBHFUPINTERVALHXFT_PSY_A_CORE
in bed all day except for meals;   MSE: stays under the covers.  avoids eye contact.  appears disheveled;  cognition is intact; not cooperative with structured interview; denies AVH or s/h i/i/p; speech soft; not spontaneous; unclear at times;  no tremor or rigidity;   preoccupied and blocked; affect generally constricted.  guarded and evasive.  i&j: poor.  accepts treatment; however not reflective on sxs or situation.

## 2019-03-06 NOTE — PROGRESS NOTE BEHAVIORAL HEALTH - NSBHFUPINTERVALCCFT_PSY_A_CORE
avoids discussions.  however later slightly more conversational from the bed acknowledging AH and SI and barely subatance abuse

## 2019-03-06 NOTE — PROGRESS NOTE BEHAVIORAL HEALTH - SUMMARY
54yo  M, PPHx of cocaine, heroin, EtOH, and cannabis use disorders and self reported Bipolar most recent episode depression, who was BIB self due to SI to jump in front of train in context of cocaine and alcohol relapse, similar to recent psychiatric admission in Nov 2018.  Pt. admits to relapse on crack/cocaine and EtOH earlier today.  He is not clear as to why he was not able to follow up with outpatient providers.  He states he feels safe in the hospital right now, but should he return to the streets in his current state he fears for his life as his depression is so severe he will end his life.  He states he is currently hearing voices telling him to "jump in front of a train." He denies VH. He denies HI.   He reports his mood as "depressed" and endorses difficulty with appetite, sleep, energy, and concentration.  Discussed with pt was the use of ZAMORA given his hx of poor compliance and pt stated he would accept an injection prior to his discharge.  Pt. reports to have been drinking earlier today, he denies any previous episodes of withdrawal seizure and states he has only experienced "shakes" in the past.    ;;3/6: avoidant; under the covers; smiles strangely much like grievous admissions; in past  has had AH as above; resuming Risperdal trituration with likely transition to ZAMORA.  Review community support and may need enhanced community support in view of frequent readmissions.

## 2019-03-07 PROCEDURE — 99232 SBSQ HOSP IP/OBS MODERATE 35: CPT

## 2019-03-07 RX ADMIN — Medication 25 MILLIGRAM(S): at 21:59

## 2019-03-07 RX ADMIN — RISPERIDONE 1 MILLIGRAM(S): 4 TABLET ORAL at 21:58

## 2019-03-07 NOTE — PROGRESS NOTE BEHAVIORAL HEALTH - NSBHFUPINTERVALHXFT_PSY_A_CORE
in bed little eye contact; minimal speech; internally preoccupied; inapprpriate affect; staff reports refusal to allow routine procedures such as vital signs; however compliant in bed little eye contact; minimal speech; internally preoccupied; inappropriate affect; staff reports refusal to allow routine procedures such as vital signs; however compliant;  later in day informed that the patient has an ACT team; to meet with patient tomorrow.

## 2019-03-07 NOTE — PROGRESS NOTE BEHAVIORAL HEALTH - SUMMARY
52yo  M, PPHx of cocaine, heroin, EtOH, and cannabis use disorders and self reported Bipolar most recent episode depression, who was BIB self due to SI to jump in front of train in context of cocaine and alcohol relapse, similar to recent psychiatric admission in Nov 2018.  Pt. admits to relapse on crack/cocaine and EtOH earlier today.  He is not clear as to why he was not able to follow up with outpatient providers.  He states he feels safe in the hospital right now, but should he return to the streets in his current state he fears for his life as his depression is so severe he will end his life.  He states he is currently hearing voices telling him to "jump in front of a train." He denies VH. He denies HI.   He reports his mood as "depressed" and endorses difficulty with appetite, sleep, energy, and concentration.  Discussed with pt was the use of ZAMORA given his hx of poor compliance and pt stated he would accept an injection prior to his discharge.  Pt. reports to have been drinking earlier today, he denies any previous episodes of withdrawal seizure and states he has only experienced "shakes" in the past.    ;;3/6: avoidant; under the covers; smiles strangely much like preivous admissions; in past  has had AH as above; resuming Risperdal titratration with likely transition to ZAMORA.  Review community support and may need enhanced community support in view of frequent readmissions.      ;;3/7: stays in bed; minimal responses; indicates he has AH and SI but mostly smiles silently;.  in bed little eye contact; minimal speech; internally preoccupied; inapprpriate affect; staff reports refusal to allow routine procedures such as vital signs; however compliant;.  continue Risperal titration and encourage socialization and compliant with routine procedures.

## 2019-03-08 PROCEDURE — 99232 SBSQ HOSP IP/OBS MODERATE 35: CPT

## 2019-03-08 RX ORDER — RISPERIDONE 4 MG/1
1.5 TABLET ORAL
Qty: 0 | Refills: 0 | Status: DISCONTINUED | OUTPATIENT
Start: 2019-03-08 | End: 2019-03-13

## 2019-03-08 RX ADMIN — Medication 25 MILLIGRAM(S): at 21:05

## 2019-03-08 RX ADMIN — RISPERIDONE 1.5 MILLIGRAM(S): 4 TABLET ORAL at 21:04

## 2019-03-08 RX ADMIN — RISPERIDONE 1.5 MILLIGRAM(S): 4 TABLET ORAL at 11:19

## 2019-03-08 NOTE — PROGRESS NOTE BEHAVIORAL HEALTH - SUMMARY
54yo  M, PPHx of cocaine, heroin, EtOH, and cannabis use disorders and self reported Bipolar most recent episode depression, who was BIB self due to SI to jump in front of train in context of cocaine and alcohol relapse, similar to recent psychiatric admission in Nov 2018.  Pt. admits to relapse on crack/cocaine and EtOH earlier today.  He is not clear as to why he was not able to follow up with outpatient providers.  He states he feels safe in the hospital right now, but should he return to the streets in his current state he fears for his life as his depression is so severe he will end his life.  He states he is currently hearing voices telling him to "jump in front of a train." He denies VH. He denies HI.   He reports his mood as "depressed" and endorses difficulty with appetite, sleep, energy, and concentration.  Discussed with pt was the use of ZAMORA given his hx of poor compliance and pt stated he would accept an injection prior to his discharge.  Pt. reports to have been drinking earlier today, he denies any previous episodes of withdrawal seizure and states he has only experienced "shakes" in the past.    ;;3/6: avoidant; under the covers; smiles strangely much like preivous admissions; in past  has had AH as above; resuming Risperdal titratration with likely transition to ZAMORA.  Review community support and may need enhanced community support in view of frequent readmissions.      ;;3/7: stays in bed; minimal responses; indicates he has AH and SI but mostly smiles silently;.  in bed little eye contact; minimal speech; internally preoccupied; inapprpriate affect; staff reports refusal to allow routine procedures such as vital signs; however compliant;.  continue Risperal titration and encourage socialization and compliant with routine procedures.   ;;3/8: minimal responses; lying in bed eyes closed while awake; smiles inidcates he is okay but endorses SI and AH vaguely;.   in bed little eye contact; minimal speech; internally preoccupied; inapprpriate affect; staff reports refusal to allow routine procedures such as vital signs; however compliant;.  continue Risperal titration and encourage socialization and compliant with routine procedures.   bp wnl; raising Risperdal to 1.5mg po bid.

## 2019-03-08 NOTE — PROGRESS NOTE BEHAVIORAL HEALTH - DETAILS
Pt reports voices commanding him to jump in front of train Pt reported voices commanding him to jump in front of train

## 2019-03-08 NOTE — PROGRESS NOTE BEHAVIORAL HEALTH - OTHER
did not assess impoverished thinking ; guarded; small amount of speech makes it difficult to assess at this time.

## 2019-03-08 NOTE — PROGRESS NOTE BEHAVIORAL HEALTH - NSBHFUPINTERVALHXFT_PSY_A_CORE
in bed little eye contact; minimal speech; internally preoccupied; inapprpriate affect; staff reports refusal to allow routine procedures such as vital signs; however compliant in bed little eye contact; minimal speech; internally preoccupied; inappropriate affect; staff reports refusal to allow routine procedures such as vital signs; however compliant

## 2019-03-08 NOTE — PROGRESS NOTE BEHAVIORAL HEALTH - NSBHFUPINTERVALCCFT_PSY_A_CORE
minimal responses; lying in bed eyes closed while awake; smiles inidcates he is okay but endorses SI and AH vaguely minimal responses; lying in bed eyes closed while awake; smiles indicates he is okay but endorses SI and AH vaguely

## 2019-03-09 RX ADMIN — RISPERIDONE 1.5 MILLIGRAM(S): 4 TABLET ORAL at 21:13

## 2019-03-09 RX ADMIN — Medication 25 MILLIGRAM(S): at 21:13

## 2019-03-10 RX ADMIN — Medication 25 MILLIGRAM(S): at 21:48

## 2019-03-10 RX ADMIN — RISPERIDONE 1.5 MILLIGRAM(S): 4 TABLET ORAL at 21:48

## 2019-03-10 RX ADMIN — RISPERIDONE 1.5 MILLIGRAM(S): 4 TABLET ORAL at 11:56

## 2019-03-11 PROCEDURE — 99232 SBSQ HOSP IP/OBS MODERATE 35: CPT

## 2019-03-11 RX ADMIN — RISPERIDONE 1.5 MILLIGRAM(S): 4 TABLET ORAL at 22:01

## 2019-03-11 RX ADMIN — Medication 25 MILLIGRAM(S): at 22:01

## 2019-03-11 NOTE — PROGRESS NOTE BEHAVIORAL HEALTH - NSBHFUPINTERVALCCFT_PSY_A_CORE
mood improving slightly still endorses AH and SI but spends more time watching TV;  not conversational

## 2019-03-11 NOTE — PROGRESS NOTE BEHAVIORAL HEALTH - SUMMARY
54yo  M, PPHx of cocaine, heroin, EtOH, and cannabis use disorders and self reported Bipolar most recent episode depression, who was BIB self due to SI to jump in front of train in context of cocaine and alcohol relapse, similar to recent psychiatric admission in Nov 2018.  Pt. admits to relapse on crack/cocaine and EtOH earlier today.  He is not clear as to why he was not able to follow up with outpatient providers.  He states he feels safe in the hospital right now, but should he return to the streets in his current state he fears for his life as his depression is so severe he will end his life.  He states he is currently hearing voices telling him to "jump in front of a train." He denies VH. He denies HI.   He reports his mood as "depressed" and endorses difficulty with appetite, sleep, energy, and concentration.  Discussed with pt was the use of ZAMORA given his hx of poor compliance and pt stated he would accept an injection prior to his discharge.  Pt. reports to have been drinking earlier today, he denies any previous episodes of withdrawal seizure and states he has only experienced "shakes" in the past.    ;;3/6: avoidant; under the covers; smiles strangely much like preivous admissions; in past  has had AH as above; resuming Risperdal titratration with likely transition to ZAMORA.  Review community support and may need enhanced community support in view of frequent readmissions.      ;;3/7: stays in bed; minimal responses; indicates he has AH and SI but mostly smiles silently;.  in bed little eye contact; minimal speech; internally preoccupied; inapprpriate affect; staff reports refusal to allow routine procedures such as vital signs; however compliant;.  continue Risperal titration and encourage socialization and compliant with routine procedures.   ;;3/8: minimal responses; lying in bed eyes closed while awake; smiles inidcates he is okay but endorses SI and AH vaguely;.   in bed little eye contact; minimal speech; internally preoccupied; inapprpriate affect; staff reports refusal to allow routine procedures such as vital signs; however compliant;.  continue Risperal titration and encourage socialization and compliant with routine procedures.   bp wnl; raising Risperdal to 1.5mg po bid.   ;;3/11: mood improving slightly still endorses AH and SI but spends more time watching TV;  not conversational;.  in bed little eye contact; minimal speech; internally preoccupied; inapprpriate affect; staff reports refusal to allow routine procedures such as vital signs; however compliant;.  continue Risperal titration and encourage socialization and compliant with routine procedures.   bp wnl; raising Risperdal to 2 mg po bid.

## 2019-03-12 PROCEDURE — 99232 SBSQ HOSP IP/OBS MODERATE 35: CPT

## 2019-03-12 RX ADMIN — Medication 25 MILLIGRAM(S): at 21:17

## 2019-03-12 RX ADMIN — RISPERIDONE 1.5 MILLIGRAM(S): 4 TABLET ORAL at 21:17

## 2019-03-12 RX ADMIN — RISPERIDONE 1.5 MILLIGRAM(S): 4 TABLET ORAL at 10:42

## 2019-03-12 NOTE — PROGRESS NOTE BEHAVIORAL HEALTH - NSBHFUPINTERVALCCFT_PSY_A_CORE
wants to stay three weeks and leave before he gets his check on first of month; discussed substance abuse/depence issues; states voices and SI are decreasing;  not verbal ; monosyllabic replies

## 2019-03-12 NOTE — PROGRESS NOTE BEHAVIORAL HEALTH - OTHER
did not assess impoverished thinking ; guarded; small amount of speech makes it difficult to assess at this time. impoverished

## 2019-03-12 NOTE — PROGRESS NOTE BEHAVIORAL HEALTH - NSBHFUPINTERVALHXFT_PSY_A_CORE
in bed little eye contact; minimal speech; internally preoccupied; inapprpriate affect.  does smile strangely more often

## 2019-03-12 NOTE — PROGRESS NOTE BEHAVIORAL HEALTH - SUMMARY
52yo  M, PPHx of cocaine, heroin, EtOH, and cannabis use disorders and self reported Bipolar most recent episode depression, who was BIB self due to SI to jump in front of train in context of cocaine and alcohol relapse, similar to recent psychiatric admission in Nov 2018.  Pt. admits to relapse on crack/cocaine and EtOH earlier today.  He is not clear as to why he was not able to follow up with outpatient providers.  He states he feels safe in the hospital right now, but should he return to the streets in his current state he fears for his life as his depression is so severe he will end his life.  He states he is currently hearing voices telling him to "jump in front of a train." He denies VH. He denies HI.   He reports his mood as "depressed" and endorses difficulty with appetite, sleep, energy, and concentration.  Discussed with pt was the use of ZAMORA given his hx of poor compliance and pt stated he would accept an injection prior to his discharge.  Pt. reports to have been drinking earlier today, he denies any previous episodes of withdrawal seizure and states he has only experienced "shakes" in the past.    ;;3/6: avoidant; under the covers; smiles strangely much like preivous admissions; in past  has had AH as above; resuming Risperdal titratration with likely transition to ZAMORA.  Review community support and may need enhanced community support in view of frequent readmissions.      ;;3/7: stays in bed; minimal responses; indicates he has AH and SI but mostly smiles silently;.  in bed little eye contact; minimal speech; internally preoccupied; inapprpriate affect; staff reports refusal to allow routine procedures such as vital signs; however compliant;.  continue Risperal titration and encourage socialization and compliant with routine procedures.   ;;3/8: minimal responses; lying in bed eyes closed while awake; smiles inidcates he is okay but endorses SI and AH vaguely;.   in bed little eye contact; minimal speech; internally preoccupied; inapprpriate affect; staff reports refusal to allow routine procedures such as vital signs; however compliant;.  continue Risperal titration and encourage socialization and compliant with routine procedures.   bp wnl; raising Risperdal to 1.5mg po bid.   ;;3/11: mood improving slightly still endorses AH and SI but spends more time watching TV;  not conversational;.  in bed little eye contact; minimal speech; internally preoccupied; inapprpriate affect; staff reports refusal to allow routine procedures such as vital signs; however compliant;.  continue Risperal titration and encourage socialization and compliant with routine procedures.   bp wnl; raising Risperdal to 2 mg po bid.    ;;3/12: wants to stay three weeks and leave before he gets his check on first of month; discussed substance abuse/depence issues; states voices and SI are decreasing;  not verbal ; monosyllabic replies;.   in bed little eye contact; minimal speech; internally preoccupied; inapprpriate affect.  does smile strangely more often ;.  continue Risperdal titration for mood and psychotic sxs AH.

## 2019-03-13 PROCEDURE — 99232 SBSQ HOSP IP/OBS MODERATE 35: CPT

## 2019-03-13 RX ORDER — NICOTINE POLACRILEX 2 MG
1 GUM BUCCAL DAILY
Qty: 0 | Refills: 0 | Status: DISCONTINUED | OUTPATIENT
Start: 2019-03-13 | End: 2019-04-05

## 2019-03-13 RX ORDER — RISPERIDONE 4 MG/1
2 TABLET ORAL
Qty: 0 | Refills: 0 | Status: DISCONTINUED | OUTPATIENT
Start: 2019-03-13 | End: 2019-03-18

## 2019-03-13 RX ADMIN — RISPERIDONE 1.5 MILLIGRAM(S): 4 TABLET ORAL at 10:59

## 2019-03-13 RX ADMIN — RISPERIDONE 2 MILLIGRAM(S): 4 TABLET ORAL at 21:42

## 2019-03-13 RX ADMIN — Medication 25 MILLIGRAM(S): at 21:42

## 2019-03-13 NOTE — PROGRESS NOTE BEHAVIORAL HEALTH - SUMMARY
54yo  M, PPHx of cocaine, heroin, EtOH, and cannabis use disorders and self reported Bipolar most recent episode depression, who was BIB self due to SI to jump in front of train in context of cocaine and alcohol relapse, similar to recent psychiatric admission in Nov 2018.  Pt. admits to relapse on crack/cocaine and EtOH earlier today.  He is not clear as to why he was not able to follow up with outpatient providers.  He states he feels safe in the hospital right now, but should he return to the streets in his current state he fears for his life as his depression is so severe he will end his life.  He states he is currently hearing voices telling him to "jump in front of a train." He denies VH. He denies HI.   He reports his mood as "depressed" and endorses difficulty with appetite, sleep, energy, and concentration.  Discussed with pt was the use of ZAMORA given his hx of poor compliance and pt stated he would accept an injection prior to his discharge.  Pt. reports to have been drinking earlier today, he denies any previous episodes of withdrawal seizure and states he has only experienced "shakes" in the past.    ;;3/6: avoidant; under the covers; smiles strangely much like preivous admissions; in past  has had AH as above; resuming Risperdal titratration with likely transition to ZAMORA.  Review community support and may need enhanced community support in view of frequent readmissions.      ;;3/7: stays in bed; minimal responses; indicates he has AH and SI but mostly smiles silently;.  in bed little eye contact; minimal speech; internally preoccupied; inapprpriate affect; staff reports refusal to allow routine procedures such as vital signs; however compliant;.  continue Risperal titration and encourage socialization and compliant with routine procedures.   ;;3/8: minimal responses; lying in bed eyes closed while awake; smiles inidcates he is okay but endorses SI and AH vaguely;.   in bed little eye contact; minimal speech; internally preoccupied; inapprpriate affect; staff reports refusal to allow routine procedures such as vital signs; however compliant;.  continue Risperal titration and encourage socialization and compliant with routine procedures.   bp wnl; raising Risperdal to 1.5mg po bid.   ;;3/11: mood improving slightly still endorses AH and SI but spends more time watching TV;  not conversational;.  in bed little eye contact; minimal speech; internally preoccupied; inapprpriate affect; staff reports refusal to allow routine procedures such as vital signs; however compliant;.  continue Risperal titration and encourage socialization and compliant with routine procedures.   bp wnl; raising Risperdal to 2 mg po bid.    ;;3/12: wants to stay three weeks and leave before he gets his check on first of month; discussed substance abuse/depence issues; states voices and SI are decreasing;  not verbal ; monosyllabic replies;.   in bed little eye contact; minimal speech; internally preoccupied; inapprpriate affect.  does smile strangely more often ;.  continue Risperdal titration for mood and psychotic sxs AH.   ;;3/13: no comments when questions asked; smiles strangely;.  acknowledges some SI and AH but otherwise very quiet and guarded looking at the writer and smiling on occasion; however more visible; little speech; fair ot poor adls;  ACT team to visit today;.  continues to make some progress with less social but considerable emotional  withdrawal.  internally preoccupied and likely still psychotic; raising Risperdal to 2mg po bid.

## 2019-03-14 PROCEDURE — 99232 SBSQ HOSP IP/OBS MODERATE 35: CPT

## 2019-03-14 RX ADMIN — Medication 1 PATCH: at 19:40

## 2019-03-14 RX ADMIN — Medication 1 PATCH: at 09:59

## 2019-03-14 RX ADMIN — Medication 25 MILLIGRAM(S): at 21:27

## 2019-03-14 RX ADMIN — RISPERIDONE 2 MILLIGRAM(S): 4 TABLET ORAL at 09:59

## 2019-03-14 RX ADMIN — RISPERIDONE 2 MILLIGRAM(S): 4 TABLET ORAL at 21:27

## 2019-03-14 NOTE — PROGRESS NOTE BEHAVIORAL HEALTH - NSBHFUPINTERVALCCFT_PSY_A_CORE
met with ACT team and when asked when they discused he replied "Nothing" however report to staff is that interview went well and that patient would accept referral for rehab; no complaints to writer; JENNIFER barker; no SI. met with ACT team and when asked when they discussed he replied "Nothing" however report to staff is that interview went well and that patient would accept referral for rehab; no complaints to writer; JENNIFER estrada; no SI.

## 2019-03-14 NOTE — PROGRESS NOTE BEHAVIORAL HEALTH - NSBHFUPINTERVALHXFT_PSY_A_CORE
lying on side in bed; avoiding eye contact; smiles on occasion; internally preoccupied; very quiet and guarded; however more visible as per staff; little speech; fair ot poor adls

## 2019-03-14 NOTE — PROGRESS NOTE BEHAVIORAL HEALTH - SUMMARY
54yo  M, PPHx of cocaine, heroin, EtOH, and cannabis use disorders and self reported Bipolar most recent episode depression, who was BIB self due to SI to jump in front of train in context of cocaine and alcohol relapse, similar to recent psychiatric admission in Nov 2018.  Pt. admits to relapse on crack/cocaine and EtOH earlier today.  He is not clear as to why he was not able to follow up with outpatient providers.  He states he feels safe in the hospital right now, but should he return to the streets in his current state he fears for his life as his depression is so severe he will end his life.  He states he is currently hearing voices telling him to "jump in front of a train." He denies VH. He denies HI.   He reports his mood as "depressed" and endorses difficulty with appetite, sleep, energy, and concentration.  Discussed with pt was the use of ZAMORA given his hx of poor compliance and pt stated he would accept an injection prior to his discharge.  Pt. reports to have been drinking earlier today, he denies any previous episodes of withdrawal seizure and states he has only experienced "shakes" in the past.    ;;3/6: avoidant; under the covers; smiles strangely much like preivous admissions; in past  has had AH as above; resuming Risperdal titratration with likely transition to ZAMORA.  Review community support and may need enhanced community support in view of frequent readmissions.      ;;3/7: stays in bed; minimal responses; indicates he has AH and SI but mostly smiles silently;.  in bed little eye contact; minimal speech; internally preoccupied; inapprpriate affect; staff reports refusal to allow routine procedures such as vital signs; however compliant;.  continue Risperal titration and encourage socialization and compliant with routine procedures.   ;;3/8: minimal responses; lying in bed eyes closed while awake; smiles inidcates he is okay but endorses SI and AH vaguely;.   in bed little eye contact; minimal speech; internally preoccupied; inapprpriate affect; staff reports refusal to allow routine procedures such as vital signs; however compliant;.  continue Risperal titration and encourage socialization and compliant with routine procedures.   bp wnl; raising Risperdal to 1.5mg po bid.   ;;3/11: mood improving slightly still endorses AH and SI but spends more time watching TV;  not conversational;.  in bed little eye contact; minimal speech; internally preoccupied; inapprpriate affect; staff reports refusal to allow routine procedures such as vital signs; however compliant;.  continue Risperal titration and encourage socialization and compliant with routine procedures.   bp wnl; raising Risperdal to 2 mg po bid.    ;;3/12: wants to stay three weeks and leave before he gets his check on first of month; discussed substance abuse/depence issues; states voices and SI are decreasing;  not verbal ; monosyllabic replies;.   in bed little eye contact; minimal speech; internally preoccupied; inapprpriate affect.  does smile strangely more often ;.  continue Risperdal titration for mood and psychotic sxs AH.   ;;3/13: no comments when questions asked; smiles strangely;.  acknowledges some SI and AH but otherwise very quiet and guarded looking at the writer and smiling on occasion; however more visible; little speech; fair ot poor adls;  ACT team to visit today;.  continues to make some progress with less social but considerable emotional  withdrawal.  internally preoccupied and likely still psychotic; raising Risperdal to 2mg po bid.    ;;3/14: met with ACT team and when asked when they discused he replied "Nothing" however report to staff is that interview went well and that patient would accept referral for rehab; no complaints to writer; AH diminsihing; no SI.  ;.   lying on side in bed; avoiding eye contact; smiles on occasion; internally preoccupied; very quiet and guarded; however more visible as per staff; little speech; fair ot poor adls;.   continue Risperdal raised to 4mg TDD for psychosis  ; f/u of rehab request. 54yo  M, PPHx of cocaine, heroin, EtOH, and cannabis use disorders and self reported Bipolar most recent episode depression, who was BIB self due to SI to jump in front of train in context of cocaine and alcohol relapse, similar to recent psychiatric admission in Nov 2018.  Pt. admits to relapse on crack/cocaine and EtOH earlier today.  He is not clear as to why he was not able to follow up with outpatient providers.  He states he feels safe in the hospital right now, but should he return to the streets in his current state he fears for his life as his depression is so severe he will end his life.  He states he is currently hearing voices telling him to "jump in front of a train." He denies VH. He denies HI.   He reports his mood as "depressed" and endorses difficulty with appetite, sleep, energy, and concentration.  Discussed with pt was the use of ZAMORA given his hx of poor compliance and pt stated he would accept an injection prior to his discharge.  Pt. reports to have been drinking earlier today, he denies any previous episodes of withdrawal seizure and states he has only experienced "shakes" in the past.    ;;3/6: avoidant; under the covers; smiles strangely much like preivous admissions; in past  has had AH as above; resuming Risperdal titratration with likely transition to ZAMORA.  Review community support and may need enhanced community support in view of frequent readmissions.      ;;3/7: stays in bed; minimal responses; indicates he has AH and SI but mostly smiles silently;.  in bed little eye contact; minimal speech; internally preoccupied; inapprpriate affect; staff reports refusal to allow routine procedures such as vital signs; however compliant;.  continue Risperal titration and encourage socialization and compliant with routine procedures.   ;;3/8: minimal responses; lying in bed eyes closed while awake; smiles inidcates he is okay but endorses SI and AH vaguely;.   in bed little eye contact; minimal speech; internally preoccupied; inapprpriate affect; staff reports refusal to allow routine procedures such as vital signs; however compliant;.  continue Risperal titration and encourage socialization and compliant with routine procedures.   bp wnl; raising Risperdal to 1.5mg po bid.   ;;3/11: mood improving slightly still endorses AH and SI but spends more time watching TV;  not conversational;.  in bed little eye contact; minimal speech; internally preoccupied; inapprpriate affect; staff reports refusal to allow routine procedures such as vital signs; however compliant;.  continue Risperal titration and encourage socialization and compliant with routine procedures.   bp wnl; raising Risperdal to 2 mg po bid.    ;;3/12: wants to stay three weeks and leave before he gets his check on first of month; discussed substance abuse/depence issues; states voices and SI are decreasing;  not verbal ; monosyllabic replies;.   in bed little eye contact; minimal speech; internally preoccupied; inapprpriate affect.  does smile strangely more often ;.  continue Risperdal titration for mood and psychotic sxs AH.   ;;3/13: no comments when questions asked; smiles strangely;.  acknowledges some SI and AH but otherwise very quiet and guarded looking at the writer and smiling on occasion; however more visible; little speech; fair ot poor adls;  ACT team to visit today;.  continues to make some progress with less social but considerable emotional  withdrawal.  internally preoccupied and likely still psychotic; raising Risperdal to 2mg po bid.    ;;3/14: met with ACT team and when asked when they discussed he replied "Nothing" however report to staff is that interview went well and that patient would accept referral for rehab; no complaints to writer; AH diminishing; no SI.  ;.   lying on side in bed; avoiding eye contact; smiles on occasion; internally preoccupied; very quiet and guarded; however more visible as per staff; little speech; fair ot poor adls;.   continue Risperdal raised to 4mg TDD for psychosis  ; f/u of rehab request.

## 2019-03-15 PROCEDURE — 99232 SBSQ HOSP IP/OBS MODERATE 35: CPT

## 2019-03-15 RX ADMIN — Medication 1 PATCH: at 10:15

## 2019-03-15 RX ADMIN — Medication 1 PATCH: at 06:47

## 2019-03-15 RX ADMIN — RISPERIDONE 2 MILLIGRAM(S): 4 TABLET ORAL at 10:15

## 2019-03-15 RX ADMIN — Medication 25 MILLIGRAM(S): at 21:51

## 2019-03-15 RX ADMIN — Medication 1 PATCH: at 21:52

## 2019-03-15 RX ADMIN — Medication 1 PATCH: at 10:17

## 2019-03-15 RX ADMIN — RISPERIDONE 2 MILLIGRAM(S): 4 TABLET ORAL at 21:51

## 2019-03-15 NOTE — PROGRESS NOTE BEHAVIORAL HEALTH - SUMMARY
52yo  M, PPHx of cocaine, heroin, EtOH, and cannabis use disorders and self reported Bipolar most recent episode depression, who was BIB self due to SI to jump in front of train in context of cocaine and alcohol relapse, similar to recent psychiatric admission in Nov 2018.  Pt. admits to relapse on crack/cocaine and EtOH earlier today.  He is not clear as to why he was not able to follow up with outpatient providers.  He states he feels safe in the hospital right now, but should he return to the streets in his current state he fears for his life as his depression is so severe he will end his life.  He states he is currently hearing voices telling him to "jump in front of a train." He denies VH. He denies HI.   He reports his mood as "depressed" and endorses difficulty with appetite, sleep, energy, and concentration.  Discussed with pt was the use of ZAMORA given his hx of poor compliance and pt stated he would accept an injection prior to his discharge.  Pt. reports to have been drinking earlier today, he denies any previous episodes of withdrawal seizure and states he has only experienced "shakes" in the past.    ;;3/6: avoidant; under the covers; smiles strangely much like preivous admissions; in past  has had AH as above; resuming Risperdal titratration with likely transition to ZAMORA.  Review community support and may need enhanced community support in view of frequent readmissions.      ;;3/7: stays in bed; minimal responses; indicates he has AH and SI but mostly smiles silently;.  in bed little eye contact; minimal speech; internally preoccupied; inapprpriate affect; staff reports refusal to allow routine procedures such as vital signs; however compliant;.  continue Risperal titration and encourage socialization and compliant with routine procedures.   ;;3/8: minimal responses; lying in bed eyes closed while awake; smiles inidcates he is okay but endorses SI and AH vaguely;.   in bed little eye contact; minimal speech; internally preoccupied; inapprpriate affect; staff reports refusal to allow routine procedures such as vital signs; however compliant;.  continue Risperal titration and encourage socialization and compliant with routine procedures.   bp wnl; raising Risperdal to 1.5mg po bid.   ;;3/11: mood improving slightly still endorses AH and SI but spends more time watching TV;  not conversational;.  in bed little eye contact; minimal speech; internally preoccupied; inapprpriate affect; staff reports refusal to allow routine procedures such as vital signs; however compliant;.  continue Risperal titration and encourage socialization and compliant with routine procedures.   bp wnl; raising Risperdal to 2 mg po bid.    ;;3/12: wants to stay three weeks and leave before he gets his check on first of month; discussed substance abuse/depence issues; states voices and SI are decreasing;  not verbal ; monosyllabic replies;.   in bed little eye contact; minimal speech; internally preoccupied; inapprpriate affect.  does smile strangely more often ;.  continue Risperdal titration for mood and psychotic sxs AH.   ;;3/13: no comments when questions asked; smiles strangely;.  acknowledges some SI and AH but otherwise very quiet and guarded looking at the writer and smiling on occasion; however more visible; little speech; fair ot poor adls;  ACT team to visit today;.  continues to make some progress with less social but considerable emotional  withdrawal.  internally preoccupied and likely still psychotic; raising Risperdal to 2mg po bid.    ;;3/14: met with ACT team and when asked when they discused he replied "Nothing" however report to staff is that interview went well and that patient would accept referral for rehab; no complaints to writer; AH diminsihing; no SI.  ;.   lying on side in bed; avoiding eye contact; smiles on occasion; internally preoccupied; very quiet and guarded; however more visible as per staff; little speech; fair ot poor adls;.   continue Risperdal raised to 4mg TDD for psychosis  ; f/u of rehab request.    ;;3/15: give the writer the thumbs up; wants rehab;  smiles and says little else;.  sitting in the am by the TV smiling; says little; no SI but some AH;  fair adls; constricted limited affect ; oriented;.  continue Risperdal for psychosis ; awaiting application for rehab; encourage socialization and groups but patient is usually avoidant.

## 2019-03-15 NOTE — PROGRESS NOTE BEHAVIORAL HEALTH - NSBHFUPINTERVALHXFT_PSY_A_CORE
sitting in the am by the TV smiling; says little; no SI but some AH;  fair adls; constricted limited affect ; oriented

## 2019-03-16 RX ADMIN — RISPERIDONE 2 MILLIGRAM(S): 4 TABLET ORAL at 09:52

## 2019-03-16 RX ADMIN — RISPERIDONE 2 MILLIGRAM(S): 4 TABLET ORAL at 21:56

## 2019-03-16 RX ADMIN — Medication 25 MILLIGRAM(S): at 21:55

## 2019-03-16 RX ADMIN — Medication 1 PATCH: at 21:58

## 2019-03-16 RX ADMIN — Medication 1 PATCH: at 07:03

## 2019-03-16 RX ADMIN — Medication 1 PATCH: at 09:52

## 2019-03-17 RX ADMIN — Medication 1 PATCH: at 06:54

## 2019-03-17 RX ADMIN — RISPERIDONE 2 MILLIGRAM(S): 4 TABLET ORAL at 21:38

## 2019-03-17 RX ADMIN — Medication 1 PATCH: at 09:33

## 2019-03-17 RX ADMIN — RISPERIDONE 2 MILLIGRAM(S): 4 TABLET ORAL at 09:34

## 2019-03-17 RX ADMIN — Medication 25 MILLIGRAM(S): at 21:38

## 2019-03-18 PROCEDURE — 99232 SBSQ HOSP IP/OBS MODERATE 35: CPT

## 2019-03-18 RX ORDER — RISPERIDONE 4 MG/1
4 TABLET ORAL AT BEDTIME
Qty: 0 | Refills: 0 | Status: DISCONTINUED | OUTPATIENT
Start: 2019-03-18 | End: 2019-04-05

## 2019-03-18 RX ADMIN — Medication 1 PATCH: at 18:50

## 2019-03-18 RX ADMIN — RISPERIDONE 4 MILLIGRAM(S): 4 TABLET ORAL at 21:19

## 2019-03-18 RX ADMIN — Medication 1 PATCH: at 10:46

## 2019-03-18 RX ADMIN — RISPERIDONE 2 MILLIGRAM(S): 4 TABLET ORAL at 10:46

## 2019-03-18 RX ADMIN — Medication 25 MILLIGRAM(S): at 21:20

## 2019-03-18 RX ADMIN — Medication 1 PATCH: at 10:50

## 2019-03-18 RX ADMIN — Medication 1 PATCH: at 06:42

## 2019-03-18 NOTE — PROGRESS NOTE BEHAVIORAL HEALTH - SUMMARY
52yo  M, PPHx of cocaine, heroin, EtOH, and cannabis use disorders and self reported Bipolar most recent episode depression, who was BIB self due to SI to jump in front of train in context of cocaine and alcohol relapse, similar to recent psychiatric admission in Nov 2018.  Pt. admits to relapse on crack/cocaine and EtOH earlier today.  He is not clear as to why he was not able to follow up with outpatient providers.  He states he feels safe in the hospital right now, but should he return to the streets in his current state he fears for his life as his depression is so severe he will end his life.  He states he is currently hearing voices telling him to "jump in front of a train." He denies VH. He denies HI.   He reports his mood as "depressed" and endorses difficulty with appetite, sleep, energy, and concentration.  Discussed with pt was the use of ZAMORA given his hx of poor compliance and pt stated he would accept an injection prior to his discharge.  Pt. reports to have been drinking earlier today, he denies any previous episodes of withdrawal seizure and states he has only experienced "shakes" in the past.    ;;3/6: avoidant; under the covers; smiles strangely much like preivous admissions; in past  has had AH as above; resuming Risperdal titratration with likely transition to ZAMORA.  Review community support and may need enhanced community support in view of frequent readmissions.      ;;3/7: stays in bed; minimal responses; indicates he has AH and SI but mostly smiles silently;.  in bed little eye contact; minimal speech; internally preoccupied; inapprpriate affect; staff reports refusal to allow routine procedures such as vital signs; however compliant;.  continue Risperal titration and encourage socialization and compliant with routine procedures.   ;;3/8: minimal responses; lying in bed eyes closed while awake; smiles inidcates he is okay but endorses SI and AH vaguely;.   in bed little eye contact; minimal speech; internally preoccupied; inapprpriate affect; staff reports refusal to allow routine procedures such as vital signs; however compliant;.  continue Risperal titration and encourage socialization and compliant with routine procedures.   bp wnl; raising Risperdal to 1.5mg po bid.   ;;3/11: mood improving slightly still endorses AH and SI but spends more time watching TV;  not conversational;.  in bed little eye contact; minimal speech; internally preoccupied; inapprpriate affect; staff reports refusal to allow routine procedures such as vital signs; however compliant;.  continue Risperal titration and encourage socialization and compliant with routine procedures.   bp wnl; raising Risperdal to 2 mg po bid.    ;;3/12: wants to stay three weeks and leave before he gets his check on first of month; discussed substance abuse/depence issues; states voices and SI are decreasing;  not verbal ; monosyllabic replies;.   in bed little eye contact; minimal speech; internally preoccupied; inapprpriate affect.  does smile strangely more often ;.  continue Risperdal titration for mood and psychotic sxs AH.   ;;3/13: no comments when questions asked; smiles strangely;.  acknowledges some SI and AH but otherwise very quiet and guarded looking at the writer and smiling on occasion; however more visible; little speech; fair ot poor adls;  ACT team to visit today;.  continues to make some progress with less social but considerable emotional  withdrawal.  internally preoccupied and likely still psychotic; raising Risperdal to 2mg po bid.    ;;3/14: met with ACT team and when asked when they discused he replied "Nothing" however report to staff is that interview went well and that patient would accept referral for rehab; no complaints to writer; AH diminsihing; no SI.  ;.   lying on side in bed; avoiding eye contact; smiles on occasion; internally preoccupied; very quiet and guarded; however more visible as per staff; little speech; fair ot poor adls;.   continue Risperdal raised to 4mg TDD for psychosis  ; f/u of rehab request.    ;;3/15: give the writer the thumbs up; wants rehab;  smiles and says little else;.  sitting in the am by the TV smiling; says little; no SI but some AH;  fair adls; constricted limited affect ; oriented;.  continue Risperdal for psychosis ; awaiting application for rehab; encourage socialization and groups but patient is usually avoidant.   ;;3/18: not conversational; limited replies to questions; wants meds at night; continues to want rehab;.  lying in bed;  smiles and says little else  ; no SI but some AH;  fair adls; constricted limited affect ; oriented;.  continue Risperdal for psychosis but move all 4mg to nighttime  ; awaiting application for rehab; encourage socialization and groups but patient is usually avoidant. 54yo  M, PPHx of cocaine, heroin, EtOH, and cannabis use disorders and self reported Bipolar most recent episode depression, who was BIB self due to SI to jump in front of train in context of cocaine and alcohol relapse, similar to recent psychiatric admission in Nov 2018.  Pt. admits to relapse on crack/cocaine and EtOH earlier today.  He is not clear as to why he was not able to follow up with outpatient providers.  He states he feels safe in the hospital right now, but should he return to the streets in his current state he fears for his life as his depression is so severe he will end his life.  He states he is currently hearing voices telling him to "jump in front of a train." He denies VH. He denies HI.   He reports his mood as "depressed" and endorses difficulty with appetite, sleep, energy, and concentration.  Discussed with pt was the use of ZAMORA given his hx of poor compliance and pt stated he would accept an injection prior to his discharge.  Pt. reports to have been drinking earlier today, he denies any previous episodes of withdrawal seizure and states he has only experienced "shakes" in the past.    ;;3/6: avoidant; under the covers; smiles strangely much like preivous admissions; in past  has had AH as above; resuming Risperdal titratration with likely transition to ZAMORA.  Review community support and may need enhanced community support in view of frequent readmissions.      ;;3/7: stays in bed; minimal responses; indicates he has AH and SI but mostly smiles silently;.  in bed little eye contact; minimal speech; internally preoccupied; inapprpriate affect; staff reports refusal to allow routine procedures such as vital signs; however compliant;.  continue Risperal titration and encourage socialization and compliant with routine procedures.   ;;3/8: minimal responses; lying in bed eyes closed while awake; smiles inidcates he is okay but endorses SI and AH vaguely;.   in bed little eye contact; minimal speech; internally preoccupied; inapprpriate affect; staff reports refusal to allow routine procedures such as vital signs; however compliant;.  continue Risperal titration and encourage socialization and compliant with routine procedures.   bp wnl; raising Risperdal to 1.5mg po bid.   ;;3/11: mood improving slightly still endorses AH and SI but spends more time watching TV;  not conversational;.  in bed little eye contact; minimal speech; internally preoccupied; inapprpriate affect; staff reports refusal to allow routine procedures such as vital signs; however compliant;.  continue Risperal titration and encourage socialization and compliant with routine procedures.   bp wnl; raising Risperdal to 2 mg po bid.    ;;3/12: wants to stay three weeks and leave before he gets his check on first of month; discussed substance abuse/depence issues; states voices and SI are decreasing;  not verbal ; monosyllabic replies;.   in bed little eye contact; minimal speech; internally preoccupied; inapprpriate affect.  does smile strangely more often ;.  continue Risperdal titration for mood and psychotic sxs AH.   ;;3/13: no comments when questions asked; smiles strangely;.  acknowledges some SI and AH but otherwise very quiet and guarded looking at the writer and smiling on occasion; however more visible; little speech; fair ot poor adls;  ACT team to visit today;.  continues to make some progress with less social but considerable emotional  withdrawal.  internally preoccupied and likely still psychotic; raising Risperdal to 2mg po bid.    ;;3/14: met with ACT team and when asked when they discused he replied "Nothing" however report to staff is that interview went well and that patient would accept referral for rehab; no complaints to writer; AH diminsihing; no SI.  ;.   lying on side in bed; avoiding eye contact; smiles on occasion; internally preoccupied; very quiet and guarded; however more visible as per staff; little speech; fair ot poor adls;.   continue Risperdal raised to 4mg TDD for psychosis  ; f/u of rehab request.    ;;3/15: give the writer the thumbs up; wants rehab;  smiles and says little else;.  sitting in the am by the TV smiling; says little; no SI but some AH;  fair adls; constricted limited affect ; oriented;.  continue Risperdal for psychosis ; awaiting application for rehab; encourage socialization and groups but patient is usually avoidant.   ;;3/18: not conversational; limited replies to questions; wants meds at night; continues to want rehab;.  lying in bed;  smiles and says little else  ; no SI but some AH;  fair adls; constricted limited affect ; oriented;.  continue Risperdal for psychosis but move all 4mg to nighttime  ; awaiting application for rehab; encourage socialization and groups but patient is usually avoidant.  bp elevated (122/83 - 155/91) to monitor and consult medicine.

## 2019-03-18 NOTE — PROGRESS NOTE BEHAVIORAL HEALTH - NSBHFUPINTERVALHXFT_PSY_A_CORE
lying in bed;  smiles and says little else  ; no SI but some AH;  fair adls; constricted limited affect ; oriented

## 2019-03-19 PROCEDURE — 99232 SBSQ HOSP IP/OBS MODERATE 35: CPT

## 2019-03-19 RX ADMIN — Medication 1 PATCH: at 09:43

## 2019-03-19 RX ADMIN — Medication 1 PATCH: at 07:51

## 2019-03-19 RX ADMIN — Medication 1 PATCH: at 21:43

## 2019-03-19 RX ADMIN — RISPERIDONE 4 MILLIGRAM(S): 4 TABLET ORAL at 21:42

## 2019-03-19 RX ADMIN — Medication 25 MILLIGRAM(S): at 21:42

## 2019-03-19 RX ADMIN — Medication 1 PATCH: at 09:00

## 2019-03-19 NOTE — PROGRESS NOTE BEHAVIORAL HEALTH - SUMMARY
52yo  M, PPHx of cocaine, heroin, EtOH, and cannabis use disorders and self reported Bipolar most recent episode depression, who was BIB self due to SI to jump in front of train in context of cocaine and alcohol relapse, similar to recent psychiatric admission in Nov 2018.  Pt. admits to relapse on crack/cocaine and EtOH earlier today.  He is not clear as to why he was not able to follow up with outpatient providers.  He states he feels safe in the hospital right now, but should he return to the streets in his current state he fears for his life as his depression is so severe he will end his life.  He states he is currently hearing voices telling him to "jump in front of a train." He denies VH. He denies HI.   He reports his mood as "depressed" and endorses difficulty with appetite, sleep, energy, and concentration.  Discussed with pt was the use of ZAMORA given his hx of poor compliance and pt stated he would accept an injection prior to his discharge.  Pt. reports to have been drinking earlier today, he denies any previous episodes of withdrawal seizure and states he has only experienced "shakes" in the past.    ;;3/6: avoidant; under the covers; smiles strangely much like preivous admissions; in past  has had AH as above; resuming Risperdal titratration with likely transition to ZAMORA.  Review community support and may need enhanced community support in view of frequent readmissions.      ;;3/7: stays in bed; minimal responses; indicates he has AH and SI but mostly smiles silently;.  in bed little eye contact; minimal speech; internally preoccupied; inapprpriate affect; staff reports refusal to allow routine procedures such as vital signs; however compliant;.  continue Risperal titration and encourage socialization and compliant with routine procedures.   ;;3/8: minimal responses; lying in bed eyes closed while awake; smiles inidcates he is okay but endorses SI and AH vaguely;.   in bed little eye contact; minimal speech; internally preoccupied; inapprpriate a...ng in the am by the TV smiling; says little; no SI but some AH;  fair adls; constricted limited affect ; oriented;.  continue Risperdal for psychosis ; awaiting application for rehab; encourage socialization and groups but patient is usually avoidant.   ;;3/18: not conversational; limited replies to questions; wants meds at night; continues to want rehab;.  lying in bed;  smiles and says little else  ; no SI but some AH;  fair adls; constricted limited affect ; oriented;.  continue Risperdal for psychosis but move all 4mg to nighttime  ; awaiting application for rehab; encourage socialization and groups but patient is usually avoidant.   ;;3/19: much like yesterday; no complaints; not conversational.   ;.  lying in bed;  smiles and says little else  ; no SI but some AH;  fair adls; constricted limited affect ; oriented;.  continue Risperdal for psychosis but move all 4mg to nighttime  ; awaiting application for rehab; encourage socialization and groups but patient is usually avoidant.

## 2019-03-20 PROCEDURE — 99232 SBSQ HOSP IP/OBS MODERATE 35: CPT

## 2019-03-20 RX ADMIN — RISPERIDONE 4 MILLIGRAM(S): 4 TABLET ORAL at 21:23

## 2019-03-20 RX ADMIN — Medication 1 PATCH: at 10:49

## 2019-03-20 RX ADMIN — Medication 1 PATCH: at 20:06

## 2019-03-20 RX ADMIN — Medication 1 PATCH: at 14:15

## 2019-03-20 RX ADMIN — Medication 1 PATCH: at 06:50

## 2019-03-20 RX ADMIN — Medication 25 MILLIGRAM(S): at 21:23

## 2019-03-20 NOTE — PROGRESS NOTE BEHAVIORAL HEALTH - NSBHFUPINTERVALCCFT_PSY_A_CORE
complains that Klonopin not given at time she desires;  otherwise only general worry states AH and SI are gone; anticipating going to rehab; no complaints

## 2019-03-20 NOTE — PROGRESS NOTE BEHAVIORAL HEALTH - SUMMARY
37 year old F with complicated past psychiatric history (differential diagnoses include MARTIN, MDD with psychotic fx, PTSD, Borderline Personality D/O), history of multiple psychiatric admissions including [facility] [address] in December 2018 and [facility] 1/7/19-2/6/19, who currently presents to [facility] ED c/o auditory hallucinations with suicidal ideation. She states the voices are telling her to “kill myself, or sacrifice myself or someone else to make me better”. The pt denies any history of homicidal ideation, although she does have a history of suicidal ideation for which she was hospitalized. The pt reports a current plan of overdosing on the bisoprolol and muscle relaxant she has at home. The pt reports she was recently discharged from [facility], where she was hospitalized from 1/7/2019 to 2/6/2019, which she states ‘did nothing for me’. She reports she was started on Abilify and buspirone while at [facility], which she feels did not help her. She has had a previous admission here at [facility] inpatient psych unit from 12/6/18 – 12/31/18 under Dr. De La Torre, where she was started on Klonopin and Risperdal. The pt reports she was not able to follow up with outpatient psychiatry because she became sick from [place] and could not make it to her appointment; she did not reschedule. The pt states she feels the Klonopin and Risperdal were helpful for her sx and would like to be back on that regimen. The pt denies any recent use of tobacco, alcohol, or illicit drugs."    ;;3/18: states that her thoughts bother her and may be telling her to hurt self; very somatically preoccupied; accepting dose increase of Haldol to 7.5mg at night for delusional thinking with Klonopin 1mg po bid for anxiety; attempting to find appropraite f/u after attempt to send to [facility] was unsuccessful with transportation being an issue.    ;;3/19: able to discuss difficulties with past aftercare referral with transportation and timely arrival at appointment.  No somatic complaints today.  Discussed raising Haldol to 10mg at night;.  mood improved; no somatic mention; asked about diagnosis and able to discuss treatment issues without any lability or agitation;  good eye contact; no tremor; lying in bed; good adls; anxious but thought congruent; ij: improving.  ;.  raise Haldol to 10mg at night.  Continue effort to arrange aftercare that may be more pracitcal for the patient.    ;;3/20: complains that Klonopin not given at time she desires;  otherwise only general worry;.   mood improved; no somatic mention; asked about diagnosis and able to discuss treatment issues without any lability or agitation;  good eye contact; no tremor; lying in bed; good adls; anxious but thought congruent; ij: improving. ;.  continue Haldol at 10mg po at night for disorganization; continue Klonoopin at 1mg po 9am and 9pm for anxiety. 52yo  M, PPHx of cocaine, heroin, EtOH, and cannabis use disorders and self reported Bipolar most recent episode depression, who was BIB self due to SI to jump in front of train in context of cocaine and alcohol relapse, similar to recent psychiatric admission in Nov 2018.  Pt. admits to relapse on crack/cocaine and EtOH earlier today.  He is not clear as to why he was not able to follow up with outpatient providers.  He states he feels safe in the hospital right now, but should he return to the streets in his current state he fears for his life as his depression is so severe he will end his life.  He states he is currently hearing voices telling him to "jump in front of a train." He denies VH. He denies HI.   He reports his mood as "depressed" and endorses difficulty with appetite, sleep, energy, and concentration.  Discussed with pt was the use of ZAMORA given his hx of poor compliance and pt stated he would accept an injection prior to his discharge.  Pt. reports to have been drinking earlier today, he denies any previous episodes of withdrawal seizure and states he has only experienced "shakes" in the past.    ;;3/6: avoidant; under the covers; smiles strangely much like preivous admissions; in past  has had AH as above; resuming Risperdal titratration with likely transition to ZAMORA.  Review community support and may need enhanced community support in view of frequent readmissions.      ;;3/7: stays in bed; minimal responses; indicates he has AH and SI but mostly smiles silently;.  in bed little eye contact; minimal speech; internally preoccupied; inapprpriate affect; staff reports refusal to allow routine procedures such as vital signs; however compliant;.  continue Risperal titration and encourage socialization and compliant with routine procedures.   ;;3/8: minimal responses; lying in bed eyes closed while awake; smiles inidcates he is okay but endorses SI and AH vaguely;.   in bed little eye contact; minimal speech; internally preoccupied; inapprpriate a...ng in the am by the TV smiling; says little; no SI but some AH;  fair adls; constricted limited affect ; oriented;.  continue Risperdal for psychosis ; awaiting application for rehab; encourage socialization and groups but patient is usually avoidant.   ;;3/18: not conversational; limited replies to questions; wants meds at night; continues to want rehab;.  lying in bed;  smiles and says little else  ; no SI but some AH;  fair adls; constricted limited affect ; oriented;.  continue Risperdal for psychosis but move all 4mg to nighttime  ; awaiting application for rehab; encourage socialization and groups but patient is usually avoidant.   ;;3/19: much like yesterday; no complaints; not conversational.   ;.  lying in bed;  smiles and says little else  ; no SI but some AH;  fair adls; constricted limited affect ; oriented;.  continue Risperdal for psychosis but move all 4mg to nighttime  ; awaiting application for rehab; encourage socialization and groups but patient is usually avoidant.   ;;3/20: states AH and SI are gone; anticipating going to rehab; no complaints;.  lying in bed;  smiles and says little else  ; no SI and now denies any  AH; however internally preoccupied but smiling;    fair adls; constricted limited affect ; oriented;.  continue Risperdal for psychosis but move all 4mg to nighttime  ; awaiting application for rehab; encourage socialization and groups but patient is usually avoidant.  repeating labs as patient has pallor.

## 2019-03-20 NOTE — PROGRESS NOTE BEHAVIORAL HEALTH - NSBHFUPINTERVALHXFT_PSY_A_CORE
mood improved; no somatic mention; asked about diagnosis and able to discuss treatment issues without any lability or agitation;  good eye contact; no tremor; lying in bed; good adls; anxious but thought congruent; ij: improving. lying in bed;  smiles and says little else  ; no SI and now denies any  AH; however internally preoccupied but smiling;    fair adls; constricted limited affect ; oriented

## 2019-03-21 LAB
ALBUMIN SERPL ELPH-MCNC: 3.8 G/DL — SIGNIFICANT CHANGE UP (ref 3.3–5)
ALP SERPL-CCNC: 99 U/L — SIGNIFICANT CHANGE UP (ref 40–120)
ALT FLD-CCNC: 9 U/L — LOW (ref 10–45)
ANION GAP SERPL CALC-SCNC: 9 MMOL/L — SIGNIFICANT CHANGE UP (ref 5–17)
AST SERPL-CCNC: 11 U/L — SIGNIFICANT CHANGE UP (ref 10–40)
BASOPHILS # BLD AUTO: 0.03 K/UL — SIGNIFICANT CHANGE UP (ref 0–0.2)
BASOPHILS NFR BLD AUTO: 0.4 % — SIGNIFICANT CHANGE UP (ref 0–2)
BILIRUB SERPL-MCNC: 0.2 MG/DL — SIGNIFICANT CHANGE UP (ref 0.2–1.2)
BUN SERPL-MCNC: 15 MG/DL — SIGNIFICANT CHANGE UP (ref 7–23)
CALCIUM SERPL-MCNC: 9.3 MG/DL — SIGNIFICANT CHANGE UP (ref 8.4–10.5)
CHLORIDE SERPL-SCNC: 106 MMOL/L — SIGNIFICANT CHANGE UP (ref 96–108)
CO2 SERPL-SCNC: 23 MMOL/L — SIGNIFICANT CHANGE UP (ref 22–31)
CREAT SERPL-MCNC: 0.86 MG/DL — SIGNIFICANT CHANGE UP (ref 0.5–1.3)
EOSINOPHIL # BLD AUTO: 0.24 K/UL — SIGNIFICANT CHANGE UP (ref 0–0.5)
EOSINOPHIL NFR BLD AUTO: 3.4 % — SIGNIFICANT CHANGE UP (ref 0–6)
GLUCOSE SERPL-MCNC: 93 MG/DL — SIGNIFICANT CHANGE UP (ref 70–99)
HCT VFR BLD CALC: 37.9 % — LOW (ref 39–50)
HGB BLD-MCNC: 11.4 G/DL — LOW (ref 13–17)
IMM GRANULOCYTES NFR BLD AUTO: 0.3 % — SIGNIFICANT CHANGE UP (ref 0–1.5)
LYMPHOCYTES # BLD AUTO: 2.11 K/UL — SIGNIFICANT CHANGE UP (ref 1–3.3)
LYMPHOCYTES # BLD AUTO: 29.8 % — SIGNIFICANT CHANGE UP (ref 13–44)
MCHC RBC-ENTMCNC: 23.6 PG — LOW (ref 27–34)
MCHC RBC-ENTMCNC: 30.1 GM/DL — LOW (ref 32–36)
MCV RBC AUTO: 78.5 FL — LOW (ref 80–100)
MONOCYTES # BLD AUTO: 0.67 K/UL — SIGNIFICANT CHANGE UP (ref 0–0.9)
MONOCYTES NFR BLD AUTO: 9.4 % — SIGNIFICANT CHANGE UP (ref 2–14)
NEUTROPHILS # BLD AUTO: 4.02 K/UL — SIGNIFICANT CHANGE UP (ref 1.8–7.4)
NEUTROPHILS NFR BLD AUTO: 56.7 % — SIGNIFICANT CHANGE UP (ref 43–77)
NRBC # BLD: 0 /100 WBCS — SIGNIFICANT CHANGE UP (ref 0–0)
PLATELET # BLD AUTO: 322 K/UL — SIGNIFICANT CHANGE UP (ref 150–400)
POTASSIUM SERPL-MCNC: 4.4 MMOL/L — SIGNIFICANT CHANGE UP (ref 3.5–5.3)
POTASSIUM SERPL-SCNC: 4.4 MMOL/L — SIGNIFICANT CHANGE UP (ref 3.5–5.3)
PROT SERPL-MCNC: 6.8 G/DL — SIGNIFICANT CHANGE UP (ref 6–8.3)
RBC # BLD: 4.83 M/UL — SIGNIFICANT CHANGE UP (ref 4.2–5.8)
RBC # FLD: 21.9 % — HIGH (ref 10.3–14.5)
SODIUM SERPL-SCNC: 138 MMOL/L — SIGNIFICANT CHANGE UP (ref 135–145)
WBC # BLD: 7.09 K/UL — SIGNIFICANT CHANGE UP (ref 3.8–10.5)
WBC # FLD AUTO: 7.09 K/UL — SIGNIFICANT CHANGE UP (ref 3.8–10.5)

## 2019-03-21 PROCEDURE — 99232 SBSQ HOSP IP/OBS MODERATE 35: CPT

## 2019-03-21 RX ADMIN — RISPERIDONE 4 MILLIGRAM(S): 4 TABLET ORAL at 21:43

## 2019-03-21 RX ADMIN — Medication 1 PATCH: at 10:30

## 2019-03-21 RX ADMIN — Medication 25 MILLIGRAM(S): at 21:48

## 2019-03-21 RX ADMIN — Medication 1 PATCH: at 07:43

## 2019-03-21 RX ADMIN — Medication 1 PATCH: at 19:43

## 2019-03-21 NOTE — PROGRESS NOTE BEHAVIORAL HEALTH - SUMMARY
54yo  M, PPHx of cocaine, heroin, EtOH, and cannabis use disorders and self reported Bipolar most recent episode depression, who was BIB self due to SI to jump in front of train in context of cocaine and alcohol relapse, similar to recent psychiatric admission in Nov 2018.  Pt. admits to relapse on crack/cocaine and EtOH earlier today.  He is not clear as to why he was not able to follow up with outpatient providers.  He states he feels safe in the hospital right now, but should he return to the streets in his current state he fears for his life as his depression is so severe he will end his life.  He states he is currently hearing voices telling him to "jump in front of a train." He denies VH. He denies HI.   He reports his mood as "depressed" and endorses difficulty with appetite, sleep, energy, and concentration.  Discussed with pt was the use of ZAMORA given his hx of poor compliance and pt stated he would accept an injection prior to his discharge.  Pt. reports to have been drinking earlier today, he denies any previous episodes of withdrawal seizure and states he has only experienced "shakes" in the past.    ;;3/6: avoidant; under the covers; smiles strangely much like preivous admissions; in past  has had AH as above; resuming Risperdal titratration with likely transition to ZAMORA.  Review community support and may need enhanced community support in view of frequent readmissions.      ;;3/7: stays in bed; minimal responses; indicates he has AH and SI but mostly smiles silently;.  in bed little eye contact; minimal speech; internally preoccupied; inapprpriate affect; staff reports refusal to allow routine procedures such as vital signs; however compliant;.  continue Risperal titration and encourage socialization and compliant with routine procedures.   ;;3/8: minimal responses; lying in bed eyes closed while awake; smiles inidcates he is okay but endorses SI and AH vaguely;.   in bed little eye contact; minimal speech; internally preoccupied; inapprpriate a...ng in the am by the TV smiling; says little; no SI but some AH;  fair adls; constricted limited affect ; oriented;.  continue Risperdal for psychosis ; awaiting application for rehab; encourage socialization and groups but patient is usually avoidant.   ;;3/18: not conversational; limited replies to questions; wants meds at night; continues to want rehab;.  lying in bed;  smiles and says little else  ; no SI but some AH;  fair adls; constricted limited affect ; oriented;.  continue Risperdal for psychosis but move all 4mg to nighttime  ; awaiting application for rehab; encourage socialization and groups but patient is usually avoidant.   ;;3/19: much like yesterday; no complaints; not conversational.   ;.  lying in bed;  smiles and says little else  ; no SI but some AH;  fair adls; constricted limited affect ; oriented;.  continue Risperdal for psychosis but move all 4mg to nighttime  ; awaiting application for rehab; encourage socialization and groups but patient is usually avoidant.   ;;3/20: states AH and SI are gone; anticipating going to rehab; no complaints;.  lying in bed;  smiles and says little else  ; no SI and now denies any  AH; however internally preoccupied but smiling;    fair adls; constricted limited affect ; oriented;.  continue Risperdal for psychosis but move all 4mg to nighttime  ; awaiting application for rehab; encourage socialization and groups but patient is usually avoidant.  repeating labs as patient has pallor.   ;;3/21: states AH and SI are gone; anticipating going to rehab; no complaints;.  lying in bed;  smiles and says little else  ; no SI and now denies any  AH; however internally preoccupied but smiling;    fair adls; constricted limited affect ; oriented;.  continue Risperdal for psychosis but moved all 4mg to nighttime  ; awaiting application for rehab; encourage socialization and groups but patient is usually avoidant.

## 2019-03-21 NOTE — PROGRESS NOTE BEHAVIORAL HEALTH - NSBHFUPINTERVALHXFT_PSY_A_CORE
lying in bed;  smiles and says little else  ; no SI and now denies any  AH; however internally preoccupied but smiling;    fair adls; constricted limited affect ; oriented

## 2019-03-22 PROCEDURE — 99232 SBSQ HOSP IP/OBS MODERATE 35: CPT

## 2019-03-22 RX ADMIN — Medication 25 MILLIGRAM(S): at 20:56

## 2019-03-22 RX ADMIN — Medication 1 PATCH: at 10:42

## 2019-03-22 RX ADMIN — RISPERIDONE 4 MILLIGRAM(S): 4 TABLET ORAL at 20:56

## 2019-03-22 RX ADMIN — Medication 1 PATCH: at 10:40

## 2019-03-22 NOTE — PROGRESS NOTE BEHAVIORAL HEALTH - SUMMARY
54yo  M, PPHx of cocaine, heroin, EtOH, and cannabis use disorders and self reported Bipolar most recent episode depression, who was BIB self due to SI to jump in front of train in context of cocaine and alcohol relapse, similar to recent psychiatric admission in Nov 2018.  Pt. admits to relapse on crack/cocaine and EtOH earlier today.  He is not clear as to why he was not able to follow up with outpatient providers.  He states he feels safe in the hospital right now, but should he return to the streets in his current state he fears for his life as his depression is so severe he will end his life.  He states he is currently hearing voices telling him to "jump in front of a train." He denies VH. He denies HI.   He reports his mood as "depressed" and endorses difficulty with appetite, sleep, energy, and concentration.  Discussed with pt was the use of ZAMORA given his hx of poor compliance and pt stated he would accept an injection prior to his discharge.  Pt. reports to have been drinking earlier today, he denies any previous episodes of withdrawal seizure and states he has only experienced "shakes" in the past.    ;;3/6: avoidant; under the covers; smiles strangely much like preivous admissions; in past  has had AH as above; resuming Risperdal titratration with likely transition to ZAMORA.  Review community support and may need enhanced community support in view of frequent readmissions.      ;;3/7: stays in bed; minimal responses; indicates he has AH and SI but mostly smiles silently;.  in bed little eye contact; minimal speech; internally preoccupied; inapprpriate affect; staff reports refusal to allow routine procedures such as vital signs; however compliant;.  continue Risperal titration and encourage socialization and compliant with routine procedures.   ;;3/8: minimal responses; lying in bed eyes closed while awake; smiles inidcates he is okay but endorses SI and AH vaguely;.   in bed little eye contact; minimal speech; internally preoccupied; inapprpriate a...ng in the am by the TV smiling; says little; no SI but some AH;  fair adls; constricted limited affect ; oriented;.  continue Risperdal for psychosis ; awaiting application for rehab; encourage socialization and groups but patient is usually avoidant.   ;;3/18: not conversational; limited replies to questions; wants meds at night; continues to want rehab;.  lying in bed;  smiles and says little else  ; no SI but some AH;  fair adls; constricted limited affect ; oriented;.  continue Risperdal for psychosis but move all 4mg to nighttime  ; awaiting application for rehab; encourage socialization and groups but patient is usually avoidant.   ;;3/19: much like yesterday; no complaints; not conversational.   ;.  lying in bed;  smiles and says little else  ; no SI but some AH;  fair adls; constricted limited affect ; oriented;.  continue Risperdal for psychosis but move all 4mg to nighttime  ; awaiting application for rehab; encourage socialization and groups but patient is usually avoidant.   ;;3/20: states AH and SI are gone; anticipating going to rehab; no complaints;.  lying in bed;  smiles and says little else  ; no SI and now denies any  AH; however internally preoccupied but smiling;    fair adls; constricted limited affect ; oriented;.  continue Risperdal for psychosis but move all 4mg to nighttime  ; awaiting application for rehab; encourage socialization and groups but patient is usually avoidant.  repeating labs as patient has pallor.   ;;3/21: states AH and SI are gone; anticipating going to rehab; no complaints;.  lying in bed;  smiles and says little else  ; no SI and now denies any  AH; however internally preoccupied but smiling;    fair adls; constricted limited affect ; oriented;.  continue Risperdal for psychosis but moved all 4mg to nighttime  ; awaiting application for rehab; encourage socialization and groups but patient is usually avoidant.    ;;3/22: no  AH or  SI  likes to watch crime stories on TV  ; anticipating going to rehab; no complaints;.  lying in bed; says little but   smiles  ; no SI or    AH; however internally preoccupied but smiling;    fair adls; constricted limited affect ; oriented;.  continue Risperdal for psychosis but moved all 4mg to nighttime  ; awaiting application for rehab; encourage socialization and groups but patient is usually avoidant.

## 2019-03-22 NOTE — PROGRESS NOTE BEHAVIORAL HEALTH - NSBHFUPINTERVALHXFT_PSY_A_CORE
lying in bed; says little but   smiles  ; no SI or    AH; however internally preoccupied but smiling;    fair adls; constricted limited affect ; oriented

## 2019-03-23 RX ADMIN — Medication 25 MILLIGRAM(S): at 21:20

## 2019-03-23 RX ADMIN — RISPERIDONE 4 MILLIGRAM(S): 4 TABLET ORAL at 21:20

## 2019-03-23 RX ADMIN — Medication 1 PATCH: at 07:21

## 2019-03-24 RX ADMIN — Medication 25 MILLIGRAM(S): at 20:56

## 2019-03-24 RX ADMIN — RISPERIDONE 4 MILLIGRAM(S): 4 TABLET ORAL at 20:56

## 2019-03-24 RX ADMIN — Medication 1 PATCH: at 10:06

## 2019-03-25 RX ADMIN — Medication 25 MILLIGRAM(S): at 21:33

## 2019-03-25 RX ADMIN — Medication 1 PATCH: at 07:07

## 2019-03-25 RX ADMIN — RISPERIDONE 4 MILLIGRAM(S): 4 TABLET ORAL at 21:34

## 2019-03-25 NOTE — PROGRESS NOTE BEHAVIORAL HEALTH - NSBHFUPINTERVALCCFT_PSY_A_CORE
no  AH or  SI  likes to watch crime stories on TV  ; anticipating going to rehab; no complaints "Im alright."

## 2019-03-25 NOTE — PROGRESS NOTE BEHAVIORAL HEALTH - NSBHFUPINTERVALHXFT_PSY_A_CORE
lying in bed; says little but   smiles  ; no SI or    AH; however internally preoccupied but smiling;    fair adls; constricted limited affect ; oriented pt is visible, minimally social, in good behavioral control, euthymic mood, constricted affect, fair hygiene, improved insight, internally preoccupied. attends select groups. denies SI/HI/AH/VH pt is visible, minimally social, in good behavioral control, euthymic mood, constricted affect, fair hygiene, improved insight, internally preoccupied. attends select groups. Pt seen at bedside, pt lying in bed, responds with one word, does not elaborate when asked open-ended questions; did not smile during interaction. Reports good sleep and appetite. Denies any issues. denies SI/HI/AH/VH pt is visible, isolative, in good behavioral control, euthymic mood, constricted affect, fair hygiene, improved insight, internally preoccupied. attends select groups. Pt seen at bedside, pt lying in bed, responds with one word, does not elaborate when asked open-ended questions; did not smile during interaction. Reports good sleep and appetite. Denies any issues. denies SI/HI/AH/VH

## 2019-03-25 NOTE — PROGRESS NOTE BEHAVIORAL HEALTH - SUMMARY
54yo  M, PPHx of cocaine, heroin, EtOH, and cannabis use disorders and self reported Bipolar most recent episode depression, who was BIB self due to SI to jump in front of train in context of cocaine and alcohol relapse, similar to recent psychiatric admission in Nov 2018.  Pt. admits to relapse on crack/cocaine and EtOH earlier today.  He is not clear as to why he was not able to follow up with outpatient providers.  He states he feels safe in the hospital right now, but should he return to the streets in his current state he fears for his life as his depression is so severe he will end his life.  He states he is currently hearing voices telling him to "jump in front of a train." He denies VH. He denies HI.   He reports his mood as "depressed" and endorses difficulty with appetite, sleep, energy, and concentration.  Discussed with pt was the use of ZAMORA given his hx of poor compliance and pt stated he would accept an injection prior to his discharge.  Pt. reports to have been drinking earlier today, he denies any previous episodes of withdrawal seizure and states he has only experienced "shakes" in the past.    ;;3/6: avoidant; under the covers; smiles strangely much like preivous admissions; in past  has had AH as above; resuming Risperdal titratration with likely transition to ZAMORA.  Review community support and may need enhanced community support in view of frequent readmissions.      ;;3/7: stays in bed; minimal responses; indicates he has AH and SI but mostly smiles silently;.  in bed little eye contact; minimal speech; internally preoccupied; inapprpriate affect; staff reports refusal to allow routine procedures such as vital signs; however compliant;.  continue Risperal titration and encourage socialization and compliant with routine procedures.   ;;3/8: minimal responses; lying in bed eyes closed while awake; smiles inidcates he is okay but endorses SI and AH vaguely;.   in bed little eye contact; minimal speech; internally preoccupied; inapprpriate a...ng in the am by the TV smiling; says little; no SI but some AH;  fair adls; constricted limited affect ; oriented;.  continue Risperdal for psychosis ; awaiting application for rehab; encourage socialization and groups but patient is usually avoidant.   ;;3/18: not conversational; limited replies to questions; wants meds at night; continues to want rehab;.  lying in bed;  smiles and says little else  ; no SI but some AH;  fair adls; constricted limited affect ; oriented;.  continue Risperdal for psychosis but move all 4mg to nighttime  ; awaiting application for rehab; encourage socialization and groups but patient is usually avoidant.   ;;3/19: much like yesterday; no complaints; not conversational.   ;.  lying in bed;  smiles and says little else  ; no SI but some AH;  fair adls; constricted limited affect ; oriented;.  continue Risperdal for psychosis but move all 4mg to nighttime  ; awaiting application for rehab; encourage socialization and groups but patient is usually avoidant.   ;;3/20: states AH and SI are gone; anticipating going to rehab; no complaints;.  lying in bed;  smiles and says little else  ; no SI and now denies any  AH; however internally preoccupied but smiling;    fair adls; constricted limited affect ; oriented;.  continue Risperdal for psychosis but move all 4mg to nighttime  ; awaiting application for rehab; encourage socialization and groups but patient is usually avoidant.  repeating labs as patient has pallor.   ;;3/21: states AH and SI are gone; anticipating going to rehab; no complaints;.  lying in bed;  smiles and says little else  ; no SI and now denies any  AH; however internally preoccupied but smiling;    fair adls; constricted limited affect ; oriented;.  continue Risperdal for psychosis but moved all 4mg to nighttime  ; awaiting application for rehab; encourage socialization and groups but patient is usually avoidant.    ;;3/22: no  AH or  SI  likes to watch crime stories on TV  ; anticipating going to rehab; no complaints;.  lying in bed; says little but   smiles  ; no SI or    AH; however internally preoccupied but smiling;    fair adls; constricted limited affect ; oriented;.  continue Risperdal for psychosis but moved all 4mg to nighttime  ; awaiting application for rehab; encourage socialization and groups but patient is usually avoidant. 52yo  M, PPHx of cocaine, heroin, EtOH, and cannabis use disorders and self reported Bipolar most recent episode depression, who was BIB self due to SI to jump in front of train in context of cocaine and alcohol relapse, similar to recent psychiatric admission in Nov 2018.  Pt. admits to relapse on crack/cocaine and EtOH earlier today.  He is not clear as to why he was not able to follow up with outpatient providers.  He states he feels safe in the hospital right now, but should he return to the streets in his current state he fears for his life as his depression is so severe he will end his life.  He states he is currently hearing voices telling him to "jump in front of a train." He denies VH. He denies HI.   He reports his mood as "depressed" and endorses difficulty with appetite, sleep, energy, and concentration.  Discussed with pt was the use of ZAMORA given his hx of poor compliance and pt stated he would accept an injection prior to his discharge.  Pt. reports to have been drinking earlier today, he denies any previous episodes of withdrawal seizure and states he has only experienced "shakes" in the past.    ;;3/6: avoidant; under the covers; smiles strangely much like preivous admissions; in past  has had AH as above; resuming Risperdal titratration with likely transition to ZAMORA.  Review community support and may need enhanced community support in view of frequent readmissions.      ;;3/7: stays in bed; minimal responses; indicates he has AH and SI but mostly smiles silently;.  in bed little eye contact; minimal speech; internally preoccupied; inapprpriate affect; staff reports refusal to allow routine procedures such as vital signs; however compliant;.  continue Risperal titration and encourage socialization and compliant with routine procedures.   ;;3/8: minimal responses; lying in bed eyes closed while awake; smiles inidcates he is okay but endorses SI and AH vaguely;.   in bed little eye contact; minimal speech; internally preoccupied; inapprpriate a...ng in the am by the TV smiling; says little; no SI but some AH;  fair adls; constricted limited affect ; oriented;.  continue Risperdal for psychosis ; awaiting application for rehab; encourage socialization and groups but patient is usually avoidant.   ;;3/18: not conversational; limited replies to questions; wants meds at night; continues to want rehab;.  lying in bed;  smiles and says little else  ; no SI but some AH;  fair adls; constricted limited affect ; oriented;.  continue Risperdal for psychosis but move all 4mg to nighttime  ; awaiting application for rehab; encourage socialization and groups but patient is usually avoidant.   ;;3/19: much like yesterday; no complaints; not conversational.   ;.  lying in bed;  smiles and says little else  ; no SI but some AH;  fair adls; constricted limited affect ; oriented;.  continue Risperdal for psychosis but move all 4mg to nighttime  ; awaiting application for rehab; encourage socialization and groups but patient is usually avoidant.   ;;3/20: states AH and SI are gone; anticipating going to rehab; no complaints;.  lying in bed;  smiles and says little else  ; no SI and now denies any  AH; however internally preoccupied but smiling;    fair adls; constricted limited affect ; oriented;.  continue Risperdal for psychosis but move all 4mg to nighttime  ; awaiting application for rehab; encourage socialization and groups but patient is usually avoidant.  repeating labs as patient has pallor.   ;;3/21: states AH and SI are gone; anticipating going to rehab; no complaints;.  lying in bed;  smiles and says little else  ; no SI and now denies any  AH; however internally preoccupied but smiling;    fair adls; constricted limited affect ; oriented;.  continue Risperdal for psychosis but moved all 4mg to nighttime  ; awaiting application for rehab; encourage socialization and groups but patient is usually avoidant.    ;;3/22: no  AH or  SI  likes to watch crime stories on TV  ; anticipating going to rehab; no complaints;.  lying in bed; says little but   smiles  ; no SI or    AH; however internally preoccupied but smiling;    fair adls; constricted limited affect ; oriented;.  continue Risperdal for psychosis but moved all 4mg to nighttime  ; awaiting application for rehab; encourage socialization and groups but patient is usually avoidant.  3/25 (AN): pt has no complaints, denies AH or SI; says little; internally preoccupied; withdrawn; avoidant; constricted affect; cont Risperdal 4mg at bedtime for psychosis; awaiting application for rehab

## 2019-03-26 PROCEDURE — 99232 SBSQ HOSP IP/OBS MODERATE 35: CPT

## 2019-03-26 RX ADMIN — RISPERIDONE 4 MILLIGRAM(S): 4 TABLET ORAL at 21:20

## 2019-03-26 RX ADMIN — Medication 1 PATCH: at 09:49

## 2019-03-26 RX ADMIN — Medication 25 MILLIGRAM(S): at 21:20

## 2019-03-26 RX ADMIN — Medication 1 PATCH: at 19:50

## 2019-03-26 NOTE — PROGRESS NOTE BEHAVIORAL HEALTH - NSBHFUPINTERVALCCFT_PSY_A_CORE
no  AH or  SI  not conversational today; lying in bed smiling looking at the writer;  aware of plan for  rehab; no complaints

## 2019-03-26 NOTE — PROGRESS NOTE BEHAVIORAL HEALTH - NS ED BHA AXIS I SECONDARY3 CODE FT
F13.10

## 2019-03-26 NOTE — PROGRESS NOTE BEHAVIORAL HEALTH - SECONDARY DX3
Sedative abuse

## 2019-03-26 NOTE — PROGRESS NOTE BEHAVIORAL HEALTH - SUMMARY
52yo  M, PPHx of cocaine, heroin, EtOH, and cannabis use disorders and self reported Bipolar most recent episode depression, who was BIB self due to SI to jump in front of train in context of cocaine and alcohol relapse, similar to recent psychiatric admission in Nov 2018.  Pt. admits to relapse on crack/cocaine and EtOH earlier today.  He is not clear as to why he was not able to follow up with outpatient providers.  He states he feels safe in the hospital right now, but should he return to the streets in his current state he fears for his life as his depression is so severe he will end his life.  He states he is currently hearing voices telling him to "jump in front of a train." He denies VH. He denies HI.   He reports his mood as "depressed" and endorses difficulty with appetite, sleep, energy, and concentration.  Discussed with pt was the use of ZAMORA given his hx of poor compliance and pt stated he would accept an injection prior to his discharge.  Pt. reports to have been drinking earlier today, he denies any previous episodes of withdrawal seizure and states he has only experienced "shakes" in the past.    ;;3/6: avoidant; under the covers; smiles strangely much like preivous admissions; in past  has had AH as above; resuming Risperdal titratration with likely transition to ZMAORA.  Review community support and may need enhanced community support in view of frequent readmissions.      ;;3/7: stays in bed; minimal responses; indicates he has AH and SI but mostly smiles silently;.  in bed little eye contact; minimal speech; internally preoccupied; inapprpriate affect; staff reports refusal to allow routine procedures such as vital signs; however compliant;.  continue Risperal titration and encourage socialization and compliant with routine procedures.   ;;3/8: minimal responses; lying in bed eyes closed while awake; smiles inidcates he is okay but endorses SI and AH vaguely;.   in bed little eye contact; minimal speech; internally preoccupied; inapprpriate a...ng in the am by the TV smiling; says little; no SI but some AH;  fair adls; constricted limited affect ; oriented;.  continue Risperdal for psychosis ; awaiting application for rehab; encourage socialization and groups but patient is usually avoidant.   ;;3/18: not conversational; limited replies to questions; wants meds at night; continues to want rehab;.  lying in bed;  smiles and says little else  ; no SI but some AH;  fair adls; constricted limited affect ; oriented;.  continue Risperdal for psychosis but move all 4mg to nighttime  ; awaiting application for rehab; encourage socialization and groups but patient is usually avoidant.   ;;3/19: much like yesterday; no complaints; not conversational.   ;.  lying in bed;  smiles and says little else  ; no SI but some AH;  fair adls; constricted limited affect ; oriented;.  continue Risperdal for psychosis but move all 4mg to nighttime  ; awaiting application for rehab; encourage socialization and groups but patient is usually avoidant.   ;;3/20: states AH and SI are gone; anticipating going to rehab; no complaints;.  lying in bed;  smiles and says little else  ; no SI and now denies any  AH; however internally preoccupied but smiling;    fair adls; constricted limited affect ; oriented;.  continue Risperdal for psychosis but move all 4mg to nighttime  ; awaiting application for rehab; encourage socialization and groups but patient is usually avoidant.  repeating labs as patient has pallor.   ;;3/21: states AH and SI are gone; anticipating going to rehab; no complaints;.  lying in bed;  smiles and says little else  ; no SI and now denies any  AH; however internally preoccupied but smiling;    fair adls; constricted limited affect ; oriented;.  continue Risperdal for psychosis but moved all 4mg to nighttime  ; awaiting application for rehab; encourage socialization and groups but patient is usually avoidant.    ;;3/22: no  AH or  SI  likes to watch crime stories on TV  ; anticipating going to rehab; no complaints;.  lying in bed; says little but   smiles  ; no SI or    AH; however internally preoccupied but smiling;    fair adls; constricted limited affect ; oriented;.  continue Risperdal for psychosis but moved all 4mg to nighttime  ; awaiting application for rehab; encourage socialization and groups but patient is usually avoidant.    ;;3/26: no  AH or  SI  not conversational today; lying in bed smiling looking at the writer;  aware of plan for  rehab; no complaints;.  lying in bed; says little but   smiles  ; no SI or    AH; however internally preoccupied but smiling;    fair adls; constricted limited affect ; oriented;.  continue Risperdal for psychosis but moved all 4mg to nighttime  ; awaiting application for rehab; encourage socialization and groups but patient is usually avoidant.

## 2019-03-27 DIAGNOSIS — F25.1 SCHIZOAFFECTIVE DISORDER, DEPRESSIVE TYPE: ICD-10-CM

## 2019-03-27 PROCEDURE — 99232 SBSQ HOSP IP/OBS MODERATE 35: CPT

## 2019-03-27 RX ADMIN — Medication 1 PATCH: at 12:15

## 2019-03-27 RX ADMIN — Medication 1 PATCH: at 12:09

## 2019-03-27 RX ADMIN — RISPERIDONE 4 MILLIGRAM(S): 4 TABLET ORAL at 21:07

## 2019-03-27 RX ADMIN — Medication 25 MILLIGRAM(S): at 21:07

## 2019-03-27 NOTE — PROGRESS NOTE BEHAVIORAL HEALTH - NSBHFUPINTERVALHXFT_PSY_A_CORE
sits with others in the dayroom; no s/h i/i/p denies AVH; anticipates rehab; little speech; some blocking

## 2019-03-27 NOTE — PROGRESS NOTE BEHAVIORAL HEALTH - SUMMARY
54yo  M, PPHx of cocaine, heroin, EtOH, and cannabis use disorders and self reported Bipolar most recent episode depression, who was BIB self due to SI to jump in front of train in context of cocaine and alcohol relapse, similar to recent psychiatric admission in Nov 2018.  Pt. admits to relapse on crack/cocaine and EtOH earlier today.  He is not clear as to why he was not able to follow up with outpatient providers.  He states he feels safe in the hospital right now, but should he return to the streets in his current state he fears for his life as his depression is so severe he will end his life.  He states he is currently hearing voices telling him to "jump in front of a train." He denies VH. He denies HI.   He reports his mood as "depressed" and endorses difficulty with appetite, sleep, energy, and concentration.  Discussed with pt was the use of ZAMORA given his hx of poor compliance and pt stated he would accept an injection prior to his discharge.  Pt. reports to have been drinking earlier today, he denies any previous episodes of withdrawal seizure and states he has only experienced "shakes" in the past.    ;;3/6: avoidant; under the covers; smiles strangely much like preivous admissions; in past  has had AH as above; resuming Risperdal titratration with likely transition to ZAMORA.  Review community support and may need enhanced community support in view of frequent readmissions.      ;;3/7: stays in bed; minimal responses; indicates he has AH and SI but mostly smiles silently;.  in bed little eye contact; minimal speech; internally preoccupied; inapprpriate affect; staff reports refusal to allow routine procedures such as vital signs; however compliant;.  continue Risperal titration and encourage socialization and compliant with routine procedures.   ;;3/8: minimal responses; lying in bed eyes closed while awake; smiles inidcates he is okay but endorses SI and AH vaguely;.   in bed little eye contact; minimal speech; internally preoccupied; inapprpriate a...ng in the am by the TV smiling; says little; no SI but some AH;  fair adls; constricted limited affect ; oriented;.  continue Risperdal for psychosis ; awaiting application for rehab; encourage socialization and groups but patient is usually avoidant.   ;;3/18: not conversational; limited replies to questions; wants meds at night; continues to want rehab;.  lying in bed;  smiles and says little else  ; no SI but some AH;  fair adls; constricted limited affect ; oriented;.  continue Risperdal for psychosis but move all 4mg to nighttime  ; awaiting application for rehab; encourage socialization and groups but patient is usually avoidant.   ;;3/19: much like yesterday; no complaints; not conversational.   ;.  lying in bed;  smiles and says little else  ; no SI but some AH;  fair adls; constricted limited affect ; oriented;.  continue Risperdal for psychosis but move all 4mg to nighttime  ; awaiting application for rehab; encourage socialization and groups but patient is usually avoidant.   ;;3/20: states AH and SI are gone; anticipating going to rehab; no complaints;.  lying in bed;  smiles and says little else  ; no SI and now denies any  AH; however internally preoccupied but smiling;    fair adls; constricted limited affect ; oriented;.  continue Risperdal for psychosis but move all 4mg to nighttime  ; awaiting application for rehab; encourage socialization and groups but patient is usually avoidant.  repeating labs as patient has pallor.   ;;3/21: states AH and SI are gone; anticipating going to rehab; no complaints;.  lying in bed;  smiles and says little else  ; no SI and now denies any  AH; however internally preoccupied but smiling;    fair adls; constricted limited affect ; oriented;.  continue Risperdal for psychosis but moved all 4mg to nighttime  ; awaiting application for rehab; encourage socialization and groups but patient is usually avoidant.    ;;3/22: no  AH or  SI  likes to watch crime stories on TV  ; anticipating going to rehab; no complaints;.  lying in bed; says little but   smiles  ; no SI or    AH; however internally preoccupied but smiling;    fair adls; constricted limited affect ; oriented;.  continue Risperdal for psychosis but moved all 4mg to nighttime  ; awaiting application for rehab; encourage socialization and groups but patient is usually avoidant.    ;;3/26: no  AH or  SI  not conversational today; lying in bed smiling looking at the writer;  aware of plan for  rehab; no complaints;.  lying in bed; says little but   smiles  ; no SI or    AH; however internally preoccupied but smiling;    fair adls; constricted limited affect ; oriented;.  continue Risperdal for psychosis but moved all 4mg to nighttime  ; awaiting application for rehab; encourage socialization and groups but patient is usually avoidant.    ;;3/27: no discussion no complaints;.  sits with others in the dayroom; no s/h i/i/p denies AVH; anticipates rehab; little speech; some blocking;.  awaiting rehab continue Risperdal for psychosis.

## 2019-03-28 PROCEDURE — 99232 SBSQ HOSP IP/OBS MODERATE 35: CPT

## 2019-03-28 RX ADMIN — Medication 25 MILLIGRAM(S): at 21:14

## 2019-03-28 RX ADMIN — Medication 1 PATCH: at 18:59

## 2019-03-28 RX ADMIN — Medication 1 PATCH: at 12:00

## 2019-03-28 RX ADMIN — RISPERIDONE 4 MILLIGRAM(S): 4 TABLET ORAL at 21:13

## 2019-03-28 RX ADMIN — Medication 1 PATCH: at 10:22

## 2019-03-28 NOTE — PROGRESS NOTE BEHAVIORAL HEALTH - NSBHFUPINTERVALCCFT_PSY_A_CORE
continues to endorse wish to go to rehab but not conversations; no complaints continues to endorse wish to go to rehab but not conversational ; no complaints

## 2019-03-28 NOTE — PROGRESS NOTE BEHAVIORAL HEALTH - NSBHFUPINTERVALHXFT_PSY_A_CORE
sits up in bed and smiles at the writer but no or minimal speech; no tremor; internally preoccupied but denies avh or s/h i/i/p

## 2019-03-28 NOTE — PROGRESS NOTE BEHAVIORAL HEALTH - SUMMARY
54yo  M, PPHx of cocaine, heroin, EtOH, and cannabis use disorders and self reported Bipolar most recent episode depression, who was BIB self due to SI to jump in front of train in context of cocaine and alcohol relapse, similar to recent psychiatric admission in Nov 2018.  Pt. admits to relapse on crack/cocaine and EtOH earlier today.  He is not clear as to why he was not able to follow up with outpatient providers.  He states he feels safe in the hospital right now, but should he return to the streets in his current state he fears for his life as his depression is so severe he will end his life.  He states he is currently hearing voices telling him to "jump in front of a train." He denies VH. He denies HI.   He reports his mood as "depressed" and endorses difficulty with appetite, sleep, energy, and concentration.  Discussed with pt was the use of ZAMORA given his hx of poor compliance and pt stated he would accept an injection prior to his discharge.  Pt. reports to have been drinking earlier today, he denies any previous episodes of withdrawal seizure and states he has only experienced "shakes" in the past.    ;;3/6: avoidant; under the covers; smiles strangely much like preivous admissions; in past  has had AH as above; resuming Risperdal titratration with likely transition to ZAMORA.  Review community support and may need enhanced community support in view of frequent readmissions.      ;;3/7: stays in bed; minimal responses; indicates he has AH and SI but mostly smiles silently;.  in bed little eye contact; minimal speech; internally preoccupied; inapprpriate affect; staff reports refusal to allow routine procedures such as vital signs; however compliant;.  continue Risperal titration and encourage socialization and compliant with routine procedures.   ;;3/8: minimal responses; lying in bed eyes closed while awake; smiles inidcates he is okay but endorses SI and AH vaguely;.   in bed little eye contact; minimal speech; internally preoccupied; inapprpriate a...ourage socialization and groups but patient is usually avoidant.    ;;3/26: no  AH or  SI  not conversational today; lying in bed smiling looking at the writer;  aware of plan for  rehab; no complaints;.  lying in bed; says little but   smiles  ; no SI or    AH; however internally preoccupied but smiling;    fair adls; constricted limited affect ; oriented;.  continue Risperdal for psychosis but moved all 4mg to nighttime  ; awaiting application for rehab; encourage socialization and groups but patient is usually avoidant.    ;;3/27: no discussion no complaints;.  sits with others in the dayroom; no s/h i/i/p denies AVH; anticipates rehab; little speech; some blocking;.  awaiting rehab continue Risperdal for psychosis.    ;;3/28: continues to endorse wish to go to rehab but not conversations; no complaints;.  sits up in bed and smiles at the writer but no or minimal speech; no tremor; internally preoccupied but denies avh or s/h i/i/p;.  continue Risperdal for psychosis; awaits rehab.

## 2019-03-29 PROCEDURE — 99232 SBSQ HOSP IP/OBS MODERATE 35: CPT

## 2019-03-29 RX ORDER — TUBERCULIN PURIFIED PROTEIN DERIVATIVE 5 [IU]/.1ML
5 INJECTION, SOLUTION INTRADERMAL ONCE
Qty: 0 | Refills: 0 | Status: COMPLETED | OUTPATIENT
Start: 2019-03-29 | End: 2019-03-29

## 2019-03-29 RX ADMIN — Medication 25 MILLIGRAM(S): at 21:25

## 2019-03-29 RX ADMIN — Medication 1 PATCH: at 10:00

## 2019-03-29 RX ADMIN — Medication 1 PATCH: at 19:34

## 2019-03-29 RX ADMIN — RISPERIDONE 4 MILLIGRAM(S): 4 TABLET ORAL at 21:25

## 2019-03-29 RX ADMIN — Medication 1 PATCH: at 15:43

## 2019-03-29 RX ADMIN — Medication 1 PATCH: at 08:35

## 2019-03-29 RX ADMIN — TUBERCULIN PURIFIED PROTEIN DERIVATIVE 5 UNIT(S): 5 INJECTION, SOLUTION INTRADERMAL at 15:51

## 2019-03-29 NOTE — PROGRESS NOTE BEHAVIORAL HEALTH - NSBHFUPINTERVALCCFT_PSY_A_CORE
has had a PPD before and agrees to a PPD prior to rehab;  staff notes improved mood; no sleep appetite or pain compalints

## 2019-03-29 NOTE — PROGRESS NOTE BEHAVIORAL HEALTH - SUMMARY
54yo  M, PPHx of cocaine, heroin, EtOH, and cannabis use disorders and self reported Bipolar most recent episode depression, who was BIB self due to SI to jump in front of train in context of cocaine and alcohol relapse, similar to recent psychiatric admission in Nov 2018.  Pt. admits to relapse on crack/cocaine and EtOH earlier today.  He is not clear as to why he was not able to follow up with outpatient providers.  He states he feels safe in the hospital right now, but should he return to the streets in his current state he fears for his life as his depression is so severe he will end his life.  He states he is currently hearing voices telling him to "jump in front of a train." He denies VH. He denies HI.   He reports his mood as "depressed" and endorses difficulty with appetite, sleep, energy, and concentration.  Discussed with pt was the use of ZAMORA given his hx of poor compliance and pt stated he would accept an injection prior to his discharge.  Pt. reports to have been drinking earlier today, he denies any previous episodes of withdrawal seizure and states he has only experienced "shakes" in the past.    ;;3/6: avoidant; under the covers; smiles strangely much like preivous admissions; in past  has had AH as above; resuming Risperdal titratration with likely transition to ZAMORA.  Review community support and may need enhanced community support in view of frequent readmissions.      ;;3/7: stays in bed; minimal responses; indicates he has AH and SI but mostly smiles silently;.  in bed little eye contact; minimal speech; internally preoccupied; inapprpriate affect; staff reports refusal to allow routine procedures such as vital signs; however compliant;.  continue Risperal titration and encourage socialization and compliant with routine procedures.   ;;3/8: minimal responses; lying in bed eyes closed while awake; smiles inidcates he is okay but endorses SI and AH vaguely;.   in bed little eye contact; minimal speech; internally preoccupied; inapprpriate a...ir adls; constricted limited affect ; oriented;.  continue Risperdal for psychosis but moved all 4mg to nighttime  ; awaiting application for rehab; encourage socialization and groups but patient is usually avoidant.    ;;3/27: no discussion no complaints;.  sits with others in the dayroom; no s/h i/i/p denies AVH; anticipates rehab; little speech; some blocking;.  awaiting rehab continue Risperdal for psychosis.    ;;3/28: continues to endorse wish to go to rehab but not conversations; no complaints;.  sits up in bed and smiles at the writer but no or minimal speech; no tremor; internally preoccupied but denies avh or s/h i/i/p;.  continue Risperdal for psychosis; awaits rehab.   ;;3/29: has had a PPD before and agrees to a PPD prior to rehab;  staff notes improved mood; no sleep appetite or pain compalints;.  smiling in bed; minimal verbalizations; fair ads ; no s/h i/i/p or avh but impoverished thinking; cog intact; oriented; no tremor;.  continue Risperdal regime; PPD; for rehab.

## 2019-03-29 NOTE — PROGRESS NOTE BEHAVIORAL HEALTH - NSBHFUPINTERVALHXFT_PSY_A_CORE
smiling in bed; minimal verbalizations; fair ads ; no s/h i/i/p or avh but impoverished thinking; cog intact; oriented; no tremor smiling in bed; minimal verbalizations; fair ads ; no s/h i/i/p or avh but impoverished thinking; cog intact; oriented; no tremor;  PPD planted today.

## 2019-03-30 RX ADMIN — Medication 1 PATCH: at 10:55

## 2019-03-30 RX ADMIN — Medication 25 MILLIGRAM(S): at 21:05

## 2019-03-30 RX ADMIN — Medication 1 PATCH: at 18:01

## 2019-03-30 RX ADMIN — RISPERIDONE 4 MILLIGRAM(S): 4 TABLET ORAL at 21:06

## 2019-03-30 RX ADMIN — Medication 1 PATCH: at 10:56

## 2019-03-31 RX ADMIN — Medication 1 PATCH: at 10:24

## 2019-03-31 RX ADMIN — Medication 1 PATCH: at 10:26

## 2019-03-31 RX ADMIN — RISPERIDONE 4 MILLIGRAM(S): 4 TABLET ORAL at 21:12

## 2019-03-31 RX ADMIN — Medication 1 PATCH: at 06:52

## 2019-03-31 RX ADMIN — Medication 25 MILLIGRAM(S): at 21:12

## 2019-04-01 PROCEDURE — 99232 SBSQ HOSP IP/OBS MODERATE 35: CPT

## 2019-04-01 RX ADMIN — Medication 1 PATCH: at 11:08

## 2019-04-01 RX ADMIN — Medication 1 PATCH: at 06:30

## 2019-04-01 RX ADMIN — TUBERCULIN PURIFIED PROTEIN DERIVATIVE 5 UNIT(S): 5 INJECTION, SOLUTION INTRADERMAL at 19:39

## 2019-04-01 RX ADMIN — RISPERIDONE 4 MILLIGRAM(S): 4 TABLET ORAL at 21:44

## 2019-04-01 RX ADMIN — Medication 1 PATCH: at 11:56

## 2019-04-01 RX ADMIN — Medication 25 MILLIGRAM(S): at 21:44

## 2019-04-01 NOTE — PROGRESS NOTE BEHAVIORAL HEALTH - SUMMARY
52yo  M, PPHx of cocaine, heroin, EtOH, and cannabis use disorders and self reported Bipolar most recent episode depression, who was BIB self due to SI to jump in front of train in context of cocaine and alcohol relapse, similar to recent psychiatric admission in Nov 2018.  Pt. admits to relapse on crack/cocaine and EtOH earlier today.  He is not clear as to why he was not able to follow up with outpatient providers.  He states he feels safe in the hospital right now, but should he return to the streets in his current state he fears for his life as his depression is so severe he will end his life.  He states he is currently hearing voices telling him to "jump in front of a train." He denies VH. He denies HI.   He reports his mood as "depressed" and endorses difficulty with appetite, sleep, energy, and concentration.  Discussed with pt was the use of ZAMORA given his hx of poor compliance and pt stated he would accept an injection prior to his discharge.  Pt. reports to have been drinking earlier today, he denies any previous episodes of withdrawal seizure and states he has only experienced "shakes" in the past.    ;;3/6: avoidant; under the covers; smiles strangely much like preivous admissions; in past  has had AH as above; resuming Risperdal titratration with likely transition to ZAMORA.  Review community support and may need enhanced community support in view of frequent readmissions.      ;;3/7: stays in bed; minimal responses; indicates he has AH and SI but mostly smiles silently;.  in bed little eye contact; minimal speech; internally preoccupied; inapprpriate affect; staff reports refusal to allow routine procedures such as vital signs; however compliant;.  continue Risperal titration and encourage socialization and compliant with routine procedures.   ;;3/8: minimal responses; lying in bed eyes closed while awake; smiles inidcates he is okay but endorses SI and AH vaguely;.   in bed little eye contact; minimal speech; internally preoccupied; inapprpriate a...om; no s/h i/i/p denies AVH; anticipates rehab; little speech; some blocking;.  awaiting rehab continue Risperdal for psychosis.    ;;3/28: continues to endorse wish to go to rehab but not conversations; no complaints;.  sits up in bed and smiles at the writer but no or minimal speech; no tremor; internally preoccupied but denies avh or s/h i/i/p;.  continue Risperdal for psychosis; awaits rehab.   ;;3/29: has had a PPD before and agrees to a PPD prior to rehab;  staff notes improved mood; no sleep appetite or pain compalints;.  smiling in bed; minimal verbalizations; fair ads ; no s/h i/i/p or avh but impoverished thinking; cog intact; oriented; no tremor;.  continue Risperdal regime; PPD; for rehab.    ;;4/1: sleep appetite ok; no complaints;.  PPD is negative;  staff notes improved mood; no sleep appetite or pain compalints;.  smiling in bed; minimal verbalizations; fair ads ; no s/h i/i/p or avh but impoverished thinking; cog intact; oriented; no tremor;.  continue Risperdal regime

## 2019-04-01 NOTE — PROGRESS NOTE BEHAVIORAL HEALTH - NSBHFUPINTERVALHXFT_PSY_A_CORE
PPD is negative;  staff notes improved mood; no sleep appetite or pain compalints PPD is negative;  staff notes improved mood; no sleep appetite or pain complaints

## 2019-04-02 PROCEDURE — 99232 SBSQ HOSP IP/OBS MODERATE 35: CPT

## 2019-04-02 RX ADMIN — Medication 25 MILLIGRAM(S): at 21:06

## 2019-04-02 RX ADMIN — Medication 1 PATCH: at 13:48

## 2019-04-02 RX ADMIN — RISPERIDONE 4 MILLIGRAM(S): 4 TABLET ORAL at 21:06

## 2019-04-02 NOTE — PROGRESS NOTE BEHAVIORAL HEALTH - NSBHFUPINTERVALHXFT_PSY_A_CORE
quietyly smiling in bed; minimal verbalizations; fair ads ; no s/h i/i/p or avh but impoverished thinking; cog intact; oriented; no tremor quietly smiling in bed; minimal verbalizations; fair ads ; no s/h i/i/p or avh but impoverished thinking; cog intact; oriented; no tremor

## 2019-04-02 NOTE — PROGRESS NOTE BEHAVIORAL HEALTH - SUMMARY
52yo  M, PPHx of cocaine, heroin, EtOH, and cannabis use disorders and self reported Bipolar most recent episode depression, who was BIB self due to SI to jump in front of train in context of cocaine and alcohol relapse, similar to recent psychiatric admission in Nov 2018.  Pt. admits to relapse on crack/cocaine and EtOH earlier today.  He is not clear as to why he was not able to follow up with outpatient providers.  He states he feels safe in the hospital right now, but should he return to the streets in his current state he fears for his life as his depression is so severe he will end his life.  He states he is currently hearing voices telling him to "jump in front of a train." He denies VH. He denies HI.   He reports his mood as "depressed" and endorses difficulty with appetite, sleep, energy, and concentration.  Discussed with pt was the use of ZAMORA given his hx of poor compliance and pt stated he would accept an injection prior to his discharge.  Pt. reports to have been drinking earlier today, he denies any previous episodes of withdrawal seizure and states he has only experienced "shakes" in the past.    ;;3/6: avoidant; under the covers; smiles strangely much like preivous admissions; in past  has had AH as above; resuming Risperdal titratration with likely transition to ZAMORA.  Review community support and may need enhanced community support in view of frequent readmissions.      ;;3/7: stays in bed; minimal responses; indicates he has AH and SI but mostly smiles silently;.  in bed little eye contact; minimal speech; internally preoccupied; inapprpriate affect; staff reports refusal to allow routine procedures such as vital signs; however compliant;.  continue Risperal titration and encourage socialization and compliant with routine procedures.   ;;3/8: minimal responses; lying in bed eyes closed while awake; smiles inidcates he is okay but endorses SI and AH vaguely;.   in bed little eye contact; minimal speech; internally preoccupied; inapprpriate a...om; no s/h i/i/p denies AVH; anticipates rehab; little speech; some blocking;.  awaiting rehab continue Risperdal for psychosis.    ;;3/28: continues to endorse wish to go to rehab but not conversations; no complaints;.  sits up in bed and smiles at the writer but no or minimal speech; no tremor; internally preoccupied but denies avh or s/h i/i/p;.  continue Risperdal for psychosis; awaits rehab.   ;;3/29: has had a PPD before and agrees to a PPD prior to rehab;  staff notes improved mood; no sleep appetite or pain compalints;.  smiling in bed; minimal verbalizations; fair ads ; no s/h i/i/p or avh but impoverished thinking; cog intact; oriented; no tremor;.  continue Risperdal regime; PPD; for rehab.    ;;4/1: sleep appetite ok; no complaints;.  PPD is negative;  staff notes improved mood; no sleep appetite or pain compalints;.  smiling in bed; minimal verbalizations; fair ads ; no s/h i/i/p or avh but impoverished thinking; cog intact; oriented; no tremor;.  continue Risperdal regime  ;;4/2:  sleep appetite ok; interviewed for [facility] program for rehab; no sleep appetite or pain compalints;.  quietyly smiling in bed; minimal verbalizations; fair ads ; no s/h i/i/p or avh but impoverished thinking; cog intact; oriented; no tremor;.  continue Risperdal regime; new labs cbc cmp prior to likely acceptance at [facility]. 54yo  M, PPHx of cocaine, heroin, EtOH, and cannabis use disorders and self reported Bipolar most recent episode depression, who was BIB self due to SI to jump in front of train in context of cocaine and alcohol relapse, similar to recent psychiatric admission in Nov 2018.  Pt. admits to relapse on crack/cocaine and EtOH earlier today.  He is not clear as to why he was not able to follow up with outpatient providers.  He states he feels safe in the hospital right now, but should he return to the streets in his current state he fears for his life as his depression is so severe he will end his life.  He states he is currently hearing voices telling him to "jump in front of a train." He denies VH. He denies HI.   He reports his mood as "depressed" and endorses difficulty with appetite, sleep, energy, and concentration.  Discussed with pt was the use of ZAMORA given his hx of poor compliance and pt stated he would accept an injection prior to his discharge.  Pt. reports to have been drinking earlier today, he denies any previous episodes of withdrawal seizure and states he has only experienced "shakes" in the past.    ;;3/6: avoidant; under the covers; smiles strangely much like preivous admissions; in past  has had AH as above; resuming Risperdal titratration with likely transition to ZAMORA.  Review community support and may need enhanced community support in view of frequent readmissions.      ;;3/7: stays in bed; minimal responses; indicates he has AH and SI but mostly smiles silently;.  in bed little eye contact; minimal speech; internally preoccupied; inapprpriate affect; staff reports refusal to allow routine procedures such as vital signs; however compliant;.  continue Risperal titration and encourage socialization and compliant with routine procedures.   ;;3/8: minimal responses; lying in bed eyes closed while awake; smiles inidcates he is okay but endorses SI and AH vaguely;.   in bed little eye contact; minimal speech; internally preoccupied; inapprpriate a...om; no s/h i/i/p denies AVH; anticipates rehab; little speech; some blocking;.  awaiting rehab continue Risperdal for psychosis.    ;;3/28: continues to endorse wish to go to rehab but not conversations; no complaints;.  sits up in bed and smiles at the writer but no or minimal speech; no tremor; internally preoccupied but denies avh or s/h i/i/p;.  continue Risperdal for psychosis; awaits rehab.   ;;3/29: has had a PPD before and agrees to a PPD prior to rehab;  staff notes improved mood; no sleep appetite or pain complaints;.  smiling in bed; minimal verbalizations; fair ads ; no s/h i/i/p or avh but impoverished thinking; cog intact; oriented; no tremor;.  continue Risperdal regime; PPD; for rehab.    ;;4/1: sleep appetite ok; no complaints;.  PPD is negative;  staff notes improved mood; no sleep appetite or pain complaints;.  smiling in bed; minimal verbalizations; fair ads ; no s/h i/i/p or avh but impoverished thinking; cog intact; oriented; no tremor;.  continue Risperdal regime  ;;4/2:  sleep appetite ok; interviewed for [facility] program for rehab; no sleep appetite or pain complaints;.  quietly smiling in bed; minimal verbalizations; fair ads ; no s/h i/i/p or avh but impoverished thinking; cog intact; oriented; no tremor;.  continue Risperdal regime; new labs cbc cmp prior to likely acceptance at [facility].

## 2019-04-02 NOTE — PROGRESS NOTE BEHAVIORAL HEALTH - NSBHFUPINTERVALCCFT_PSY_A_CORE
sleep appetite ok; interviewed for [facility] for rehab; no sleep appetite or pain compalints sleep appetite ok; interviewed for [facility] for rehab; no sleep appetite or pain complaints

## 2019-04-03 LAB
ALBUMIN SERPL ELPH-MCNC: 3.6 G/DL — SIGNIFICANT CHANGE UP (ref 3.3–5)
ALP SERPL-CCNC: 85 U/L — SIGNIFICANT CHANGE UP (ref 40–120)
ALT FLD-CCNC: 7 U/L — LOW (ref 10–45)
ANION GAP SERPL CALC-SCNC: 9 MMOL/L — SIGNIFICANT CHANGE UP (ref 5–17)
AST SERPL-CCNC: 10 U/L — SIGNIFICANT CHANGE UP (ref 10–40)
BASOPHILS # BLD AUTO: 0.03 K/UL — SIGNIFICANT CHANGE UP (ref 0–0.2)
BASOPHILS NFR BLD AUTO: 0.5 % — SIGNIFICANT CHANGE UP (ref 0–2)
BILIRUB SERPL-MCNC: 0.2 MG/DL — SIGNIFICANT CHANGE UP (ref 0.2–1.2)
BUN SERPL-MCNC: 15 MG/DL — SIGNIFICANT CHANGE UP (ref 7–23)
CALCIUM SERPL-MCNC: 9.2 MG/DL — SIGNIFICANT CHANGE UP (ref 8.4–10.5)
CHLORIDE SERPL-SCNC: 105 MMOL/L — SIGNIFICANT CHANGE UP (ref 96–108)
CO2 SERPL-SCNC: 25 MMOL/L — SIGNIFICANT CHANGE UP (ref 22–31)
CREAT SERPL-MCNC: 1.05 MG/DL — SIGNIFICANT CHANGE UP (ref 0.5–1.3)
EOSINOPHIL # BLD AUTO: 0.37 K/UL — SIGNIFICANT CHANGE UP (ref 0–0.5)
EOSINOPHIL NFR BLD AUTO: 6 % — SIGNIFICANT CHANGE UP (ref 0–6)
GLUCOSE SERPL-MCNC: 83 MG/DL — SIGNIFICANT CHANGE UP (ref 70–99)
HCT VFR BLD CALC: 36.7 % — LOW (ref 39–50)
HGB BLD-MCNC: 11.6 G/DL — LOW (ref 13–17)
IMM GRANULOCYTES NFR BLD AUTO: 0.3 % — SIGNIFICANT CHANGE UP (ref 0–1.5)
LYMPHOCYTES # BLD AUTO: 2.12 K/UL — SIGNIFICANT CHANGE UP (ref 1–3.3)
LYMPHOCYTES # BLD AUTO: 34.3 % — SIGNIFICANT CHANGE UP (ref 13–44)
MCHC RBC-ENTMCNC: 24.8 PG — LOW (ref 27–34)
MCHC RBC-ENTMCNC: 31.6 GM/DL — LOW (ref 32–36)
MCV RBC AUTO: 78.6 FL — LOW (ref 80–100)
MONOCYTES # BLD AUTO: 0.62 K/UL — SIGNIFICANT CHANGE UP (ref 0–0.9)
MONOCYTES NFR BLD AUTO: 10 % — SIGNIFICANT CHANGE UP (ref 2–14)
NEUTROPHILS # BLD AUTO: 3.02 K/UL — SIGNIFICANT CHANGE UP (ref 1.8–7.4)
NEUTROPHILS NFR BLD AUTO: 48.9 % — SIGNIFICANT CHANGE UP (ref 43–77)
NRBC # BLD: 0 /100 WBCS — SIGNIFICANT CHANGE UP (ref 0–0)
PLATELET # BLD AUTO: 237 K/UL — SIGNIFICANT CHANGE UP (ref 150–400)
POTASSIUM SERPL-MCNC: 4.2 MMOL/L — SIGNIFICANT CHANGE UP (ref 3.5–5.3)
POTASSIUM SERPL-SCNC: 4.2 MMOL/L — SIGNIFICANT CHANGE UP (ref 3.5–5.3)
PROT SERPL-MCNC: 6.4 G/DL — SIGNIFICANT CHANGE UP (ref 6–8.3)
RBC # BLD: 4.67 M/UL — SIGNIFICANT CHANGE UP (ref 4.2–5.8)
RBC # FLD: 20.2 % — HIGH (ref 10.3–14.5)
SODIUM SERPL-SCNC: 139 MMOL/L — SIGNIFICANT CHANGE UP (ref 135–145)
WBC # BLD: 6.18 K/UL — SIGNIFICANT CHANGE UP (ref 3.8–10.5)
WBC # FLD AUTO: 6.18 K/UL — SIGNIFICANT CHANGE UP (ref 3.8–10.5)

## 2019-04-03 PROCEDURE — 99232 SBSQ HOSP IP/OBS MODERATE 35: CPT

## 2019-04-03 RX ADMIN — Medication 25 MILLIGRAM(S): at 21:20

## 2019-04-03 RX ADMIN — RISPERIDONE 4 MILLIGRAM(S): 4 TABLET ORAL at 21:20

## 2019-04-03 NOTE — PROGRESS NOTE BEHAVIORAL HEALTH - SUMMARY
52yo  M, PPHx of cocaine, heroin, EtOH, and cannabis use disorders and self reported Bipolar most recent episode depression, who was BIB self due to SI to jump in front of train in context of cocaine and alcohol relapse, similar to recent psychiatric admission in Nov 2018.  Pt. admits to relapse on crack/cocaine and EtOH earlier today.  He is not clear as to why he was not able to follow up with outpatient providers.  He states he feels safe in the hospital right now, but should he return to the streets in his current state he fears for his life as his depression is so severe he will end his life.  He states he is currently hearing voices telling him to "jump in front of a train." He denies VH. He denies HI.   He reports his mood as "depressed" and endorses difficulty with appetite, sleep, energy, and concentration.  Discussed with pt was the use of ZAMORA given his hx of poor compliance and pt stated he would accept an injection prior to his discharge.  Pt. reports to have been drinking earlier today, he denies any previous episodes of withdrawal seizure and states he has only experienced "shakes" in the past.    ;;3/6: avoidant; under the covers; smiles strangely much like preivous admissions; in past  has had AH as above; resuming Risperdal titratration with likely transition to ZAMORA.  Review community support and may need enhanced community support in view of frequent readmissions.      ;;3/7: stays in bed; minimal responses; indicates he has AH and SI but mostly smiles silently;.  in bed little eye contact; minimal speech; internally preoccupied; inapprpriate affect; staff reports refusal to allow routine procedures such as vital signs; however compliant;.  continue Risperal titration and encourage socialization and compliant with routine procedures.   ;;3/8: minimal responses; lying in bed eyes closed while awake; smiles inidcates he is okay but endorses SI and AH vaguely;.   in bed little eye contact; minimal speech; internally preoccupied; inapprpriate a...om; no s/h i/i/p denies AVH; anticipates rehab; little speech; some blocking;.  awaiting rehab continue Risperdal for psychosis.    ;;3/28: continues to endorse wish to go to rehab but not conversations; no complaints;.  sits up in bed and smiles at the writer but no or minimal speech; no tremor; internally preoccupied but denies avh or s/h i/i/p;.  continue Risperdal for psychosis; awaits rehab.   ;;3/29: has had a PPD before and agrees to a PPD prior to rehab;  staff notes improved mood; no sleep appetite or pain compalints;.  smiling in bed; minimal verbalizations; fair ads ; no s/h i/i/p or avh but impoverished thinking; cog intact; oriented; no tremor;.  continue Risperdal regime; PPD; for rehab.    ;;4/1: sleep appetite ok; no complaints;.  PPD is negative;  staff notes improved mood; no sleep appetite or pain compalints;.  smiling in bed; minimal verbalizations; fair ads ; no s/h i/i/p or avh but impoverished thinking; cog intact; oriented; no tremor;.  continue Risperdal regime  ;;4/2:  sleep appetite ok; interviewed for [facility] program for rehab; no sleep appetite or pain compalints;.  quietyly smiling in bed; minimal verbalizations; fair ads ; no s/h i/i/p or avh but impoverished thinking; cog intact; oriented; no tremor;.  continue Risperdal regime; new labs cbc cmp prior to likely acceptance at [facility].   ;;4/3: continues more visible and cheerful; not conversational; aware of new labs drawn this am;.  sitting with others; smiling quietly; constricted affect; internalluy preoccupied but denies s/h i/i/p or avh; anticipating going to rehab; adls fair; avoids eye contact;.  continues to maintain progress but still thinking is improverished;  await repeat labs (cbc and cmp).

## 2019-04-03 NOTE — PROGRESS NOTE BEHAVIORAL HEALTH - NSBHFUPINTERVALHXFT_PSY_A_CORE
sitting with others; smiling quietly; constricted affect; internalluy preoccupied but denies s/h i/i/p or avh; anticipating going to rehab; adls fair; avoids eye contact sitting with others; smiling quietly; constricted affect; internally preoccupied but denies s/h i/i/p or avh; anticipating going to rehab; adls fair; avoids eye contact

## 2019-04-04 PROCEDURE — 93005 ELECTROCARDIOGRAM TRACING: CPT

## 2019-04-04 PROCEDURE — 99232 SBSQ HOSP IP/OBS MODERATE 35: CPT

## 2019-04-04 PROCEDURE — 81001 URINALYSIS AUTO W/SCOPE: CPT

## 2019-04-04 PROCEDURE — 85025 COMPLETE CBC W/AUTO DIFF WBC: CPT

## 2019-04-04 PROCEDURE — 36415 COLL VENOUS BLD VENIPUNCTURE: CPT

## 2019-04-04 PROCEDURE — 99285 EMERGENCY DEPT VISIT HI MDM: CPT | Mod: 25

## 2019-04-04 PROCEDURE — 80307 DRUG TEST PRSMV CHEM ANLYZR: CPT

## 2019-04-04 PROCEDURE — 80053 COMPREHEN METABOLIC PANEL: CPT

## 2019-04-04 RX ORDER — RISPERIDONE 4 MG/1
1 TABLET ORAL
Qty: 0 | Refills: 0 | COMMUNITY
Start: 2019-04-04

## 2019-04-04 RX ORDER — HALOPERIDOL DECANOATE 100 MG/ML
5 INJECTION INTRAMUSCULAR EVERY 6 HOURS
Qty: 0 | Refills: 0 | Status: DISCONTINUED | OUTPATIENT
Start: 2019-04-04 | End: 2019-04-05

## 2019-04-04 RX ORDER — HYDROXYZINE HCL 10 MG
1 TABLET ORAL
Qty: 0 | Refills: 0 | COMMUNITY
Start: 2019-04-04

## 2019-04-04 RX ORDER — DIPHENHYDRAMINE HCL 50 MG
25 CAPSULE ORAL EVERY 6 HOURS
Qty: 0 | Refills: 0 | Status: DISCONTINUED | OUTPATIENT
Start: 2019-04-04 | End: 2019-04-05

## 2019-04-04 RX ORDER — ACETAMINOPHEN 500 MG
650 TABLET ORAL EVERY 6 HOURS
Qty: 0 | Refills: 0 | Status: DISCONTINUED | OUTPATIENT
Start: 2019-04-04 | End: 2019-04-05

## 2019-04-04 RX ORDER — HYDROXYZINE HCL 10 MG
25 TABLET ORAL EVERY 6 HOURS
Qty: 0 | Refills: 0 | Status: DISCONTINUED | OUTPATIENT
Start: 2019-04-04 | End: 2019-04-05

## 2019-04-04 RX ADMIN — RISPERIDONE 4 MILLIGRAM(S): 4 TABLET ORAL at 20:54

## 2019-04-04 RX ADMIN — Medication 25 MILLIGRAM(S): at 20:54

## 2019-04-04 RX ADMIN — Medication 1 PATCH: at 11:22

## 2019-04-04 NOTE — PROGRESS NOTE BEHAVIORAL HEALTH - AFFECT QUALITY
Depressed

## 2019-04-04 NOTE — PROGRESS NOTE BEHAVIORAL HEALTH - SUMMARY
54yo  M, PPHx of cocaine, heroin, EtOH, and cannabis use disorders and self reported Bipolar most recent episode depression, who was BIB self due to SI to jump in front of train in context of cocaine and alcohol relapse, similar to recent psychiatric admission in Nov 2018.  Pt. admits to relapse on crack/cocaine and EtOH earlier today.  He is not clear as to why he was not able to follow up with outpatient providers.  He states he feels safe in the hospital right now, but should he return to the streets in his current state he fears for his life as his depression is so severe he will end his life.  He states he is currently hearing voices telling him to "jump in front of a train." He denies VH. He denies HI.   He reports his mood as "depressed" and endorses difficulty with appetite, sleep, energy, and concentration.  Discussed with pt was the use of ZAMORA given his hx of poor compliance and pt stated he would accept an injection prior to his discharge.  Pt. reports to have been drinking earlier today, he denies any previous episodes of withdrawal seizure and states he has only experienced "shakes" in the past.    ;;3/6: avoidant; under the covers; smiles strangely much like preivous admissions; in past  has had AH as above; resuming Risperdal titratration with likely transition to ZAMORA.  Review community support and may need enhanced community support in view of frequent readmissions.      ;;3/7: stays in bed; minimal responses; indicates he has AH and SI but mostly smiles silently;.  in bed little eye contact; minimal speech; internally preoccupied; inapprpriate affect; staff reports refusal to allow routine procedures such as vital signs; however compliant;.  continue Risperal titration and encourage socialization and compliant with routine procedures.   ;;3/8: minimal responses; lying in bed eyes closed while awake; smiles inidcates he is okay but endorses SI and AH vaguely;.   in bed little eye contact; minimal speech; internally preoccupied; inapprpriate a...om; no s/h i/i/p denies AVH; anticipates rehab; little speech; some blocking;.  awaiting rehab continue Risperdal for psychosis.   ...  ;;3/29: continues to endorse wish to go to rehab but not conversations; no complaints;.  sits up in bed and smiles at the writer but no or minimal speech; no tremor; internally preoccupied but denies avh or s/h i/i/p;.  continue Risperdal for psychosis; awaits rehab.   ;;3/29: has had a PPD before and agrees to a PPD prior to rehab;  staff notes improved mood; no sleep appetite or pain compalints;.  smiling in bed; minimal verbalizations; fair ads ; no s/h i/i/p or avh but impoverished thinking; cog intact; oriented; no tremor;.  continue Risperdal regime; PPD; for rehab.    ;;4/1: sleep appetite ok; no complaints;.  PPD is negative;  staff notes improved mood; no sleep appetite or pain compalints;.  smiling in bed; minimal verbalizations; fair ads ; no s/h i/i/p or avh but impoverished thinking; cog intact; oriented; no tremor;.  continue Risperdal regime  ;;4/2:  sleep appetite ok; interviewed for [facility] program for rehab; no sleep appetite or pain compalints;.  quietyly smiling in bed; minimal verbalizations; fair ads ; no s/h i/i/p or avh but impoverished thinking; cog intact; oriented; no tremor;.  continue Risperdal regime; new labs cbc cmp prior to likely acceptance at [facility].   ;;4/4: in bed not converastional ; no complaints ;.   smiling quietly; constricted affect; internalluy preoccupied but denies s/h i/i/p or avh; anticipating going to rehab; adls fair; avoids eye contact;.  continues to maintain progress but still thinking is improverished;  hgb 11.6; no change in cbc or cmp from prior measurements.

## 2019-04-04 NOTE — PROGRESS NOTE BEHAVIORAL HEALTH - NSBHFUPINTERVALHXFT_PSY_A_CORE
smiling quietly; constricted affect; internalluy preoccupied but denies s/h i/i/p or avh; anticipating going to rehab; adls fair; avoids eye contact smiling quietly; constricted affect; internally preoccupied but denies s/h i/i/p or avh; anticipating going to rehab; adls fair; avoids eye contact  Informed that patient accepted for rehab in am.  d/c in am to rehab.

## 2019-04-05 VITALS
SYSTOLIC BLOOD PRESSURE: 120 MMHG | HEART RATE: 110 BPM | TEMPERATURE: 98 F | DIASTOLIC BLOOD PRESSURE: 75 MMHG | RESPIRATION RATE: 20 BRPM

## 2019-04-05 PROCEDURE — 99238 HOSP IP/OBS DSCHRG MGMT 30/<: CPT

## 2019-04-05 RX ADMIN — Medication 1 PATCH: at 07:29

## 2019-04-05 NOTE — PROGRESS NOTE BEHAVIORAL HEALTH - MUSCLE TONE / STRENGTH
Normal muscle tone/strength

## 2019-04-05 NOTE — PROGRESS NOTE BEHAVIORAL HEALTH - BODY HABITUS
Malnourished

## 2019-04-05 NOTE — PROGRESS NOTE BEHAVIORAL HEALTH - PROBLEM SELECTOR PLAN 1
Risperdal titration  ; see interval and summary data for updates; supervision by ACT team to meet with the patient periodically and assist discharge planning
Risperdal ; see interval and summary data for updates
Risperdal titration  ; see interval and summary data for updates; supervision by ACT team to meet with the patient periodically and assist discharge planning
Risperdal titration  ; see interval and summary data for updates; supervision by ACT team to meet with the patient periodically and assist discharge planning
Risperdal ; see interval and summary data for updates
Risperdal titration  ; see interval and summary data for updates; supervision by ACT team to meet with the patient periodically and assist discharge planning
Risperdal ; see interval and summary data for updates
Risperdal titration  ; see interval and summary data for updates; supervision by ACT team to meet with the patient periodically and assist discharge planning
Risperdal ; see interval and summary data for updates
Risperdal ; see interval and summary data for updates
Risperdal titration  ; see interval and summary data for updates; supervision by ACT team to meet with the patient periodically and assist discharge planning

## 2019-04-05 NOTE — PROGRESS NOTE BEHAVIORAL HEALTH - NS ED BHA AXIS I SECONDARY2 CODE FT
F12.10
F14.10
F12.10
F14.10
F14.10
F12.10
F12.10
F14.10
F12.10
F14.10

## 2019-04-05 NOTE — PROGRESS NOTE BEHAVIORAL HEALTH - PROBLEM SELECTOR PROBLEM 1
Schizophrenia, unspecified type

## 2019-04-05 NOTE — PROGRESS NOTE BEHAVIORAL HEALTH - NS ED BHA AXIS I SECONDARY1 CODE FT
F10.94
F10.10
F10.94
F10.10
F10.94
F10.10
F10.10
F10.94
F10.94
F10.10
F10.94
F10.10

## 2019-04-05 NOTE — PROGRESS NOTE BEHAVIORAL HEALTH - NSBHCHARTREVIEWVS_PSY_A_CORE FT
Vital Signs Last 24 Hrs  T(C): 36.6 (06 Mar 2019 20:18), Max: 36.6 (06 Mar 2019 20:18)  T(F): 97.9 (06 Mar 2019 20:18), Max: 97.9 (06 Mar 2019 20:18)  HR: 85 (06 Mar 2019 20:18) (85 - 99)  BP: 99/59 (06 Mar 2019 20:18) (99/59 - 113/76)  BP(mean): --  RR: --  SpO2: --
Vital Signs Last 24 Hrs  T(C): 36.9 (21 Mar 2019 16:35), Max: 36.9 (21 Mar 2019 16:35)  T(F): 98.5 (21 Mar 2019 16:35), Max: 98.5 (21 Mar 2019 16:35)  HR: 69 (21 Mar 2019 16:35) (69 - 92)  BP: 128/82 (21 Mar 2019 16:35) (112/76 - 128/82)  BP(mean): --  RR: 18 (21 Mar 2019 16:35) (18 - 18)  SpO2: --
Vital Signs Last 24 Hrs  T(C): 36.6 (06 Mar 2019 20:18), Max: 36.6 (06 Mar 2019 20:18)  T(F): 97.9 (06 Mar 2019 20:18), Max: 97.9 (06 Mar 2019 20:18)  HR: 85 (06 Mar 2019 20:18) (85 - 99)  BP: 99/59 (06 Mar 2019 20:18) (99/59 - 113/76)  BP(mean): --  RR: --  SpO2: --
Vital Signs Last 24 Hrs  T(C): 36.9 (04 Apr 2019 16:23), Max: 36.9 (04 Apr 2019 16:23)  T(F): 98.5 (04 Apr 2019 16:23), Max: 98.5 (04 Apr 2019 16:23)  HR: 70 (04 Apr 2019 16:23) (70 - 93)  BP: 142/91 (04 Apr 2019 16:23) (120/87 - 142/91)  BP(mean): --  RR: 18 (04 Apr 2019 16:23) (18 - 20)  SpO2: --
Vital Signs Last 24 Hrs  T(C): 37.1 (07 Mar 2019 21:21), Max: 37.2 (07 Mar 2019 17:35)  T(F): 98.8 (07 Mar 2019 21:21), Max: 98.9 (07 Mar 2019 17:35)  HR: 89 (07 Mar 2019 21:21) (78 - 89)  BP: 132/84 (07 Mar 2019 21:21) (132/77 - 144/80)  BP(mean): --  RR: 18 (07 Mar 2019 09:50) (18 - 18)  SpO2: --
Vital Signs Last 24 Hrs  T(C): 36.6 (12 Mar 2019 06:23), Max: 36.8 (11 Mar 2019 09:12)  T(F): 97.9 (12 Mar 2019 06:23), Max: 98.3 (11 Mar 2019 17:41)  HR: 70 (12 Mar 2019 06:23) (70 - 91)  BP: 113/74 (12 Mar 2019 06:23) (113/74 - 123/86)  BP(mean): --  RR: 18 (12 Mar 2019 06:23) (18 - 18)  SpO2: --
Vital Signs Last 24 Hrs  T(C): 36.8 (11 Mar 2019 09:12), Max: 37.4 (10 Mar 2019 17:00)  T(F): 98.2 (11 Mar 2019 09:12), Max: 99.3 (10 Mar 2019 17:00)  HR: 91 (11 Mar 2019 09:12) (75 - 93)  BP: 118/80 (11 Mar 2019 09:12) (114/75 - 120/82)  BP(mean): --  RR: 18 (11 Mar 2019 09:12) (17 - 18)  SpO2: --
Vital Signs Last 24 Hrs  T(C): 36.9 (21 Mar 2019 16:35), Max: 36.9 (21 Mar 2019 16:35)  T(F): 98.5 (21 Mar 2019 16:35), Max: 98.5 (21 Mar 2019 16:35)  HR: 69 (21 Mar 2019 16:35) (69 - 92)  BP: 128/82 (21 Mar 2019 16:35) (112/76 - 128/82)  BP(mean): --  RR: 18 (21 Mar 2019 16:35) (18 - 18)  SpO2: --
Vital Signs Last 24 Hrs  T(C): 36.4 (29 Mar 2019 09:23), Max: 36.7 (28 Mar 2019 16:39)  T(F): 97.6 (29 Mar 2019 09:23), Max: 98.1 (28 Mar 2019 16:39)  HR: 93 (29 Mar 2019 09:23) (82 - 93)  BP: 116/79 (29 Mar 2019 09:23) (114/78 - 116/79)  BP(mean): --  RR: 20 (29 Mar 2019 09:23) (18 - 20)  SpO2: --
Vital Signs Last 24 Hrs  T(C): 36.6 (15 Mar 2019 09:08), Max: 37.2 (14 Mar 2019 20:24)  T(F): 97.8 (15 Mar 2019 09:08), Max: 98.9 (14 Mar 2019 20:24)  HR: 79 (15 Mar 2019 09:08) (79 - 109)  BP: 123/81 (15 Mar 2019 09:08) (123/81 - 133/89)  BP(mean): --  RR: 20 (15 Mar 2019 09:08) (18 - 20)  SpO2: --
Vital Signs Last 24 Hrs  T(C): 36.6 (18 Mar 2019 09:06), Max: 37.1 (17 Mar 2019 16:10)  T(F): 97.9 (18 Mar 2019 09:06), Max: 98.7 (17 Mar 2019 16:10)  HR: 99 (18 Mar 2019 09:06) (80 - 102)  BP: 124/81 (18 Mar 2019 09:06) (122/83 - 155/91)  BP(mean): --  RR: 18 (18 Mar 2019 09:06) (18 - 20)  SpO2: --
Vital Signs Last 24 Hrs  T(C): 36.8 (12 Mar 2019 16:21), Max: 36.8 (12 Mar 2019 16:21)  T(F): 98.2 (12 Mar 2019 16:21), Max: 98.2 (12 Mar 2019 16:21)  HR: 88 (12 Mar 2019 16:21) (88 - 88)  BP: 116/81 (12 Mar 2019 16:21) (116/81 - 116/81)  BP(mean): --  RR: 18 (12 Mar 2019 16:21) (18 - 18)  SpO2: --
Vital Signs Last 24 Hrs  T(C): 36.8 (28 Mar 2019 09:03), Max: 37.3 (27 Mar 2019 16:27)  T(F): 98.3 (28 Mar 2019 09:03), Max: 99.1 (27 Mar 2019 16:27)  HR: 102 (28 Mar 2019 09:03) (75 - 102)  BP: 123/81 (28 Mar 2019 09:03) (103/68 - 123/81)  BP(mean): --  RR: 20 (28 Mar 2019 09:03) (20 - 20)  SpO2: --
Vital Signs Last 24 Hrs  T(C): 36.9 (14 Mar 2019 06:20), Max: 37 (13 Mar 2019 17:00)  T(F): 98.4 (14 Mar 2019 06:20), Max: 98.6 (13 Mar 2019 17:00)  HR: 82 (14 Mar 2019 06:20) (74 - 82)  BP: 95/60 (14 Mar 2019 06:20) (95/60 - 134/88)  BP(mean): --  RR: 18 (14 Mar 2019 06:20) (18 - 18)  SpO2: --
Vital Signs Last 24 Hrs  T(C): 36.9 (19 Mar 2019 17:18), Max: 37 (19 Mar 2019 10:00)  T(F): 98.5 (19 Mar 2019 17:18), Max: 98.6 (19 Mar 2019 10:00)  HR: 108 (19 Mar 2019 17:18) (88 - 108)  BP: 129/81 (19 Mar 2019 17:18) (120/79 - 129/81)  BP(mean): --  RR: 18 (19 Mar 2019 17:18) (18 - 20)  SpO2: --
Vital Signs Last 24 Hrs  T(C): 36.9 (25 Mar 2019 10:08), Max: 36.9 (25 Mar 2019 10:08)  T(F): 98.4 (25 Mar 2019 10:08), Max: 98.4 (25 Mar 2019 10:08)  HR: 114 (25 Mar 2019 10:08) (114 - 114)  BP: 99/70 (25 Mar 2019 10:08) (99/70 - 99/70)  BP(mean): --  RR: --  SpO2: --
Vital Signs Last 24 Hrs  T(C): 37 (18 Mar 2019 17:03), Max: 37 (18 Mar 2019 17:03)  T(F): 98.6 (18 Mar 2019 17:03), Max: 98.6 (18 Mar 2019 17:03)  HR: 101 (18 Mar 2019 17:03) (99 - 101)  BP: 123/77 (18 Mar 2019 17:03) (123/77 - 124/81)  BP(mean): --  RR: 18 (18 Mar 2019 17:03) (18 - 18)  SpO2: --
Vital Signs Last 24 Hrs  T(C): 37 (20 Mar 2019 17:15), Max: 37 (20 Mar 2019 17:15)  T(F): 98.6 (20 Mar 2019 17:15), Max: 98.6 (20 Mar 2019 17:15)  HR: 67 (20 Mar 2019 17:15) (67 - 103)  BP: 127/86 (20 Mar 2019 17:15) (124/82 - 127/86)  BP(mean): --  RR: 18 (20 Mar 2019 17:15) (18 - 20)  SpO2: --
Vital Signs Last 24 Hrs  T(C): 37.1 (02 Apr 2019 17:44), Max: 37.1 (02 Apr 2019 17:44)  T(F): 98.7 (02 Apr 2019 17:44), Max: 98.7 (02 Apr 2019 17:44)  HR: 77 (02 Apr 2019 17:44) (77 - 102)  BP: 126/79 (02 Apr 2019 17:44) (126/79 - 133/89)  BP(mean): --  RR: 18 (02 Apr 2019 17:44) (18 - 20)  SpO2: --
Vital Signs Last 24 Hrs  T(C): 37.1 (26 Mar 2019 16:15), Max: 37.1 (26 Mar 2019 16:15)  T(F): 98.8 (26 Mar 2019 16:15), Max: 98.8 (26 Mar 2019 16:15)  HR: 105 (26 Mar 2019 16:15) (82 - 105)  BP: 148/89 (26 Mar 2019 16:15) (118/84 - 148/89)  BP(mean): --  RR: 18 (26 Mar 2019 16:15) (18 - 20)  SpO2: --
Vital Signs Last 24 Hrs  T(C): 37.1 (31 Mar 2019 17:00), Max: 37.1 (31 Mar 2019 17:00)  T(F): 98.8 (31 Mar 2019 17:00), Max: 98.8 (31 Mar 2019 17:00)  HR: 71 (31 Mar 2019 17:00) (71 - 81)  BP: 149/86 (31 Mar 2019 17:00) (106/73 - 149/86)  BP(mean): --  RR: 17 (31 Mar 2019 17:00) (16 - 17)  SpO2: --
Vital Signs Last 24 Hrs  T(C): 37.2 (01 Apr 2019 16:39), Max: 37.2 (01 Apr 2019 16:39)  T(F): 99 (01 Apr 2019 16:39), Max: 99 (01 Apr 2019 16:39)  HR: 77 (01 Apr 2019 16:39) (77 - 96)  BP: 150/88 (01 Apr 2019 16:39) (125/86 - 150/88)  BP(mean): --  RR: 18 (01 Apr 2019 16:39) (18 - 20)  SpO2: --
Vital Signs Last 24 Hrs  T(C): 37.1 (02 Apr 2019 17:44), Max: 37.1 (02 Apr 2019 17:44)  T(F): 98.7 (02 Apr 2019 17:44), Max: 98.7 (02 Apr 2019 17:44)  HR: 77 (02 Apr 2019 17:44) (77 - 102)  BP: 126/79 (02 Apr 2019 17:44) (126/79 - 133/89)  BP(mean): --  RR: 18 (02 Apr 2019 17:44) (18 - 20)  SpO2: --

## 2019-04-05 NOTE — PROGRESS NOTE BEHAVIORAL HEALTH - NS ED BHA MED ROS PSYCHIATRIC
See HPI

## 2019-04-05 NOTE — PROGRESS NOTE BEHAVIORAL HEALTH - GROOMING
Poor

## 2019-04-05 NOTE — PROGRESS NOTE BEHAVIORAL HEALTH - RISK ASSESSMENT
Risk elements: uses cocaine; uses alcohol; history of schizoaffective illness; past history of suicide; appears depressed; history of bipolar disorder; issues with rehabilitation program; issues with compliance; appears able to comprehend situation; uses or used alcohol; came on own; walk-in; no place to live (undomiciled); ;     Static: substance abuse/dependance; chronic psychiatric disorder; past suicide attempts; mood issues; mood episodes; past psychiatric disorder; possibly isolated; lack of support and  housing; no known family history;     Modifiable: substance abuse counselling and treatment and need for referral; treatment of psychotic sxs; treat underlying mood issues; address substance dependency issues; address/enhance treatment compliance and supervision; improve interpersonal engagement via groups and therapy; improve support and resources for housing;     Protective: cognitively able to engage in treatment; actively  seeking help; has supervision by an ACT team.   Modifiable factors addressed in treatment plan; see summary and interval data for updates    Estimated length of stay based on admission data is 21 days. Estimated discharge date is: 03/26/19.
Risk elements: uses cocaine; uses alcohol; history of schizoaffective illness; past history of suicide; appears depressed; history of bipolar disorder; issues with rehabilitation program; issues with compliance; appears able to comprehend situation; uses or used alcohol; came on own; walk-in; no place to live (undomiciled); ;     Static: substance abuse/dependance; chronic psychiatric disorder; past suicide attempts; mood issues; mood episodes; past psychiatric disorder; possibly isolated; lack of support and  housing; no known family history;     Modifiable: substance abuse counselling and treatment and need for referral; treatment of psychotic sxs; treat underlying mood issues; address substance dependency issues; address/enhance treatment compliance and supervision; improve interpersonal engagement via groups and therapy; improve support and resources for housing;     Protective: cognitively able to engage in treatment; actvely  seeking help;   Modifiable factors addressed in treatment plan; see summary and interval data for updates    Estimated length of stay based on admission data is 21 days. Estimated discharge date is: 03/26/19.
Risk elements: uses cocaine; uses alcohol; history of schizoaffective illness; past history of suicide; appears depressed; history of bipolar disorder; issues with rehabilitation program; issues with compliance; appears able to comprehend situation; uses or used alcohol; came on own; walk-in; no place to live (undomiciled); ;     Static: substance abuse/dependance; chronic psychiatric disorder; past suicide attempts; mood issues; mood episodes; past psychiatric disorder; possibly isolated; lack of support and  housing; no known family history;     Modifiable: substance abuse counselling and treatment and need for referral; treatment of psychotic sxs; treat underlying mood issues; address substance dependency issues; address/enhance treatment compliance and supervision; improve interpersonal engagement via groups and therapy; improve support and resources for housing;     Protective: cognitively able to engage in treatment; actively  seeking help; has supervision by an ACT team.   Modifiable factors addressed in treatment plan; see summary and interval data for updates    Estimated length of stay based on admission data is 21 days. Estimated discharge date is: 03/26/19.
Risk elements: uses cocaine; uses alcohol; history of schizoaffective illness; past history of suicide; appears depressed; history of bipolar disorder; issues with rehabilitation program; issues with compliance; appears able to comprehend situation; uses or used alcohol; came on own; walk-in; no place to live (undomiciled); ;   On admission risk was HIGH but AH and SI have receded; has had a period of abstinence and plan is for  rehabilitation program so risk now is MODERATE.   Static: substance abuse/dependance; chronic psychiatric disorder; past suicide attempts; mood issues; mood episodes; past psychiatric disorder; possibly isolated; lack of support and  housing; no known family history;     Modifiable: substance abuse counselling and treatment and need for referral; treatment of psychotic sxs; treat underlying mood issues; address substance dependency issues; address/enhance treatment compliance and supervision; improve interpersonal engagement via groups and therapy; improve support and resources for housing;     Protective: cognitively able to engage in treatment; actively  seeking help; has supervision by an ACT team.   Modifiable factors addressed in treatment plan; see summary and interval data for updates    Estimated length of stay based on admission data is 21 days. Estimated discharge date is: 03/26/19.
Risk elements: uses cocaine; uses alcohol; history of schizoaffective illness; past history of suicide; appears depressed; history of bipolar disorder; issues with rehabilitation program; issues with compliance; appears able to comprehend situation; uses or used alcohol; came on own; walk-in; no place to live (undomiciled); ;     Static: substance abuse/dependance; chronic psychiatric disorder; past suicide attempts; mood issues; mood episodes; past psychiatric disorder; possibly isolated; lack of support and  housing; no known family history;     Modifiable: substance abuse counselling and treatment and need for referral; treatment of psychotic sxs; treat underlying mood issues; address substance dependency issues; address/enhance treatment compliance and supervision; improve interpersonal engagement via groups and therapy; improve support and resources for housing;     Protective: cognitively able to engage in treatment; actvely  seeking help;   Modifiable factors addressed in treatment plan; see summary and interval data for updates    Estimated length of stay based on admission data is 21 days. Estimated discharge date is: 03/26/19.
Risk elements: uses cocaine; uses alcohol; history of schizoaffective illness; past history of suicide; appears depressed; history of bipolar disorder; issues with rehabilitation program; issues with compliance; appears able to comprehend situation; uses or used alcohol; came on own; walk-in; no place to live (undomiciled); ;   On admission risk was HIGH but AH and SI have receded; has had a period of abstinence and plan is for  rehabilitation program so risk now is MODERATE.   Static: substance abuse/dependance; chronic psychiatric disorder; past suicide attempts; mood issues; mood episodes; past psychiatric disorder; possibly isolated; lack of support and  housing; no known family history;     Modifiable: substance abuse counselling and treatment and need for referral; treatment of psychotic sxs; treat underlying mood issues; address substance dependency issues; address/enhance treatment compliance and supervision; improve interpersonal engagement via groups and therapy; improve support and resources for housing;     Protective: cognitively able to engage in treatment; actively  seeking help; has supervision by an ACT team.   Modifiable factors addressed in treatment plan; see summary and interval data for updates    Estimated length of stay based on admission data is 21 days. Estimated discharge date is: 03/26/19.
Risk elements: uses cocaine; uses alcohol; history of schizoaffective illness; past history of suicide; appears depressed; history of bipolar disorder; issues with rehabilitation program; issues with compliance; appears able to comprehend situation; uses or used alcohol; came on own; walk-in; no place to live (undomiciled); ;     Static: substance abuse/dependance; chronic psychiatric disorder; past suicide attempts; mood issues; mood episodes; past psychiatric disorder; possibly isolated; lack of support and  housing; no known family history;     Modifiable: substance abuse counselling and treatment and need for referral; treatment of psychotic sxs; treat underlying mood issues; address substance dependency issues; address/enhance treatment compliance and supervision; improve interpersonal engagement via groups and therapy; improve support and resources for housing;     Protective: cognitively able to engage in treatment; actively  seeking help; has supervision by an ACT team.   Modifiable factors addressed in treatment plan; see summary and interval data for updates    Estimated length of stay based on admission data is 21 days. Estimated discharge date is: 03/26/19.
Risk elements: uses cocaine; uses alcohol; history of schizoaffective illness; past history of suicide; appears depressed; history of bipolar disorder; issues with rehabilitation program; issues with compliance; appears able to comprehend situation; uses or used alcohol; came on own; walk-in; no place to live (undomiciled); ;     Static: substance abuse/dependance; chronic psychiatric disorder; past suicide attempts; mood issues; mood episodes; past psychiatric disorder; possibly isolated; lack of support and  housing; no known family history;     Modifiable: substance abuse counselling and treatment and need for referral; treatment of psychotic sxs; treat underlying mood issues; address substance dependency issues; address/enhance treatment compliance and supervision; improve interpersonal engagement via groups and therapy; improve support and resources for housing;     Protective: cognitively able to engage in treatment; actvely  seeking help;   Modifiable factors addressed in treatment plan; see summary and interval data for updates    Estimated length of stay based on admission data is 21 days. Estimated discharge date is: 03/26/19.
Risk elements: uses cocaine; uses alcohol; history of schizoaffective illness; past history of suicide; appears depressed; history of bipolar disorder; issues with rehabilitation program; issues with compliance; appears able to comprehend situation; uses or used alcohol; came on own; walk-in; no place to live (undomiciled); ;     Static: substance abuse/dependance; chronic psychiatric disorder; past suicide attempts; mood issues; mood episodes; past psychiatric disorder; possibly isolated; lack of support and  housing; no known family history;     Modifiable: substance abuse counselling and treatment and need for referral; treatment of psychotic sxs; treat underlying mood issues; address substance dependency issues; address/enhance treatment compliance and supervision; improve interpersonal engagement via groups and therapy; improve support and resources for housing;     Protective: cognitively able to engage in treatment; actively  seeking help; has supervision by an ACT team.   Modifiable factors addressed in treatment plan; see summary and interval data for updates    Estimated length of stay based on admission data is 21 days. Estimated discharge date is: 03/26/19.
Risk elements: uses cocaine; uses alcohol; history of schizoaffective illness; past history of suicide; appears depressed; history of bipolar disorder; issues with rehabilitation program; issues with compliance; appears able to comprehend situation; uses or used alcohol; came on own; walk-in; no place to live (undomiciled); ;     Static: substance abuse/dependance; chronic psychiatric disorder; past suicide attempts; mood issues; mood episodes; past psychiatric disorder; possibly isolated; lack of support and  housing; no known family history;     Modifiable: substance abuse counselling and treatment and need for referral; treatment of psychotic sxs; treat underlying mood issues; address substance dependency issues; address/enhance treatment compliance and supervision; improve interpersonal engagement via groups and therapy; improve support and resources for housing;     Protective: cognitively able to engage in treatment; actively  seeking help; has supervision by an ACT team.   Modifiable factors addressed in treatment plan; see summary and interval data for updates    Estimated length of stay based on admission data is 21 days. Estimated discharge date is: 03/26/19.
Risk elements: uses cocaine; uses alcohol; history of schizoaffective illness; past history of suicide; appears depressed; history of bipolar disorder; issues with rehabilitation program; issues with compliance; appears able to comprehend situation; uses or used alcohol; came on own; walk-in; no place to live (undomiciled); ;   On admission risk was HIGH but AH and SI have receded; has had a period of abstinence and plan is for  rehabilitation program so risk now is MODERATE.   Static: substance abuse/dependance; chronic psychiatric disorder; past suicide attempts; mood issues; mood episodes; past psychiatric disorder; possibly isolated; lack of support and  housing; no known family history;     Modifiable: substance abuse counselling and treatment and need for referral; treatment of psychotic sxs; treat underlying mood issues; address substance dependency issues; address/enhance treatment compliance and supervision; improve interpersonal engagement via groups and therapy; improve support and resources for housing;     Protective: cognitively able to engage in treatment; actively  seeking help; has supervision by an ACT team.   Modifiable factors addressed in treatment plan; see summary and interval data for updates    Estimated length of stay based on admission data is 21 days. Estimated discharge date is: 03/26/19.
Risk elements: uses cocaine; uses alcohol; history of schizoaffective illness; past history of suicide; appears depressed; history of bipolar disorder; issues with rehabilitation program; issues with compliance; appears able to comprehend situation; uses or used alcohol; came on own; walk-in; no place to live (undomiciled); ;     Static: substance abuse/dependance; chronic psychiatric disorder; past suicide attempts; mood issues; mood episodes; past psychiatric disorder; possibly isolated; lack of support and  housing; no known family history;     Modifiable: substance abuse counselling and treatment and need for referral; treatment of psychotic sxs; treat underlying mood issues; address substance dependency issues; address/enhance treatment compliance and supervision; improve interpersonal engagement via groups and therapy; improve support and resources for housing;     Protective: cognitively able to engage in treatment; actvely  seeking help;   Modifiable factors addressed in treatment plan; see summary and interval data for updates    Estimated length of stay based on admission data is 21 days. Estimated discharge date is: 03/26/19.
Risk elements: uses cocaine; uses alcohol; history of schizoaffective illness; past history of suicide; appears depressed; history of bipolar disorder; issues with rehabilitation program; issues with compliance; appears able to comprehend situation; uses or used alcohol; came on own; walk-in; no place to live (undomiciled); ;     Static: substance abuse/dependance; chronic psychiatric disorder; past suicide attempts; mood issues; mood episodes; past psychiatric disorder; possibly isolated; lack of support and  housing; no known family history;     Modifiable: substance abuse counselling and treatment and need for referral; treatment of psychotic sxs; treat underlying mood issues; address substance dependency issues; address/enhance treatment compliance and supervision; improve interpersonal engagement via groups and therapy; improve support and resources for housing;     Protective: cognitively able to engage in treatment; actvely  seeking help;   Modifiable factors addressed in treatment plan; see summary and interval data for updates    Estimated length of stay based on admission data is 21 days. Estimated discharge date is: 03/26/19.
Risk elements: uses cocaine; uses alcohol; history of schizoaffective illness; past history of suicide; appears depressed; history of bipolar disorder; issues with rehabilitation program; issues with compliance; appears able to comprehend situation; uses or used alcohol; came on own; walk-in; no place to live (undomiciled); ;   On admission risk was HIGH but AH and SI have receded; has had a period of abstinence and plan is for  rehabilitation program so risk now is MODERATE.   Static: substance abuse/dependance; chronic psychiatric disorder; past suicide attempts; mood issues; mood episodes; past psychiatric disorder; possibly isolated; lack of support and  housing; no known family history;     Modifiable: substance abuse counselling and treatment and need for referral; treatment of psychotic sxs; treat underlying mood issues; address substance dependency issues; address/enhance treatment compliance and supervision; improve interpersonal engagement via groups and therapy; improve support and resources for housing;     Protective: cognitively able to engage in treatment; actively  seeking help; has supervision by an ACT team.   Modifiable factors addressed in treatment plan; see summary and interval data for updates    Estimated length of stay based on admission data is 21 days. Estimated discharge date is: 03/26/19.
Risk elements: uses cocaine; uses alcohol; history of schizoaffective illness; past history of suicide; appears depressed; history of bipolar disorder; issues with rehabilitation program; issues with compliance; appears able to comprehend situation; uses or used alcohol; came on own; walk-in; no place to live (undomiciled); ;     Static: substance abuse/dependance; chronic psychiatric disorder; past suicide attempts; mood issues; mood episodes; past psychiatric disorder; possibly isolated; lack of support and  housing; no known family history;     Modifiable: substance abuse counselling and treatment and need for referral; treatment of psychotic sxs; treat underlying mood issues; address substance dependency issues; address/enhance treatment compliance and supervision; improve interpersonal engagement via groups and therapy; improve support and resources for housing;     Protective: cognitively able to engage in treatment; actively  seeking help; has supervision by an ACT team.   Modifiable factors addressed in treatment plan; see summary and interval data for updates    Estimated length of stay based on admission data is 21 days. Estimated discharge date is: 03/26/19.

## 2019-04-05 NOTE — PROGRESS NOTE BEHAVIORAL HEALTH - PROBLEM SELECTOR PLAN 2
counselling;  see interval and summary data for updates

## 2019-04-05 NOTE — PROGRESS NOTE BEHAVIORAL HEALTH - PROBLEM SELECTOR PROBLEM 3
Alcohol abuse

## 2019-04-05 NOTE — PROGRESS NOTE BEHAVIORAL HEALTH - NS ED [facility] MED ROS RESPIRATORY
No complaints

## 2019-04-05 NOTE — PROGRESS NOTE BEHAVIORAL HEALTH - PROBLEM SELECTOR PROBLEM 2
Cocaine abuse

## 2019-04-05 NOTE — PROGRESS NOTE BEHAVIORAL HEALTH - NSBHADMITIPOBSFT_PSY_A_CORE
makes needs known
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
## 2019-04-05 NOTE — PROGRESS NOTE BEHAVIORAL HEALTH - NSBHADMITDANGERSELF_PSY_A_CORE
suicidal ideation with plan and means

## 2019-04-05 NOTE — PROGRESS NOTE BEHAVIORAL HEALTH - NSBHADMITIPREASON_PSY_A_CORE
Danger to self; mental illness expected to respond to inpatient care

## 2019-04-05 NOTE — PROGRESS NOTE BEHAVIORAL HEALTH - ABNORMAL MOVEMENTS
No abnormal movements

## 2019-04-05 NOTE — PROGRESS NOTE BEHAVIORAL HEALTH - NSBHFUPTYPE_PSY_A_CORE
Inpatient
Inpatient-On Service Note
Inpatient

## 2019-04-05 NOTE — PROGRESS NOTE BEHAVIORAL HEALTH - NSBHLEGALSTATUS_PSY_A_CORE
9.13 (Voluntary)

## 2019-04-05 NOTE — PROGRESS NOTE BEHAVIORAL HEALTH - PRIMARY DX
Cocaine use with cocaine-induced mood disorder
Schizoaffective disorder, depressive type
Cocaine use with cocaine-induced mood disorder
Cocaine use with cocaine-induced mood disorder
Schizoaffective disorder, depressive type
Schizoaffective disorder, depressive type
Cocaine use with cocaine-induced mood disorder
Cocaine use with cocaine-induced mood disorder
Schizoaffective disorder, depressive type
Cocaine use with cocaine-induced mood disorder
Schizoaffective disorder, depressive type

## 2019-04-05 NOTE — PROGRESS NOTE BEHAVIORAL HEALTH - RELATEDNESS
Fair

## 2019-04-05 NOTE — PROGRESS NOTE BEHAVIORAL HEALTH - AFFECT RANGE
Constricted

## 2019-04-05 NOTE — PROGRESS NOTE BEHAVIORAL HEALTH - NSBHCONSORIP_PSY_A_CORE
Inpatient Admission...

## 2019-04-05 NOTE — PROGRESS NOTE BEHAVIORAL HEALTH - EYE CONTACT
Good

## 2019-04-05 NOTE — PROGRESS NOTE BEHAVIORAL HEALTH - NSBHPTASSESSDT_PSY_A_CORE
01-Apr-2019 08:39
02-Apr-2019 08:11
03-Apr-2019 08:50
04-Apr-2019 08:43
05-Apr-2019 08:12
07-Mar-2019 08:20
07-Mar-2019 11:00
08-Mar-2019 08:30
11-Mar-2019 08:58
12-Mar-2019 08:43
14-Mar-2019 08:41
15-Mar-2019 08:09
19-Mar-2019 08:39
20-Mar-2019 08:17
21-Mar-2019 08:46
22-Mar-2019 08:25
22-Mar-2019 08:25
27-Mar-2019 08:07
28-Mar-2019 08:43
29-Mar-2019 08:47
06-Mar-2019 21:46
13-Mar-2019 08:47
26-Mar-2019 08:05
18-Mar-2019 08:42

## 2019-04-05 NOTE — PROGRESS NOTE BEHAVIORAL HEALTH - NS ED [facility] MSE SPEECH VOLUME
Soft

## 2019-04-05 NOTE — PROGRESS NOTE BEHAVIORAL HEALTH - GAIT / STATION
Other

## 2019-04-05 NOTE — PROGRESS NOTE BEHAVIORAL HEALTH - NSBHFUPINTERVALHXFT_PSY_A_CORE
in day room and later; sits up in bed; avoids eye contact; guarded; poverty of  speech; good adls; anxious disheveled; smiles ; little speech; no s/h i/i/p or avh; fair eye contact; somewhat anxious; no tremor; cog intact

## 2019-04-05 NOTE — PROGRESS NOTE BEHAVIORAL HEALTH - SECONDARY DX1
Alcohol use, unspecified with alcohol-induced mood disorder
Alcohol abuse
Alcohol use, unspecified with alcohol-induced mood disorder
Alcohol abuse
Alcohol use, unspecified with alcohol-induced mood disorder
Alcohol abuse
Alcohol abuse
Alcohol use, unspecified with alcohol-induced mood disorder
Alcohol use, unspecified with alcohol-induced mood disorder
Alcohol abuse
Alcohol use, unspecified with alcohol-induced mood disorder
Alcohol abuse

## 2019-04-05 NOTE — PROGRESS NOTE BEHAVIORAL HEALTH - NSBHADMITIPOBS_PSY_A_CORE
Routine observation

## 2019-04-05 NOTE — PROGRESS NOTE BEHAVIORAL HEALTH - SUMMARY
37 year old F with complicated past psychiatric history (differential diagnoses include MARTIN, MDD with psychotic fx, PTSD, Borderline Personality D/O), history of multiple psychiatric admissions including [facility] [address] in December 2018 and [facility] 1/7/19-2/6/19, who currently presents to [facility] ED c/o auditory hallucinations with suicidal ideation. She states the voices are telling her to “kill myself, or sacrifice myself or someone else to make me better”. The pt denies any history of homicidal ideation, although she does have a history of suicidal ideation for which she was hospitalized. The pt reports a current plan of overdosing on the bisoprolol and muscle relaxant she has at home. The pt reports she was recently discharged from [facility], where she was hospitalized from 1/7/2019 to 2/6/2019, which she states ‘did nothing for me’. She reports she was started on Abilify and buspirone while at [facility], which she feels did not help her. She has had a previous admission here at [facility] inpatient psych unit from 12/6/18 – 12/31/18 under Dr. De La Torre, where she was started on Klonopin and Risperdal. The pt reports she was not able to follow up with outpatient psychiatry because she became sick from Meniere’s Dz and could not make it to her appointment; she did not reschedule. The pt states she feels the Klonopin and Risperdal were helpful for her sx and would like to be back on that regimen. The pt denies any recent use of tobacco, alcohol, or illicit drugs."    ;;3/18: states that her thoughts bother her and may be telling her to hurt self; very somatically preoccupied; accepting dose increase of Haldol to 7.5mg at night for delusional thinking with Klonopin 1mg po bid for anxiety; attempting to find appropraite f/u after attempt to send to [facility] was unsuccessful with transportation being an issue.    ;;3/19: able to discuss difficulties with past aftercare referral with transportation and timely arrival at appointment.  No somatic complaints today.  Dis...attempts to  switch to ZAMORA.  lower Klonopin to 0.5m mg po bid to reduce daytime sedation.  Begin re-transition to aftercare.  ;;3/29: wants to cut back on Benadryl; no complaints about lowering Klonopin;  aware that accepted by ACT team; no complaints today;.  MSE: in bed; avoids eye contact; little speech; somewhat skeptical but aware of ACT team; fair eye contact; no s/h i/i/p or avh;.  continue   with lower dose of Klonopin and continue Haldol at 10mg daily for somatic delusional thinking and disorganization  and continue to encourage use of ZAMORA prior to d/c.    ;;4/1: wants a a repeat prolactin level; no sleep appetite or pain complaints and no (new) somatic issues;.   MSE: in bed; avoids eye contact; little speech; somewhat skeptical but aware of ACT team; fair eye contact; no s/h i/i/p or avh;.  continue   with lower dose of Klonopin and continue Haldol at 10mg daily for somatic delusional thinking and disorganization  and continue to encourage use of ZAMORA prior to d/c.    ;;4/2: "So nothing is going to change?"  awaiting ACT team assignment prior to d/c.  anticipating new Prolactin level; no sleep appetite or pain complaints;.  eating lunch in room; fair eye contact; fair ADLs; no s/h i/i/p or avh; less somatically preoccupied; not labile or irritable as in the past; no tremor; somewhat guarded. ;.  continue current regime Klonopin at lower dose 0.5mg po bid and Haldol 10mg po daily; continue to inquire about consent for ZAMORA.  await ACT team assignment.      ;;4/3: submitted and then rescinded 72 hour letter; anxious to leave so she can see an GYN doctor; aware of ACT team involvement; likely d/c on 4/8.;.  similiar to previous presentations;   MSE: in bed; avoids eye contact; little speech; somewhat skeptical but aware of ACT team; fair eye contact; no s/h i/i/p or avh;.  continue   with lower dose of Klonopin and continue Haldol at 10mg daily for somatic delusional thinking and disorganization  and continue to review use of ZAMORA prior to d/c.      ;;4/4: rescinded 72 hour letter and decided to skip am Klonopin 0.5mg as patient fears am sedation; anticipates leaving on Dore[place]ay 4/8 with ACT tream involvement.  ;.  more visible; some psychomotor retardation;  speech moderate volumn and spontaneous;  alert; oriented; no tremor; normal gait;  anxious and somatically preoccupied but much less labile and not irritable.  somewhat sad but no s/h i/i/p or avh.  ;.  continue Haldol 10mg po daily and Klonopin 0.5mg po bid but monitor response to patient's self-tapering.  d/c in 4/8 if stable and arrangements with ACT in place.   ;;4/5: ":Can we decrese the Haldol?"  ;  relatively calm today ; expects discharge on Mondary; no complaints;.  in day room and later; sits up in bed; avoids eye contact; guarded; poverty of  speech; good adls; anxious;.  patient does not accept ZAMORA at this time;  encouraged to reconsider. 52yo  M, PPHx of cocaine, heroin, EtOH, and cannabis use disorders and self reported Bipolar most recent episode depression, who was BIB self due to SI to jump in front of train in context of cocaine and alcohol relapse, similar to recent psychiatric admission in Nov 2018.  Pt. admits to relapse on crack/cocaine and EtOH earlier today.  He is not clear as to why he was not able to follow up with outpatient providers.  He states he feels safe in the hospital right now, but should he return to the streets in his current state he fears for his life as his depression is so severe he will end his life.  He states he is currently hearing voices telling him to "jump in front of a train." He denies VH. He denies HI.   He reports his mood as "depressed" and endorses difficulty with appetite, sleep, energy, and concentration.  Discussed with pt was the use of ZAMORA given his hx of poor compliance and pt stated he would accept an injection prior to his discharge.  Pt. reports to have been drinking earlier today, he denies any previous episodes of withdrawal seizure and states he has only experienced "shakes" in the past.    ;;3/6: avoidant; under the covers; smiles strangely much like preivous admissions; in past  has had AH as above; resuming Risperdal titratration with likely transition to ZAMORA.  Review community support and may need enhanced community support in view of frequent readmissions.      ;;3/7: stays in bed; minimal responses; indicates he has AH and SI but mostly smiles silently;.  in bed little eye contact; minimal speech; internally preoccupied; inapprpriate affect; staff reports refusal to allow routine procedures such as vital signs; however compliant;.  continue Risperal titration and encourage socialization and compliant with routine procedures.   ;;3/8: minimal responses; lying in bed eyes closed while awake; smiles inidcates he is okay but endorses SI and AH vaguely;.   in bed little eye contact; minimal speech; internally preoccupied; inapprpriate a...om; no s/h i/i/p denies AVH; anticipates rehab; little speech; some blocking;.  awaiting rehab continue Risperdal for psychosis.   ...  ;;3/29: continues to endorse wish to go to rehab but not conversations; no complaints;.  sits up in bed and smiles at the writer but no or minimal speech; no tremor; internally preoccupied but denies avh or s/h i/i/p;.  continue Risperdal for psychosis; awaits rehab.   ;;3/29: has had a PPD before and agrees to a PPD prior to rehab;  staff notes improved mood; no sleep appetite or pain compalints;.  smiling in bed; minimal verbalizations; fair ads ; no s/h i/i/p or avh but impoverished thinking; cog intact; oriented; no tremor;.  continue Risperdal regime; PPD; for rehab.    ;;4/1: sleep appetite ok; no complaints;.  PPD is negative;  staff notes improved mood; no sleep appetite or pain compalints;.  smiling in bed; minimal verbalizations; fair ads ; no s/h i/i/p or avh but impoverished thinking; cog intact; oriented; no tremor;.  continue Risperdal regime  ;;4/2:  sleep appetite ok; interviewed for [facility] program for rehab; no sleep appetite or pain compalints;.  quietyly smiling in bed; minimal verbalizations; fair ads ; no s/h i/i/p or avh but impoverished thinking; cog intact; oriented; no tremor;.  continue Risperdal regime; new labs cbc cmp prior to likely acceptance at [facility].   ;;4/4: in bed not converastional ; no complaints ;.   smiling quietly; constricted affect; internalluy preoccupied but denies s/h i/i/p or avh; anticipating going to rehab; adls fair; avoids eye contact;.  continues to maintain progress but still thinking is improverished;  hgb 11.6; no change in cbc or cmp from prior measurements.   ;;4/5: getting a shave in his room;  smiling; says little ;no complaints;.  disheveled; smiles ; little speech; no s/h i/i/p or avh; fair eye contact; somewhat anxious; no tremor; cog intact ;.  discharge to rehabilitation on Risperdal 4mg at night for hallucinations and mood.

## 2019-04-05 NOTE — PROGRESS NOTE BEHAVIORAL HEALTH - NSBHATTESTSEENBY_PSY_A_CORE
attending Psychiatrist without NP/Trainee

## 2019-04-05 NOTE — PROGRESS NOTE BEHAVIORAL HEALTH - ESTIMATED INTELLIGENCE
Below Average

## 2019-04-05 NOTE — PROGRESS NOTE BEHAVIORAL HEALTH - NS ED BHA AXIS I PRIMARY CODE FT
F14.94
F25.1
F14.94
F25.1
F25.1
F14.94
F14.94
F25.1
F14.94
F25.1

## 2019-04-05 NOTE — PROGRESS NOTE BEHAVIORAL HEALTH - AFFECT CONGRUENCE
Congruent

## 2019-04-05 NOTE — PROGRESS NOTE BEHAVIORAL HEALTH - NSBHFUPINTERVALCCFT_PSY_A_CORE
":Can we decrese the Haldol?"  ;  relatively calm today ; expects discharge on Mondary; no complaints getting a shave in his room;  smiling; says little ;no complaints

## 2019-04-05 NOTE — PROGRESS NOTE BEHAVIORAL HEALTH - BEHAVIOR
Uncooperative

## 2019-04-05 NOTE — PROGRESS NOTE BEHAVIORAL HEALTH - SECONDARY DX2
Cannabis abuse
Cocaine abuse
Cannabis abuse
Cocaine abuse
Cocaine abuse
Cannabis abuse
Cannabis abuse
Cocaine abuse
Cannabis abuse
Cocaine abuse

## 2019-04-05 NOTE — PROGRESS NOTE BEHAVIORAL HEALTH - NSBHFUPSUICINTERVAL_PSY_A_CORE
none known

## 2019-04-08 DIAGNOSIS — R45.851 SUICIDAL IDEATIONS: ICD-10-CM

## 2019-04-08 DIAGNOSIS — F17.210 NICOTINE DEPENDENCE, CIGARETTES, UNCOMPLICATED: ICD-10-CM

## 2019-04-08 DIAGNOSIS — Z91.013 ALLERGY TO SEAFOOD: ICD-10-CM

## 2019-04-08 DIAGNOSIS — Z87.828 PERSONAL HISTORY OF OTHER (HEALED) PHYSICAL INJURY AND TRAUMA: ICD-10-CM

## 2019-04-08 DIAGNOSIS — Z79.899 OTHER LONG TERM (CURRENT) DRUG THERAPY: ICD-10-CM

## 2019-04-08 DIAGNOSIS — Z91.19 PATIENT'S NONCOMPLIANCE WITH OTHER MEDICAL TREATMENT AND REGIMEN: ICD-10-CM

## 2019-04-08 DIAGNOSIS — F13.10 SEDATIVE, HYPNOTIC OR ANXIOLYTIC ABUSE, UNCOMPLICATED: ICD-10-CM

## 2019-04-08 DIAGNOSIS — Z59.0 HOMELESSNESS: ICD-10-CM

## 2019-04-08 DIAGNOSIS — G83.24 MONOPLEGIA OF UPPER LIMB AFFECTING LEFT NONDOMINANT SIDE: ICD-10-CM

## 2019-04-08 DIAGNOSIS — F12.10 CANNABIS ABUSE, UNCOMPLICATED: ICD-10-CM

## 2019-04-08 DIAGNOSIS — F14.14 COCAINE ABUSE WITH COCAINE-INDUCED MOOD DISORDER: ICD-10-CM

## 2019-04-08 DIAGNOSIS — F10.14 ALCOHOL ABUSE WITH ALCOHOL-INDUCED MOOD DISORDER: ICD-10-CM

## 2019-04-08 DIAGNOSIS — F25.1 SCHIZOAFFECTIVE DISORDER, DEPRESSIVE TYPE: ICD-10-CM

## 2019-04-08 DIAGNOSIS — Z59.9 PROBLEM RELATED TO HOUSING AND ECONOMIC CIRCUMSTANCES, UNSPECIFIED: ICD-10-CM

## 2019-04-08 SDOH — ECONOMIC STABILITY - INCOME SECURITY: PROBLEM RELATED TO HOUSING AND ECONOMIC CIRCUMSTANCES, UNSPECIFIED: Z59.9

## 2019-04-08 SDOH — ECONOMIC STABILITY - HOUSING INSECURITY: HOMELESSNESS: Z59.0

## 2019-07-16 NOTE — ED ADULT NURSE NOTE - CAS TRG GEN SKIN COLOR
1. Continue Omeprazole 40 mg daily from now on, x 3 months. Then take Omeprazole 20 mg daily x 1 month. Then every other day x 1 month, then stop.     2. Call the office at any time when switching to a lower dose of Omeprazole, if there are increase in symptoms.    3. Strict GERD diet listed bellow.        Gastroesophageal Reflux Diet (GERD)    This guide has been prepared for your use by registered dietitians. If you have questions or concerns, please call the nearest [place] facility to contact a dietitian. Diet counseling is available to discuss your specific needs.    Why follow a diet for gastroesophageal reflux?  This diet, along with prescribed medication, should help prevent uncomfortable side effects, such as heartburn.     Important points to keep in mind  • Stop smoking   • Wear loose fitting clothes  • Achieve and maintain a healthy weight  • East small frequent meals   • Sit or  an upright position during and for 45 to 60 minutes after eating   • Try problem foods in small amounts as part of a meal  • Avoid eating within 2 to 3 hours before bedtime   • Raise the head of the bed 6-8 inches when sleeping Foods to limit or avoid  • High-fat foods   • Alcohol  • Carbonated beverages   • Chocolate   • Citrus juices   • Coffee and caffeinated beverages   • Tomato products        FOOD GROUPS  USUALLY WELL TOLERATED  MAY CAUSE DISCOMFORT  TIPS      BREADS, CEREALS, RICE and PASTA  5-8 ounce equivalents per day     1 ounce equivalent =   1 slice of bread   1 cup ready-to-eat cereal  ½ cup cooked cereal, rice, pasta  ½ bagel, bun, English muffin Plain (with or without whole grain flour) bread, rolls, crackers, cereals, rice, Barely, and plain pastas; pasta with low-fat cream sauce   Cuban toast, muffins, biscuits, pancakes and waffles made with low-fat ingredients; bagels; corn tortillas   Fat-free crackers Breads and cereals made with high fat ingredients such as croissants, doughnuts, sweet rolls,  muffins, biscuits and granola type cereals   Pasta served with cream sauces and tomato based sauces   High fat snacks Spread jellies and jams on breads instead of butter or margarine  Sprinkle low-fat cheese, such as part-skim ricotta or mozzarella, on pasta in place of cream or tomato sauce      VEGETABLES  2-3 cups per day  Fresh, frozen or canned vegetables   Baked, boiled and mashed potatoes without added fat fried or creamed vegetables, tomatoes and tomato products, onion, vegetable juices  Chinese fried potatoes, potato chips Cook vegetables in broth or sprinkle with herbs to add flavor     FRUITS  1 ½ -2 cups per day    Fresh, frozen and canned fruits as tolerated   Fruit juices as tolerated  Reyes, grapefruit, oranges, pineapples, and tangerines   Citrus juices Include other sources of vitamin C, such as cantaloupe, potatoes and strawberries     MILK, YOGURT and CHEESE  3 cups per day     1 cup=   1 cup milk or yogurt  1 ½ oz. natural cheese   2 oz. processed cheese  Fat-free, low-fat and reduced fat milk, low-fat buttermilk    Low-fat and nonfat yogurt    Low-fat cheeses, cottage cheese  Whole milk, buttermilk made with whole milk, chocolate milk, chocolate shakes or drinks     Evaporated whole milk and cream     Regular cheeses  In recipes that call for higher fat items, such as whole milk or cream, replace with skim milk or low-fat cottage cheese     MEATS, FISH, POULTRY, DRY BEANS & PEAS, EGGS, & NUTS  5-7 ounce equivalents per day    1ounce =   1oz. cooked meat, poultry or fish  1 egg   ¼ cup cooked dried beans   1Tbsp peanut butter  ½ oz. nuts or seeds  Lean beef, pork, lamb, veal, and poultry(without the skin): All fresh, frozen or canned fish packed in water; shellfish    Low-fat luncheon meats    Eggs(limit to 3-4 egg yolks weekly)    Dry beans and peas prepared without fat(includes fat-free refried beans)    Reduced fat peanut butter    tofu All fried, fatty, or heavily marbled meat, poultry or  fish    Regular luncheon meats, including bologna, salami, pimento loaf; sausages, wieners    Dry beans and peas prepared with fat or high-fat meat; refried beans     Nuts and peanut butter Broil, roast, grill, or boil meats, poultry and fish instead of frying     Select or prepare meats in their natural juice instead of sauces or gravies.      FATS, SNACKS, SWEETS, CONDIMENTS and BEVERAGES   Use sparingly  Nonfat or low-fat dressings and mayonnaise; nonfat liquid or powdered cream substitutes; nonfat or reduced fat sour cream   Soups made with a vegetable broth base, lean meat, vegetables (except tomatoes) and low fat milk  Sherbet, fruit ice, gelatin, eden food cake, jane crackers, low-fat cookies, frozen yogurt, reduced fat ice cream, pudding, or baked custard made with low-fat milk or other low-fat milk and other low-fat or nonfat desserts  Sugar, honey, jams, jellies, molasses, syrups, hard candy and marshmallows  Decaffeinated coffee, non-mint tea  Salt, pepper, garlic, oregano, daryn, other spices and herbs.  Regular salad dressings, butter, margarine, oil, castle, gravy, regular sour cream, cream cheese  Regular cream and tomato-based soups  High fat snacks, such as chips and buttered popcorn  All other cakes, cookies, pies, ice cream, pastries, and doughnuts  Any deserts containing chocolate  Coconut, chocolate or cream-filled candy  Candy with nuts   Carbonated beverages, regular coffee, mint tea, and alcoholic beverages  Tomato-based sauces  Spearmint, peppermint, chili and jalapeno peppers, and vinegar  Sprinkle seasonings, such as garlic, onion powder, or oregano on cooked in place of butter or margarine   Snack on fresh fruit instead of chips or cookies.      A registered dietitian can help  For a list of [place] facilities with a dietitian, please call [facility] toll free at [phone]            This information presented is intended for general information and educational purposes. It is  not intended to replace the advice of your health care provider. Contact your health care provider if you believe you have a health problem. [facility] is a not-for-profit health care provider and a national leader in efforts to improve the quality of health care.          Normal for race

## 2019-07-17 ENCOUNTER — EMERGENCY (EMERGENCY)
Facility: HOSPITAL | Age: 54
LOS: 1 days | Discharge: HOME | End: 2019-07-17
Attending: EMERGENCY MEDICINE | Admitting: EMERGENCY MEDICINE
Payer: MEDICAID

## 2019-07-17 VITALS
HEIGHT: 64 IN | HEART RATE: 74 BPM | WEIGHT: 151.02 LBS | OXYGEN SATURATION: 100 % | DIASTOLIC BLOOD PRESSURE: 85 MMHG | SYSTOLIC BLOOD PRESSURE: 132 MMHG | TEMPERATURE: 98 F | RESPIRATION RATE: 16 BRPM

## 2019-07-17 DIAGNOSIS — F60.2 ANTISOCIAL PERSONALITY DISORDER: ICD-10-CM

## 2019-07-17 DIAGNOSIS — Z78.9 OTHER SPECIFIED HEALTH STATUS: ICD-10-CM

## 2019-07-17 DIAGNOSIS — F14.20 COCAINE DEPENDENCE, UNCOMPLICATED: ICD-10-CM

## 2019-07-17 DIAGNOSIS — F19.94 OTHER PSYCHOACTIVE SUBSTANCE USE, UNSPECIFIED WITH PSYCHOACTIVE SUBSTANCE-INDUCED MOOD DISORDER: ICD-10-CM

## 2019-07-17 DIAGNOSIS — Z76.5 MALINGERER [CONSCIOUS SIMULATION]: ICD-10-CM

## 2019-07-17 LAB
ALBUMIN SERPL ELPH-MCNC: 4.1 G/DL — SIGNIFICANT CHANGE UP (ref 3.3–5)
ALP SERPL-CCNC: 96 U/L — SIGNIFICANT CHANGE UP (ref 40–120)
ALT FLD-CCNC: 7 U/L — LOW (ref 10–45)
ANION GAP SERPL CALC-SCNC: 8 MMOL/L — SIGNIFICANT CHANGE UP (ref 5–17)
AST SERPL-CCNC: 11 U/L — SIGNIFICANT CHANGE UP (ref 10–40)
BASOPHILS # BLD AUTO: 0.02 K/UL — SIGNIFICANT CHANGE UP (ref 0–0.2)
BASOPHILS NFR BLD AUTO: 0.3 % — SIGNIFICANT CHANGE UP (ref 0–2)
BILIRUB SERPL-MCNC: 0.2 MG/DL — SIGNIFICANT CHANGE UP (ref 0.2–1.2)
BUN SERPL-MCNC: 14 MG/DL — SIGNIFICANT CHANGE UP (ref 7–23)
CALCIUM SERPL-MCNC: 9 MG/DL — SIGNIFICANT CHANGE UP (ref 8.4–10.5)
CHLORIDE SERPL-SCNC: 108 MMOL/L — SIGNIFICANT CHANGE UP (ref 96–108)
CO2 SERPL-SCNC: 27 MMOL/L — SIGNIFICANT CHANGE UP (ref 22–31)
CREAT SERPL-MCNC: 0.91 MG/DL — SIGNIFICANT CHANGE UP (ref 0.5–1.3)
EOSINOPHIL # BLD AUTO: 0.39 K/UL — SIGNIFICANT CHANGE UP (ref 0–0.5)
EOSINOPHIL NFR BLD AUTO: 5.3 % — SIGNIFICANT CHANGE UP (ref 0–6)
ETHANOL SERPL-MCNC: <10 MG/DL — SIGNIFICANT CHANGE UP (ref 0–10)
GLUCOSE SERPL-MCNC: 100 MG/DL — HIGH (ref 70–99)
HCT VFR BLD CALC: 40.3 % — SIGNIFICANT CHANGE UP (ref 39–50)
HGB BLD-MCNC: 12.7 G/DL — LOW (ref 13–17)
IMM GRANULOCYTES NFR BLD AUTO: 0.1 % — SIGNIFICANT CHANGE UP (ref 0–1.5)
LYMPHOCYTES # BLD AUTO: 1.77 K/UL — SIGNIFICANT CHANGE UP (ref 1–3.3)
LYMPHOCYTES # BLD AUTO: 24 % — SIGNIFICANT CHANGE UP (ref 13–44)
MCHC RBC-ENTMCNC: 27.2 PG — SIGNIFICANT CHANGE UP (ref 27–34)
MCHC RBC-ENTMCNC: 31.5 GM/DL — LOW (ref 32–36)
MCV RBC AUTO: 86.3 FL — SIGNIFICANT CHANGE UP (ref 80–100)
MONOCYTES # BLD AUTO: 0.46 K/UL — SIGNIFICANT CHANGE UP (ref 0–0.9)
MONOCYTES NFR BLD AUTO: 6.2 % — SIGNIFICANT CHANGE UP (ref 2–14)
NEUTROPHILS # BLD AUTO: 4.72 K/UL — SIGNIFICANT CHANGE UP (ref 1.8–7.4)
NEUTROPHILS NFR BLD AUTO: 64.1 % — SIGNIFICANT CHANGE UP (ref 43–77)
NRBC # BLD: 0 /100 WBCS — SIGNIFICANT CHANGE UP (ref 0–0)
PLATELET # BLD AUTO: 349 K/UL — SIGNIFICANT CHANGE UP (ref 150–400)
POTASSIUM SERPL-MCNC: 4.4 MMOL/L — SIGNIFICANT CHANGE UP (ref 3.5–5.3)
POTASSIUM SERPL-SCNC: 4.4 MMOL/L — SIGNIFICANT CHANGE UP (ref 3.5–5.3)
PROT SERPL-MCNC: 7.2 G/DL — SIGNIFICANT CHANGE UP (ref 6–8.3)
RBC # BLD: 4.67 M/UL — SIGNIFICANT CHANGE UP (ref 4.2–5.8)
RBC # FLD: 14.9 % — HIGH (ref 10.3–14.5)
SODIUM SERPL-SCNC: 143 MMOL/L — SIGNIFICANT CHANGE UP (ref 135–145)
WBC # BLD: 7.37 K/UL — SIGNIFICANT CHANGE UP (ref 3.8–10.5)
WBC # FLD AUTO: 7.37 K/UL — SIGNIFICANT CHANGE UP (ref 3.8–10.5)

## 2019-07-17 PROCEDURE — 80053 COMPREHEN METABOLIC PANEL: CPT

## 2019-07-17 PROCEDURE — 90792 PSYCH DIAG EVAL W/MED SRVCS: CPT

## 2019-07-17 PROCEDURE — 85025 COMPLETE CBC W/AUTO DIFF WBC: CPT

## 2019-07-17 PROCEDURE — 99284 EMERGENCY DEPT VISIT MOD MDM: CPT

## 2019-07-17 PROCEDURE — 80307 DRUG TEST PRSMV CHEM ANLYZR: CPT

## 2019-07-17 PROCEDURE — 99285 EMERGENCY DEPT VISIT HI MDM: CPT | Mod: 25

## 2019-07-17 PROCEDURE — 99053 MED SERV 10PM-8AM 24 HR FAC: CPT

## 2019-07-17 PROCEDURE — 36415 COLL VENOUS BLD VENIPUNCTURE: CPT
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
## 2019-07-17 NOTE — ED PROVIDER NOTE - OBJECTIVE STATEMENT
52 yo male with hx of substance abuse bipolar disorder schizoaffective with SI  x 3 days no specific plan- hearing voices to hurt himself- last used cocaine 1 week ago  no etoh use - no smoking  no sob or chest pain  no N/V no diaphoresis last admission to [facility] was within the past year

## 2019-07-17 NOTE — ED PROVIDER NOTE - CARE PLAN
Principal Discharge DX:	Suicidal ideation  Secondary Diagnosis:	Bipolar 1 disorder, mixed  Secondary Diagnosis:	Schizophrenia Principal Discharge DX:	Suicidal ideation  Secondary Diagnosis:	Bipolar 1 disorder, mixed  Secondary Diagnosis:	Schizophrenia  Secondary Diagnosis:	Substance abuse

## 2019-07-17 NOTE — ED BEHAVIORAL HEALTH ASSESSMENT NOTE - RISK ASSESSMENT
Patient is at chronic moderate risk due to ongoing substance use and low motivation to stop. He presents future oriented and denies any active SI.

## 2019-07-17 NOTE — ED BEHAVIORAL HEALTH ASSESSMENT NOTE - SUMMARY
54 yo male with hx of malingering and crack cocaine/ alcohol/benzos/opiate use, multiple admission to rehab and inpatient psychiatry, chronically nonadherent with treatment, last admission at [facility] in April 2019, presenting to the [facility] ED reporting SI for 3 days no specific plan, hearing voices to hurt himself. Patient presents vague and inconsistent (with information about his substance use and recent admissions). He presents calm, cooperative, focused on getting food and lodging in the hospital. He does not appear to be depressed, he is organized, goal directed and does not show sx of being internally preoccupied. He is future oriented. Patient is actively using drugs, is not interested in stopping or getting any substance abuse treatment. His prior behavior shows that he is also not motivated in getting psychiatric treatment and would not benefit from another inpatient admission. His current presentation is highly suspicious of malingering with the goal of staying in the hospital.   Plan:  -patient does not benefit from an inpatient psychiatric admission at this time  -discussed with the patient the impact on using substances on his mood and options for treatment; patient refused the referral information that the writer provided and requested another tray of food before he leaves the hospital 54 yo male with hx of malingering and crack cocaine/ alcohol/benzos/opiate use, multiple admission to rehab and inpatient psychiatry, chronically nonadherent with treatment, last admission at [facility] in April 2019, presenting to the [facility] ED reporting SI for 3 days no specific plan, hearing voices to hurt himself. Patient presents vague and inconsistent (with information about his substance use and recent admissions). He presents calm, cooperative, focused on getting food and lodging in the hospital. He does not appear to be depressed, he is organized, goal directed and does not show sx of being internally preoccupied. He is future oriented. Patient is actively using drugs, is not interested in stopping or getting any substance abuse treatment. His prior behavior shows that he is also not motivated in getting psychiatric treatment and would not benefit from another inpatient admission. His current presentation is highly suspicious of malingering with the goal of staying in the hospital.   Plan:  -patient does not benefit from another inpatient psychiatric admission at this time  -discussed with the patient the impact on using substances on his mood and options for treatment; patient refused the referral information that the writer provided and requested another tray of food before he leaves the hospital; he prefers to go to another hospital to be admitted  -patient to be discharged after he receives his second breakfast

## 2019-07-17 NOTE — ED PROVIDER NOTE - CLINICAL SUMMARY MEDICAL DECISION MAKING FREE TEXT BOX
52 yo male with bipolar disorder schizoaffective with SI  x 3 days no plan  await labs etoh tox and psych  hx of substance abuse 1 week ago - cocaine  no chest pain or sob  no ekg changes  compliant with meds

## 2019-07-17 NOTE — ED BEHAVIORAL HEALTH ASSESSMENT NOTE - DESCRIPTION (FIRST USE, LAST USE, QUANTITY, FREQUENCY, DURATION)
unclear "a bit", a couple of days ago past charts indicate use crack cocaine, chronic use, last time a couple of days ago- did not give Utox

## 2019-07-17 NOTE — ED BEHAVIORAL HEALTH ASSESSMENT NOTE - DIFFERENTIAL
polysubstance (cocaine/heroin/etoh/cannabis/benzo) use d/o, susbtance induced mood d/o & psychosis vs. bipolar or schizoaffective. malingering vs polysubstance (cocaine/heroin/etoh/cannabis/benzo) use d/o, susbtance induced mood d/o

## 2019-07-17 NOTE — ED BEHAVIORAL HEALTH ASSESSMENT NOTE - OTHER PAST PSYCHIATRIC HISTORY (INCLUDE DETAILS REGARDING ONSET, COURSE OF ILLNESS, INPATIENT/OUTPATIENT TREATMENT)
Multiple admissions to inpatient psychiatry with similar presentation, highly suspicious for malingering/antisocial and substance induced mood/psychotic symptoms  inconsistent with his reported dg of schizophrenia/bipolar.  Chronically nonadherent with outpatient psychiatric and substance abuse treatment (stopped following up including when he was put on ZAMORA)

## 2019-07-17 NOTE — ED ADULT NURSE REASSESSMENT NOTE - NS ED NURSE REASSESS COMMENT FT1
Patient refusing discharge vital signs, states, "For what? I don't want them." Denies any new complaints, MD Glatter aware.

## 2019-07-17 NOTE — ED ADULT NURSE NOTE - NSIMPLEMENTINTERV_GEN_ALL_ED
Implemented All Fall Risk Interventions:  [place] to call system. Call bell, personal items and telephone within reach. Instruct patient to call for assistance. Room bathroom lighting operational. Non-slip footwear when patient is off stretcher. Physically safe environment: no spills, clutter or unnecessary equipment. Stretcher in lowest position, wheels locked, appropriate side rails in place. Provide visual cue, wrist band, yellow gown, etc. Monitor gait and stability. Monitor for mental status changes and reorient to person, place, and time. Review medications for side effects contributing to fall risk. Reinforce activity limits and safety measures with patient and family.

## 2019-07-17 NOTE — ED BEHAVIORAL HEALTH ASSESSMENT NOTE - DETAILS
reports vague auditory hallucinations telling him to hurt himself, no plan reports vague hallucinations, does not appear internally preoccupied d/w dr Viera

## 2019-07-17 NOTE — ED ADULT NURSE NOTE - OBJECTIVE STATEMENT
suicidal thoughts for couple of weeks due to depression, as per pt he's not safe on his living arrangement because he live and sleep in the street suicidal thoughts for couple of weeks due to depression, as per pt he's not safe on his living arrangement because he live and sleep in the street.

## 2019-07-17 NOTE — ED BEHAVIORAL HEALTH ASSESSMENT NOTE - HPI (INCLUDE ILLNESS QUALITY, SEVERITY, DURATION, TIMING, CONTEXT, MODIFYING FACTORS, ASSOCIATED SIGNS AND SYMPTOMS)
54 yo male with hx of crack cocaine/ alcohol/benzos/opiate use, multiple admission to rehab and inpatient psychiatry, chronically nonadherent with treatment, last admission at [facility] in April 2019, presenting to the [facility] ED reporting SI for 3 days no specific plan- hearing voices to hurt himself, initially reporting last use of crack 1 week ago and denying any etoh use. Upon interview patient presents calm, cooperative, sleeping comfortably after finishing his food tray.  Patient is a very inconsistent historian. He initially states that he hasn't been to any hospital or rehab since he left [facility], then rectifies his statements and reports recent admission to rehab (does not want to state the name). He reports that he left the rehab as he did not like the program . He reports that after he was discharged from [facility] he stopped taking his treatment and restarted using crack cocaine. He denies any mood or psychotic symptoms from April until 3 days ago. He cannot identify any precipitating factors for his reoccurrence of symptoms. He reports CAH to hurt himself, however he denies an plan and presents future oriented. He insists on being admitted to the hospital stating that if he's admitted he promises to take his treatment and follow up outpatient (he made an identical statement during his prior admission). Although he initially reported no drug or etoh use since 1 week ago, he now admits use of crack and alcohol 1-2 days ago. 54 yo male with hx of malingering and crack cocaine/ alcohol/benzos/opiate use, multiple admission to rehab and inpatient psychiatry, chronically nonadherent with treatment, last admission at [facility] in April 2019, presenting to the [facility] ED reporting SI for 3 days no specific plan- hearing voices to hurt himself, initially reporting last use of crack 1 week ago and denying any etoh use. Upon interview patient presents calm, cooperative, sleeping comfortably after finishing his food tray.  Patient is a very inconsistent historian. He initially states that he hasn't been to any hospital or rehab since he left [facility], then rectifies his statements and reports recent admission to rehab (does not want to state the name). He reports that he left the rehab as he did not like the program . He reports that after he was discharged from [facility] he stopped taking his treatment and restarted using crack cocaine. He denies any mood or psychotic symptoms from April until 3 days ago. He cannot identify any precipitating factors for his reoccurrence of symptoms. He reports CAH to hurt himself, however he denies an plan and presents future oriented. He insists on being admitted to the hospital stating that if he's admitted he promises to take his treatment and follow up outpatient (he made an identical statement during his prior admission). Although he initially reported no drug or etoh use since 1 week ago, he now admits use of crack and alcohol 1-2 days ago. 54 yo male with hx of malingering and crack cocaine/ alcohol/benzos/opiate use, multiple admission to rehab and inpatient psychiatry, chronically nonadherent with treatment, last admission at [facility] in April 2019, presenting to the [facility] ED reporting SI for 3 days no specific plan- hearing voices to hurt himself. Patient initially reported last use of crack 1 week ago and denied any etoh use, later contradicted his statement.  Upon interview patient presents calm, cooperative, sleeping comfortably after finishing his food tray. Patient is a very inconsistent historian. He initially states that he hasn't been to any hospital or rehab since he left [facility], then rectifies his statements and reports recent admission to rehab (does not want to state the name; left the program because he didn't like it) . Patient reports that after he was discharged from [facility], he stopped taking his treatment and restarted using crack cocaine and alcohol. He denies any mood or psychotic symptoms from April until 3 days ago. He cannot identify any precipitating factors for his reoccurrence of symptoms. He reports CAH to hurt himself, however he denies an plan and presents future oriented. He insists on being admitted to the hospital stating that if he's admitted he promises to take his treatment and follow up outpatient (he made an identical statement during his prior admission). Although he initially reported no drug or etoh use since 1 week ago, he now admits use of crack and alcohol 1-2 days ago.    The writer reviewed the chart, including patient's last admission and reached out to Nurse Manager Laura Solano. The chart review shows that patient 52 yo male with hx of malingering and crack cocaine/ alcohol/benzos/opiate use, multiple admission to rehab and inpatient psychiatry, chronically nonadherent with treatment, last admission at [facility] in April 2019, presenting to the [facility] ED reporting SI for 3 days no specific plan- hearing voices to hurt himself. Patient initially reported last use of crack 1 week ago and denied any etoh use, later contradicted his statement.  Upon interview patient presents calm, cooperative, sleeping comfortably after finishing his food tray. Patient is a very inconsistent historian. He initially states that he hasn't been to any hospital or rehab since he left [facility], then rectifies his statements and reports recent admission to rehab (does not want to state the name; left the program because he didn't like it) . Patient reports that after he was discharged from [facility], he stopped taking his treatment and restarted using crack cocaine and alcohol. He denies any mood or psychotic symptoms from April until 3 days ago. He cannot identify any precipitating factors for his reoccurrence of symptoms. He reports CAH to hurt himself, however he denies an plan and presents future oriented. He insists on being admitted to the hospital stating that if he's admitted he promises to take his treatment and follow up outpatient (he made an identical statement during his prior admission). Although he initially reported no drug or etoh use since 1 week ago, he now admits use of crack and alcohol 1-2 days ago.    The writer reviewed the chart, including patient's last admission and reached out to Nurse Manager Laura Solano. Chart review and NM confirm that patient is chronically nonadherent with treatment and would not benefit from another admission to [facility].

## 2019-07-21 DIAGNOSIS — F31.9 BIPOLAR DISORDER, UNSPECIFIED: ICD-10-CM

## 2019-07-21 DIAGNOSIS — R45.851 SUICIDAL IDEATIONS: ICD-10-CM

## 2019-07-21 DIAGNOSIS — F19.10 OTHER PSYCHOACTIVE SUBSTANCE ABUSE, UNCOMPLICATED: ICD-10-CM

## 2019-07-21 DIAGNOSIS — F20.9 SCHIZOPHRENIA, UNSPECIFIED: ICD-10-CM

## 2019-09-20 NOTE — PROGRESS NOTE BEHAVIORAL HEALTH - PROBLEM SELECTOR PLAN 2
[email or web address] Utox positive for cocaine, thc, opiates on admission.  Encourage 12 step  I/G/T therapy

## 2019-09-25 NOTE — PROGRESS NOTE BEHAVIORAL HEALTH - NSBHADMITMEDEDUDETAILS_A_CORE FT
----- Message from Femi Downs MD sent at 9/25/2019  3:50 PM CDT -----  Call patient.  Good news.  Ultrasound of axilla doesn't show anything abnormal.  
encourage compliance ; review indications.

## 2019-10-30 NOTE — PROGRESS NOTE BEHAVIORAL HEALTH - RECENT MEMORY
Normal
none required

## 2021-01-19 NOTE — ED PROVIDER NOTE - DISCUSSED CLINICAL AND RADIOLOGICAL FINDINGS WITH, MDM
PROCEDURE:   US Abdomen Limited . 

 

CLINICAL INDICATION:   Abdominal pain  

 

TECHNIQUE:   Multiple real-time images were acquired of the patient's right upper quadrant abdomen u
tilizing a high resolution transducer. 

 

COMPARISON:   None 

 

FINDINGS:

The liver measures 13.9 cm and demonstrates a normal echogenicity. The gallbladder is filled with a 
 moderate amount of bile. Small echogenic, shadowing stones , mixed with sludge identified in the mi
d gallbladder.  The gallbladder wall is not thickened at 1.3 mm. No pericholecystic fluid is noted. 
The common bile duct measures 7.3 mm in diameter.  The visualized portions of the proximal pancreas 
are unremarkable. The tail of the pancreas is not well visualized.

 

Antegrade flow is seen in the portal vein.

 

Right kidney measures 9.3 cm.  Right kidney demonstrates a normal echogenicity.  No hydronephrosis, 
masses or stones are noted.

 

IMPRESSION:

Cholelithiasis.

 

Mildly dilated common bile duct.  Etiology is uncertain.  Distal common bile duct obstruction is not
 excluded.  If further characterization is needed MRCP or ERCP could be helpful.

 

Tail of the pancreas not well visualized. If characterization of this structure is needed repeat exa
m or CT/MRI is recommended. 

 

 

RPTAT: AA

_____________________________________________ 

.Jake Pacheco MD, MD           Date    Time 

Electronically viewed and signed by .Jake Pacheco MD, MD on 08/31/2017 18:08 

 

D:  08/31/2017 18:08  T:  08/31/2017 18:08

.P/ [0] : 2) Feeling down, depressed, or hopeless: Not at all (0) [[id number]] : 0 family/patient

## 2021-02-03 NOTE — PROGRESS NOTE BEHAVIORAL HEALTH - PROBLEM/PLAN-1
DISPLAY PLAN FREE TEXT
stated

## 2022-07-07 NOTE — BEHAVIORAL HEALTH ASSESSMENT NOTE - PROBLEM SELECTOR PLAN 2
c/o right ear pain. Endorse feeling un well x 3 days but ear pain developed today.  no PMH
counselling; antidepressants;see interval and summary data for updates

## 2022-07-11 NOTE — PROGRESS NOTE BEHAVIORAL HEALTH - NSBHFUPINTERVALHXFT_PSY_A_CORE
Abdominal Pain, Adult      Pain in the abdomen (abdominal pain) can be caused by many things. Often, abdominal pain is not serious and it gets better with no treatment or by being treated at home. However, sometimes abdominal pain is serious.    Your health care provider will ask questions about your medical history and do a physical exam to try to determine the cause of your abdominal pain.      Follow these instructions at home:    Medicines     •Take over-the-counter and prescription medicines only as told by your health care provider.      • Do not take a laxative unless told by your health care provider.        General instructions      •Watch your condition for any changes.      •Drink enough fluid to keep your urine pale yellow.      •Keep all follow-up visits as told by your health care provider. This is important.        Contact a health care provider if:    •Your abdominal pain changes or gets worse.      •You are not hungry or you lose weight without trying.      •You are constipated or have diarrhea for more than 2–3 days.      •You have pain when you urinate or have a bowel movement.      •Your abdominal pain wakes you up at night.      •Your pain gets worse with meals, after eating, or with certain foods.      •You are vomiting and cannot keep anything down.      •You have a fever.      •You have blood in your urine.        Get help right away if:    •Your pain does not go away as soon as your health care provider told you to expect.      •You cannot stop vomiting.      •Your pain is only in areas of the abdomen, such as the right side or the left lower portion of the abdomen. Pain on the right side could be caused by appendicitis.      •You have bloody or black stools, or stools that look like tar.      •You have severe pain, cramping, or bloating in your abdomen.    •You have signs of dehydration, such as:  •Dark urine, very little urine, or no urine.      •Cracked lips.      •Dry mouth.      •Sunken eyes.      •Sleepiness.      •Weakness.        •You have trouble breathing or chest pain.        Summary    •Often, abdominal pain is not serious and it gets better with no treatment or by being treated at home. However, sometimes abdominal pain is serious.      •Watch your condition for any changes.      •Take over-the-counter and prescription medicines only as told by your health care provider.      •Contact a health care provider if your abdominal pain changes or gets worse.      •Get help right away if you have severe pain, cramping, or bloating in your abdomen.      This information is not intended to replace advice given to you by your health care provider. Make sure you discuss any questions you have with your health care provider. acknowledges some SI and AH but otherwise very quiet and guarded looking at the writer and smiling on occasion; however more visible; little speech; fair ot poor adls;  ACT team to visit today

## 2022-10-13 NOTE — ED PROVIDER NOTE - EYES, MLM
Patient seen and examined on admission.  Full note to follow.  Discussed with the patient's nurse.  CIWA protocol with escalating lorazepam.  PPI drip.  Appreciate GI consult  Clear bilaterally, pupils equal, round and reactive to light.

## 2023-03-21 NOTE — PROGRESS NOTE BEHAVIORAL HEALTH - NS ED BHA MED ROS HEMATOLOGIC LYMPHATIC
90 days sent orders in to repeat tsh before In see her in [facility]
IBT[employer] message sent
Pt is taking levothyroxine 125mcg  [facility] 
No complaints

## 2023-04-17 NOTE — PROGRESS NOTE BEHAVIORAL HEALTH - NS ED BHA MSE GENERAL APPEARANCE
Deformities present Where Do You Want The Question To Include Opioid Counseling Located?: Case Summary Tab

## 2023-08-29 NOTE — ED BEHAVIORAL HEALTH ASSESSMENT NOTE - RELATEDNESS
Detail Level: Zone
Add 27494 Cpt? (Important Note: In 2017 The Use Of 32392 Is Being Tracked By Cms To Determine Future Global Period Reimbursement For Global Periods): yes
Fair

## 2023-09-11 NOTE — PROGRESS NOTE BEHAVIORAL HEALTH - SUMMARY
with patient
50 y/o single, unemployed,  male domiciled at [facility] with a PPH of multiple psychiatric hospitalizations and schizoaffective disorder BIB self for depressed mood and SI stating “I don’t feel like living.” Patient with command auditory hallucinations and questionable visual hallucinations. He appears depressed and endorses suicidal ideation with a plan however does not intend to execute during inpatient stay. Probable schizoaffective d/o vs. MDD in conjunction with AH and questionable VH. Experiencing less auditory hallucinations from baseline today and they are telling him different things today. Generally, he is pleasant, calm, cooperative, with fair eye contact on encounter. Patient was titrated on Paliperidone and received a long-acting injectable, Sustenna 234mg IM yesterday. He will receive the follow-up injection of 156mg IM on 7/3/17. He requires inpatient hospitalization for monitoring through his next injection and for stabilization of symptoms and safe discharge planning.  Given stability today (7/5) can be d/c'd 7/6.
52 y/o single, unemployed,  male domiciled at [facility] with a PPH of multiple psychiatric hospitalizations and schizoaffective disorder BIB self for depressed mood and SI stating “I don’t feel like living.” Patient with command auditory hallucinations and questionable visual hallucinations. He appears depressed and endorses suicidal ideation with a plan however does not intend to execute during inpatient stay. Probable schizoaffective d/o vs. MDD in conjunction with AH and questionable VH. Experiencing less auditory hallucinations from baseline today and they are telling him different things today. Generally, he is pleasant, calm, cooperative, with fair eye contact on encounter. Patient was titrated on Paliperidone and received a long-acting injectable, Sustenna 234mg IM yesterday. He will receive the follow-up injection of 156mg IM on 7/4/17. He requires inpatient hospitalization for monitoring through his next injection and for stabilization of symptoms and safe discharge planning.
50 y/o single, unemployed,  male domiciled at [facility] with a PPH of multiple psychiatric hospitalizations and schizoaffective disorder BIB self for depressed mood and SI stating “I don’t feel like living.” Patient with command auditory hallucinations and questionable visual hallucinations. He appears depressed and endorses suicidal ideation with a plan however does not intend to execute during inpatient stay. Probable schizoaffective d/o vs. MDD in conjunction with AH and questionable VH. Patient is difficult to engage and his affect is blunted. Needs encouragement to take medications. Remains isolative and guarded requiring inpatient hospitalization at this time for the safety of the patient, stabilization of symptoms and for safe discharge planning.
52 y/o single, unemployed,  male domiciled at [facility] with a PPH of multiple psychiatric hospitalizations and schizoaffective disorder BIB self for depressed mood and SI stating “I don’t feel like living.” Patient with command auditory hallucinations and questionable visual hallucinations. He appears depressed and endorses suicidal ideation with a plan however does not intend to execute during inpatient stay. Probable schizoaffective d/o vs. MDD in conjunction with AH and questionable VH. Experiencing less auditory hallucinations from baseline today and they are telling him different things today. Generally, he is pleasant, calm, cooperative, with fair eye contact on encounter. Patient was titrated on Paliperidone and received a long-acting injectable, Sustenna 234mg IM yesterday. He will receive the follow-up injection of 156mg IM on 7/4/17. He requires inpatient hospitalization for monitoring through his next injection and for stabilization of symptoms and safe discharge planning.
52 y/o single, unemployed,  male domiciled at [facility] with a PPH of multiple psychiatric hospitalizations and schizoaffective disorder BIB self for depressed mood and SI stating “I don’t feel like living.” Patient with command auditory hallucinations and questionable visual hallucinations. He appears depressed and endorses suicidal ideation with a plan however does not intend to execute during inpatient stay. Probable schizoaffective d/o vs. MDD in conjunction with AH and questionable VH. Experiencing less auditory hallucinations from baseline today and they are telling him different things today. Generally, he is pleasant, calm, cooperative, with fair eye contact on encounter. Patient was titrated on Paliperidone and received a long-acting injectable, Sustenna 234mg IM yesterday. He will receive the follow-up injection of 156mg IM on 7/4/17. He requires inpatient hospitalization for monitoring through his next injection and for stabilization of symptoms and safe discharge planning.
52 y/o single, unemployed,  male domiciled at [facility] with a PPH of multiple psychiatric hospitalizations and schizoaffective disorder BIB self for depressed mood and SI stating “I don’t feel like living.” Patient with command auditory hallucinations and questionable visual hallucinations. He appears depressed and endorses suicidal ideation with a plan however does not intend to execute during inpatient stay. Probable schizoaffective d/o vs. MDD in conjunction with AH and questionable VH. Experiencing less auditory hallucinations from baseline today and they are telling him different things today. Generally, he is pleasant, calm, cooperative, with fair eye contact on encounter. He will continue to be titrated on Paliperidone with discontinuation of Olanzapine, and intention to administer long-acting injectable prior to discharge. Requires inpatient hospitalization at this time for the safety of the patient, stabilization of symptoms and for safe discharge planning.
52 y/o single, unemployed,  male domiciled at [facility] with a PPH of multiple psychiatric hospitalizations and schizoaffective disorder BIB self for depressed mood and SI stating “I don’t feel like living.” Patient with command auditory hallucinations and questionable visual hallucinations. He appears depressed and endorses suicidal ideation with a plan however does not intend to execute during inpatient stay. Probable schizoaffective d/o vs. MDD in conjunction with AH and questionable VH. Experiencing less auditory hallucinations from baseline today and they are telling him different things today. Generally, he is pleasant, calm, cooperative, with fair eye contact on encounter. Patient was titrated on Paliperidone and received a long-acting injectable today. He requires inpatient hospitalization for monitoring over next 24-48 hours and for stabilization of symptoms and safe discharge planning.
50 y/o single, unemployed,  male domiciled at [facility] with a PPH of multiple psychiatric hospitalizations and schizoaffective disorder BIB self for depressed mood and SI stating “I don’t feel like living.” Patient with command auditory hallucinations and questionable visual hallucinations. He appears depressed and endorses suicidal ideation with a plan however does not intend to execute during inpatient stay. Probable schizoaffective d/o vs. MDD in conjunction with AH and questionable VH. Patient was compliant with laboratory draw this morning as well as obtaining vital signs however initially refused his medications earlier in the day. Remains isolative and guarded requiring inpatient hospitalization at this time for the safety of the patient, stabilization of symptoms and for safe discharge planning.
50 y/o single, unemployed,  male domiciled at [facility] with a PPH of multiple psychiatric hospitalizations and schizoaffective disorder BIB self for depressed mood and SI stating “I don’t feel like living.” Patient with command auditory hallucinations and questionable visual hallucinations. He appears depressed and endorses suicidal ideation with a plan however does not intend to execute during inpatient stay. Probable schizoaffective d/o vs. MDD in conjunction with AH and questionable VH. Experiencing less auditory hallucinations from baseline today and they are telling him different things today. Generally, he is pleasant, calm, cooperative, with fair eye contact on encounter. Requires inpatient hospitalization at this time for the safety of the patient, stabilization of symptoms and for safe discharge planning.
50 y/o single, unemployed,  male domiciled at [facility] with a PPH of multiple psychiatric hospitalizations and schizoaffective disorder BIB self for depressed mood and SI stating “I don’t feel like living.” Patient with command auditory hallucinations and questionable visual hallucinations. He appears depressed and endorses suicidal ideation with a plan however does not intend to execute during inpatient stay. Probable schizoaffective d/o vs. MDD in conjunction with AH and questionable VH. Patient is difficult to engage and his affect is blunted. Needs encouragement to take medications. Remains isolative and guarded requiring inpatient hospitalization at this time for the safety of the patient, stabilization of symptoms and for safe discharge planning.
52 y/o single, unemployed,  male domiciled at [facility] with a PPH of multiple psychiatric hospitalizations and schizoaffective disorder BIB self for depressed mood and SI stating “I don’t feel like living.” Patient with command auditory hallucinations and questionable visual hallucinations. He appears depressed and endorses suicidal ideation with a plan however does not intend to execute during inpatient stay. Probable schizoaffective d/o vs. MDD in conjunction with AH and questionable VH. Experiencing less auditory hallucinations from baseline today. Pleasant, calm, cooperative, with fair eye contact on encounter. Requires inpatient hospitalization at this time for the safety of the patient, stabilization of symptoms and for safe discharge planning.
52 y/o single, unemployed,  male domiciled at [facility] with a PPH of multiple psychiatric hospitalizations and schizoaffective disorder BIB self for depressed mood and SI stating “I don’t feel like living.” Patient with command auditory hallucinations and questionable visual hallucinations. He appears depressed and endorses suicidal ideation with a plan however does not intend to execute during inpatient stay. Probable schizoaffective d/o vs. MDD in conjunction with AH and questionable VH. Patient is difficult to engage and his affect is blunted. Needs encouragement to take medications. Remains isolative and guarded requiring inpatient hospitalization at this time for the safety of the patient, stabilization of symptoms and for safe discharge planning.
50 y/o single, unemployed,  male domiciled at [facility] with a PPH of multiple psychiatric hospitalizations and schizoaffective disorder BIB self for depressed mood and SI stating “I don’t feel like living.” Patient with command auditory hallucinations and questionable visual hallucinations. He appears depressed and endorses suicidal ideation with a plan however does not intend to execute during inpatient stay. Probable schizoaffective d/o vs. MDD in conjunction with AH and questionable VH. Experiencing less auditory hallucinations from baseline today and they are telling him different things today. Generally, he is pleasant, calm, cooperative, with fair eye contact on encounter. He will continue to be titrated on Paliperidone with intention to administer long-acting injectable prior to discharge. Requires inpatient hospitalization at this time for the safety of the patient, stabilization of symptoms and for safe discharge planning.
50 y/o single, unemployed,  male domiciled at [facility] with a PPH of multiple psychiatric hospitalizations and schizoaffective disorder BIB self for depressed mood and SI stating “I don’t feel like living.” Patient with command auditory hallucinations and questionable visual hallucinations. He appears depressed and endorses suicidal ideation with a plan however does not intend to execute during inpatient stay. Probable schizoaffective d/o vs. MDD in conjunction with AH and questionable VH. Experiencing less auditory hallucinations from baseline today and they are telling him different things today. Generally, he is pleasant, calm, cooperative, with fair eye contact on encounter. He will continue to be titrated on Paliperidone with intention to administer long-acting injectable prior to discharge. Requires inpatient hospitalization at this time for the safety of the patient, stabilization of symptoms and for safe discharge planning.
52 y/o single, unemployed,  male domiciled at [facility] with a PPH of multiple psychiatric hospitalizations and schizoaffective disorder BIB self for depressed mood and SI stating “I don’t feel like living.” Patient with command auditory hallucinations and questionable visual hallucinations. He appears depressed and endorses suicidal ideation with a plan however does not intend to execute during inpatient stay. Probable schizoaffective d/o vs. MDD in conjunction with AH and questionable VH. Patient with depressive affect today. Patient compliant with all medications but remains depressed and lethargic. Patient started on Paliperidone on 6/13 with intention of titrating Paliperidone and tapering olanzapine. He continues to require inpatient hospitalization at this time for the safety of the patient, stabilization of symptoms and for safe discharge planning.
52 y/o single, unemployed,  male domiciled at [facility] with a PPH of multiple psychiatric hospitalizations and schizoaffective disorder BIB self for depressed mood and SI stating “I don’t feel like living.” Patient with command auditory hallucinations and questionable visual hallucinations. He appears depressed and endorses suicidal ideation with a plan however does not intend to execute during inpatient stay. Probable schizoaffective d/o vs. MDD in conjunction with AH and questionable VH. Experiencing less auditory hallucinations from baseline today and they are telling him different things today. Generally, he is pleasant, calm, cooperative, with fair eye contact on encounter. He will continue to be titrated on Paliperidone with intention to administer long-acting injectable prior to discharge. Requires inpatient hospitalization at this time for the safety of the patient, stabilization of symptoms and for safe discharge planning.
52 y/o single, unemployed,  male domiciled at [facility] with a PPH of multiple psychiatric hospitalizations and schizoaffective disorder BIB self for depressed mood and SI stating “I don’t feel like living.” Patient with command auditory hallucinations and questionable visual hallucinations. He appears depressed and endorses suicidal ideation with a plan however does not intend to execute during inpatient stay. Probable schizoaffective d/o vs. MDD in conjunction with AH and questionable VH. Experiencing less auditory hallucinations from baseline today and they are telling him different things today. Generally, he is pleasant, calm, cooperative, with fair eye contact on encounter. He will continue to be titrated on Paliperidone and decreased on Olanzapine, with intention to administer long-acting injectable prior to discharge. Requires inpatient hospitalization at this time for the safety of the patient, stabilization of symptoms and for safe discharge planning.
50 y/o single, unemployed,  male domiciled at [facility] with a PPH of multiple psychiatric hospitalizations and schizoaffective disorder BIB self for depressed mood and SI stating “I don’t feel like living.” Patient with command auditory hallucinations and questionable visual hallucinations. He appears depressed and endorses suicidal ideation with a plan. Probable schizoaffective d/o vs. MDD in conjunction with AH and questionable VH. Since admission to inpatient unit, patient has been non-compliant with laboratory draws, vital signs and non-participatory in group activities. He is compliant with medications but remains isolative and requires inpatient hospitalization at this time for the safety of the patient, stabilization of symptoms and for safe discharge planning.

## 2024-01-08 NOTE — PROGRESS NOTE BEHAVIORAL HEALTH - NSBHFUPVIOL_PSY_A_CORE
none known
Negative
none known

## 2024-02-01 NOTE — PROGRESS NOTE BEHAVIORAL HEALTH - NSBHADMITIPBHPROVIDER_PSY_A_CORE
Ordered zafar 3 with P A request  
yes

## 2024-07-25 NOTE — PROGRESS NOTE BEHAVIORAL HEALTH - NS ED BHA MED ROS EYES
No complaints
noticed blood in drainage bags, has Rodríguez and IR drain, h/o prostate CA

## 2024-11-06 NOTE — PROGRESS NOTE BEHAVIORAL HEALTH - THOUGHT CONTENT
Unremarkable
Hopelessness/Suicidality
Unremarkable
Hopelessness/Suicidality
Suicidality/Hopelessness
No.
Suicidality/Hopelessness

## 2024-11-09 NOTE — ED BEHAVIORAL HEALTH ASSESSMENT NOTE - NS ED BHA MSE SPEECH VOLUME
Case discussed, pt with sore throat, fever, body aches, vomiting. On exam has erythematous uvula, exudate noted (has had tonsillectomy). All testing here negative. Given involvement of uvula, agree with presumptive abx (augmentin) two times a day for seven days.    Radha Quiroz MD    Soft

## 2025-02-04 NOTE — ED ADULT TRIAGE NOTE - NS ED TRIAGE AVPU SCALE
Alert-The patient is alert, awake and responds to voice. The patient is oriented to time, place, and person. The triage nurse is able to obtain subjective information. To get better and follow your care plan as instructed.

## 2025-04-22 NOTE — ED BEHAVIORAL HEALTH ASSESSMENT NOTE - RELATEDNESS
LOV 3/17/2025  Future Appointments   Date Time Provider Department Center   7/17/2025  4:20 PM Virginia Martinez APRN - CNP [facility]       
[facility]